# Patient Record
Sex: MALE | Race: WHITE | Employment: OTHER | URBAN - METROPOLITAN AREA
[De-identification: names, ages, dates, MRNs, and addresses within clinical notes are randomized per-mention and may not be internally consistent; named-entity substitution may affect disease eponyms.]

---

## 2017-01-01 ENCOUNTER — HOSPITAL ENCOUNTER (OUTPATIENT)
Dept: CARDIOLOGY | Age: 82
Discharge: HOME | End: 2017-12-19
Payer: MEDICARE

## 2017-01-01 ENCOUNTER — HOSPITAL ENCOUNTER (EMERGENCY)
Age: 82
Discharge: HOME | End: 2017-12-06
Payer: MEDICARE

## 2017-01-01 ENCOUNTER — APPOINTMENT (OUTPATIENT)
Dept: CT IMAGING | Age: 82
End: 2017-01-01
Payer: MEDICARE

## 2017-01-01 VITALS
TEMPERATURE: 97.8 F | SYSTOLIC BLOOD PRESSURE: 103 MMHG | DIASTOLIC BLOOD PRESSURE: 89 MMHG | HEART RATE: 89 BPM | WEIGHT: 230 LBS | BODY MASS INDEX: 31.15 KG/M2 | HEIGHT: 72 IN | RESPIRATION RATE: 18 BRPM | OXYGEN SATURATION: 99 %

## 2017-01-01 DIAGNOSIS — J01.91 ACUTE RECURRENT SINUSITIS, UNSPECIFIED LOCATION: Primary | ICD-10-CM

## 2017-01-01 PROCEDURE — 99283 EMERGENCY DEPT VISIT LOW MDM: CPT

## 2017-01-01 PROCEDURE — 93290 INTERROG DEV EVAL ICPMS IP: CPT

## 2017-01-01 PROCEDURE — 6370000000 HC RX 637 (ALT 250 FOR IP): Performed by: NURSE PRACTITIONER

## 2017-01-01 PROCEDURE — 70486 CT MAXILLOFACIAL W/O DYE: CPT

## 2017-01-01 PROCEDURE — 93283 PRGRMG EVAL IMPLANTABLE DFB: CPT

## 2017-01-01 RX ORDER — AMOXICILLIN AND CLAVULANATE POTASSIUM 875; 125 MG/1; MG/1
1 TABLET, FILM COATED ORAL 2 TIMES DAILY
Qty: 20 TABLET | Refills: 0 | Status: SHIPPED | OUTPATIENT
Start: 2017-01-01 | End: 2017-01-01

## 2017-01-01 RX ORDER — LORATADINE 10 MG/1
10 TABLET ORAL DAILY
Qty: 10 TABLET | Refills: 0 | Status: ON HOLD | OUTPATIENT
Start: 2017-01-01 | End: 2018-01-01 | Stop reason: ALTCHOICE

## 2017-01-01 RX ORDER — AMOXICILLIN AND CLAVULANATE POTASSIUM 875; 125 MG/1; MG/1
1 TABLET, FILM COATED ORAL ONCE
Status: COMPLETED | OUTPATIENT
Start: 2017-01-01 | End: 2017-01-01

## 2017-01-01 RX ORDER — BENZONATATE 100 MG/1
100 CAPSULE ORAL 3 TIMES DAILY PRN
Qty: 20 CAPSULE | Refills: 0 | Status: SHIPPED | OUTPATIENT
Start: 2017-01-01 | End: 2017-01-01

## 2017-01-01 RX ADMIN — AMOXICILLIN AND CLAVULANATE POTASSIUM 1 TABLET: 875; 125 TABLET, FILM COATED ORAL at 09:00

## 2017-01-01 ASSESSMENT — ENCOUNTER SYMPTOMS
ABDOMINAL PAIN: 0
VOMITING: 0
COUGH: 0
DIARRHEA: 0
VOICE CHANGE: 0
NAUSEA: 0
RHINORRHEA: 1
SHORTNESS OF BREATH: 0
SINUS PRESSURE: 1
TROUBLE SWALLOWING: 0

## 2017-01-16 ENCOUNTER — HOSPITAL ENCOUNTER (OUTPATIENT)
Dept: GENERAL RADIOLOGY | Age: 82
Discharge: HOME | End: 2017-01-16
Payer: MEDICARE

## 2017-01-16 DIAGNOSIS — R04.2 BLOOD IN SPUTUM: ICD-10-CM

## 2017-01-16 PROCEDURE — 71020 XR CHEST STANDARD TWO VW: CPT

## 2017-01-21 ENCOUNTER — HOSPITAL ENCOUNTER (EMERGENCY)
Age: 82
Discharge: HOME | End: 2017-01-21
Attending: STUDENT IN AN ORGANIZED HEALTH CARE EDUCATION/TRAINING PROGRAM
Payer: MEDICARE

## 2017-01-21 ENCOUNTER — APPOINTMENT (OUTPATIENT)
Dept: CT IMAGING | Age: 82
End: 2017-01-21
Payer: MEDICARE

## 2017-01-21 ENCOUNTER — APPOINTMENT (OUTPATIENT)
Dept: GENERAL RADIOLOGY | Age: 82
End: 2017-01-21
Payer: MEDICARE

## 2017-01-21 VITALS
HEART RATE: 79 BPM | HEIGHT: 73 IN | RESPIRATION RATE: 18 BRPM | OXYGEN SATURATION: 100 % | TEMPERATURE: 97.5 F | BODY MASS INDEX: 31.14 KG/M2 | DIASTOLIC BLOOD PRESSURE: 83 MMHG | WEIGHT: 235 LBS | SYSTOLIC BLOOD PRESSURE: 101 MMHG

## 2017-01-21 DIAGNOSIS — R79.1 SUPRATHERAPEUTIC INR: ICD-10-CM

## 2017-01-21 DIAGNOSIS — S16.1XXA STRAIN OF NECK, INITIAL ENCOUNTER: Primary | ICD-10-CM

## 2017-01-21 LAB
APTT: 41.6 SEC (ref 21.6–35.4)
BASOPHILS ABSOLUTE: 0 K/UL (ref 0–0.2)
BASOPHILS RELATIVE PERCENT: 0.7 %
CK MB: 3.5 NG/ML (ref 0–6.7)
CREATINE KINASE-MB INDEX: 3.1 % (ref 0–3.5)
EOSINOPHILS ABSOLUTE: 0 K/UL (ref 0–0.7)
EOSINOPHILS RELATIVE PERCENT: 0.9 %
GFR AFRICAN AMERICAN: >60
GFR NON-AFRICAN AMERICAN: 53
HCT VFR BLD CALC: 43 % (ref 42–52)
HEMOGLOBIN: 14 G/DL (ref 14–18)
INR BLD: 4.5
LYMPHOCYTES ABSOLUTE: 0.9 K/UL (ref 1–4.8)
LYMPHOCYTES RELATIVE PERCENT: 16.8 %
MCH RBC QN AUTO: 30.6 PG (ref 27–31.3)
MCHC RBC AUTO-ENTMCNC: 32.7 % (ref 33–37)
MCV RBC AUTO: 93.5 FL (ref 80–100)
MONOCYTES ABSOLUTE: 0.4 K/UL (ref 0.2–0.8)
MONOCYTES RELATIVE PERCENT: 8.4 %
NEUTROPHILS ABSOLUTE: 3.8 K/UL (ref 1.4–6.5)
NEUTROPHILS RELATIVE PERCENT: 73.2 %
PDW BLD-RTO: 14.3 % (ref 11.5–14.5)
PERFORMED ON: ABNORMAL
PLATELET # BLD: 113 K/UL (ref 130–400)
POC CREATININE WHOLE BLOOD: 1.3
POC CREATININE: 1.3 MG/DL (ref 0.8–1.3)
POC SAMPLE TYPE: ABNORMAL
PROTHROMBIN TIME: 50.6 SEC (ref 8.1–13.7)
RBC # BLD: 4.6 M/UL (ref 4.7–6.1)
TOTAL CK: 112 U/L (ref 0–190)
TROPONIN: <0.01 NG/ML (ref 0–0.01)
WBC # BLD: 5.2 K/UL (ref 4.8–10.8)

## 2017-01-21 PROCEDURE — 99284 EMERGENCY DEPT VISIT MOD MDM: CPT

## 2017-01-21 PROCEDURE — 36415 COLL VENOUS BLD VENIPUNCTURE: CPT

## 2017-01-21 PROCEDURE — 6360000004 HC RX CONTRAST MEDICATION: Performed by: RADIOLOGY

## 2017-01-21 PROCEDURE — 85730 THROMBOPLASTIN TIME PARTIAL: CPT

## 2017-01-21 PROCEDURE — 85025 COMPLETE CBC W/AUTO DIFF WBC: CPT

## 2017-01-21 PROCEDURE — 93005 ELECTROCARDIOGRAM TRACING: CPT

## 2017-01-21 PROCEDURE — 82550 ASSAY OF CK (CPK): CPT

## 2017-01-21 PROCEDURE — 71260 CT THORAX DX C+: CPT

## 2017-01-21 PROCEDURE — 82553 CREATINE MB FRACTION: CPT

## 2017-01-21 PROCEDURE — 85610 PROTHROMBIN TIME: CPT

## 2017-01-21 PROCEDURE — 84484 ASSAY OF TROPONIN QUANT: CPT

## 2017-01-21 PROCEDURE — 71020 XR CHEST STANDARD TWO VW: CPT

## 2017-01-21 PROCEDURE — 70491 CT SOFT TISSUE NECK W/DYE: CPT

## 2017-01-21 RX ORDER — HYDROCODONE BITARTRATE AND ACETAMINOPHEN 5; 325 MG/1; MG/1
1 TABLET ORAL EVERY 6 HOURS PRN
Qty: 16 TABLET | Refills: 0 | Status: SHIPPED | OUTPATIENT
Start: 2017-01-21 | End: 2017-11-06 | Stop reason: SDUPTHER

## 2017-01-21 RX ADMIN — IOPAMIDOL 100 ML: 612 INJECTION, SOLUTION INTRAVENOUS at 10:28

## 2017-01-21 ASSESSMENT — ENCOUNTER SYMPTOMS
DIARRHEA: 0
SHORTNESS OF BREATH: 0
VOMITING: 0
SINUS PRESSURE: 0
CHEST TIGHTNESS: 0
TROUBLE SWALLOWING: 0
ABDOMINAL PAIN: 0
BACK PAIN: 0
COUGH: 0

## 2017-01-21 ASSESSMENT — PAIN SCALES - GENERAL
PAINLEVEL_OUTOF10: 9
PAINLEVEL_OUTOF10: 9
PAINLEVEL_OUTOF10: 0

## 2017-01-21 ASSESSMENT — PAIN DESCRIPTION - LOCATION
LOCATION: NECK
LOCATION: NECK

## 2017-01-21 ASSESSMENT — PAIN DESCRIPTION - PAIN TYPE: TYPE: ACUTE PAIN

## 2017-01-21 ASSESSMENT — PAIN DESCRIPTION - ORIENTATION: ORIENTATION: LEFT

## 2017-01-31 ENCOUNTER — HOSPITAL ENCOUNTER (EMERGENCY)
Age: 82
Discharge: HOME | End: 2017-01-31
Attending: EMERGENCY MEDICINE
Payer: MEDICARE

## 2017-01-31 ENCOUNTER — APPOINTMENT (OUTPATIENT)
Dept: GENERAL RADIOLOGY | Age: 82
End: 2017-01-31
Payer: MEDICARE

## 2017-01-31 VITALS
HEART RATE: 75 BPM | RESPIRATION RATE: 16 BRPM | DIASTOLIC BLOOD PRESSURE: 68 MMHG | OXYGEN SATURATION: 97 % | TEMPERATURE: 98 F | WEIGHT: 235 LBS | BODY MASS INDEX: 31 KG/M2 | SYSTOLIC BLOOD PRESSURE: 101 MMHG

## 2017-01-31 DIAGNOSIS — R06.02 SHORTNESS OF BREATH: Primary | ICD-10-CM

## 2017-01-31 LAB
ALBUMIN SERPL-MCNC: 4.3 G/DL (ref 3.9–4.9)
ALP BLD-CCNC: 63 U/L (ref 35–104)
ALT SERPL-CCNC: <5 U/L (ref 0–41)
ANION GAP SERPL CALCULATED.3IONS-SCNC: 11 MEQ/L (ref 7–13)
AST SERPL-CCNC: 22 U/L (ref 0–40)
BASOPHILS ABSOLUTE: 0 K/UL (ref 0–0.2)
BASOPHILS RELATIVE PERCENT: 0.6 %
BILIRUB SERPL-MCNC: 1.2 MG/DL (ref 0–1.2)
BUN BLDV-MCNC: 24 MG/DL (ref 8–23)
CALCIUM SERPL-MCNC: 8.8 MG/DL (ref 8.6–10.2)
CHLORIDE BLD-SCNC: 99 MEQ/L (ref 98–107)
CO2: 29 MEQ/L (ref 22–29)
CREAT SERPL-MCNC: 1.13 MG/DL (ref 0.7–1.2)
EKG ATRIAL RATE: 73 BPM
EKG ATRIAL RATE: 75 BPM
EKG Q-T INTERVAL: 484 MS
EKG Q-T INTERVAL: 494 MS
EKG QRS DURATION: 188 MS
EKG QRS DURATION: 192 MS
EKG QTC CALCULATION (BAZETT): 547 MS
EKG QTC CALCULATION (BAZETT): 566 MS
EKG R AXIS: -84 DEGREES
EKG R AXIS: -88 DEGREES
EKG T AXIS: 90 DEGREES
EKG T AXIS: 92 DEGREES
EKG VENTRICULAR RATE: 77 BPM
EKG VENTRICULAR RATE: 79 BPM
EOSINOPHILS ABSOLUTE: 0 K/UL (ref 0–0.7)
EOSINOPHILS RELATIVE PERCENT: 0.9 %
GFR AFRICAN AMERICAN: >60
GFR NON-AFRICAN AMERICAN: >60
GLOBULIN: 2.3 G/DL (ref 2.3–3.5)
GLUCOSE BLD-MCNC: 108 MG/DL (ref 74–109)
HCT VFR BLD CALC: 44.1 % (ref 42–52)
HEMOGLOBIN: 14.1 G/DL (ref 14–18)
LYMPHOCYTES ABSOLUTE: 1 K/UL (ref 1–4.8)
LYMPHOCYTES RELATIVE PERCENT: 19.2 %
MCH RBC QN AUTO: 29.9 PG (ref 27–31.3)
MCHC RBC AUTO-ENTMCNC: 32 % (ref 33–37)
MCV RBC AUTO: 93.6 FL (ref 80–100)
MONOCYTES ABSOLUTE: 0.5 K/UL (ref 0.2–0.8)
MONOCYTES RELATIVE PERCENT: 10.1 %
NEUTROPHILS ABSOLUTE: 3.6 K/UL (ref 1.4–6.5)
NEUTROPHILS RELATIVE PERCENT: 69.2 %
PDW BLD-RTO: 14.1 % (ref 11.5–14.5)
PLATELET # BLD: 111 K/UL (ref 130–400)
POTASSIUM SERPL-SCNC: 4.2 MEQ/L (ref 3.5–5.1)
RBC # BLD: 4.72 M/UL (ref 4.7–6.1)
SODIUM BLD-SCNC: 139 MEQ/L (ref 132–144)
TOTAL PROTEIN: 6.6 G/DL (ref 6.4–8.1)
TROPONIN: <0.01 NG/ML (ref 0–0.01)
WBC # BLD: 5.2 K/UL (ref 4.8–10.8)

## 2017-01-31 PROCEDURE — 36415 COLL VENOUS BLD VENIPUNCTURE: CPT

## 2017-01-31 PROCEDURE — 99285 EMERGENCY DEPT VISIT HI MDM: CPT

## 2017-01-31 PROCEDURE — 71010 XR CHEST PORTABLE: CPT

## 2017-01-31 PROCEDURE — 84484 ASSAY OF TROPONIN QUANT: CPT

## 2017-01-31 PROCEDURE — 80053 COMPREHEN METABOLIC PANEL: CPT

## 2017-01-31 PROCEDURE — 96374 THER/PROPH/DIAG INJ IV PUSH: CPT

## 2017-01-31 PROCEDURE — 6360000002 HC RX W HCPCS: Performed by: EMERGENCY MEDICINE

## 2017-01-31 PROCEDURE — 93005 ELECTROCARDIOGRAM TRACING: CPT

## 2017-01-31 PROCEDURE — 85025 COMPLETE CBC W/AUTO DIFF WBC: CPT

## 2017-01-31 RX ORDER — LORAZEPAM 2 MG/ML
1 INJECTION INTRAMUSCULAR ONCE
Status: COMPLETED | OUTPATIENT
Start: 2017-01-31 | End: 2017-01-31

## 2017-01-31 RX ADMIN — LORAZEPAM 1 MG: 2 INJECTION INTRAMUSCULAR; INTRAVENOUS at 05:52

## 2017-01-31 ASSESSMENT — ENCOUNTER SYMPTOMS
CHEST TIGHTNESS: 0
SORE THROAT: 0
ABDOMINAL PAIN: 0
PHOTOPHOBIA: 0
SHORTNESS OF BREATH: 0
ABDOMINAL DISTENTION: 0
VOMITING: 0
EYE DISCHARGE: 0
WHEEZING: 0
COUGH: 0

## 2017-02-18 ENCOUNTER — HOSPITAL ENCOUNTER (EMERGENCY)
Age: 82
Discharge: HOME | End: 2017-02-18
Attending: FAMILY MEDICINE
Payer: MEDICARE

## 2017-02-18 VITALS
HEART RATE: 80 BPM | TEMPERATURE: 97.8 F | WEIGHT: 235 LBS | BODY MASS INDEX: 31.14 KG/M2 | DIASTOLIC BLOOD PRESSURE: 81 MMHG | HEIGHT: 73 IN | OXYGEN SATURATION: 95 % | SYSTOLIC BLOOD PRESSURE: 119 MMHG | RESPIRATION RATE: 24 BRPM

## 2017-02-18 DIAGNOSIS — J40 BRONCHITIS: Primary | ICD-10-CM

## 2017-02-18 DIAGNOSIS — F41.1 ANXIETY STATE: ICD-10-CM

## 2017-02-18 PROCEDURE — 99283 EMERGENCY DEPT VISIT LOW MDM: CPT

## 2017-02-18 ASSESSMENT — PAIN SCALES - GENERAL
PAINLEVEL_OUTOF10: 0
PAINLEVEL_OUTOF10: 8

## 2017-02-18 ASSESSMENT — PAIN DESCRIPTION - LOCATION: LOCATION: GENERALIZED

## 2017-03-03 ENCOUNTER — HOSPITAL ENCOUNTER (OUTPATIENT)
Dept: PULMONOLOGY | Age: 82
Discharge: HOME | End: 2017-03-03
Payer: MEDICARE

## 2017-03-03 PROCEDURE — 94726 PLETHYSMOGRAPHY LUNG VOLUMES: CPT

## 2017-03-03 PROCEDURE — 94010 BREATHING CAPACITY TEST: CPT

## 2017-03-03 PROCEDURE — 94729 DIFFUSING CAPACITY: CPT

## 2017-03-21 ENCOUNTER — HOSPITAL ENCOUNTER (OUTPATIENT)
Dept: CARDIOLOGY | Age: 82
Discharge: HOME | End: 2017-03-21
Payer: MEDICARE

## 2017-03-21 PROCEDURE — 93290 INTERROG DEV EVAL ICPMS IP: CPT

## 2017-03-21 PROCEDURE — 93283 PRGRMG EVAL IMPLANTABLE DFB: CPT

## 2017-06-20 ENCOUNTER — HOSPITAL ENCOUNTER (OUTPATIENT)
Dept: CARDIOLOGY | Age: 82
Discharge: HOME | End: 2017-06-20
Payer: MEDICARE

## 2017-06-20 PROCEDURE — 93290 INTERROG DEV EVAL ICPMS IP: CPT

## 2017-06-20 PROCEDURE — 93283 PRGRMG EVAL IMPLANTABLE DFB: CPT

## 2017-08-10 ENCOUNTER — APPOINTMENT (OUTPATIENT)
Dept: CT IMAGING | Age: 82
DRG: 813 | End: 2017-08-10
Payer: MEDICARE

## 2017-08-10 ENCOUNTER — HOSPITAL ENCOUNTER (INPATIENT)
Age: 82
LOS: 6 days | Discharge: HOME | DRG: 813 | End: 2017-08-16
Attending: INTERNAL MEDICINE | Admitting: INTERNAL MEDICINE
Payer: MEDICARE

## 2017-08-10 DIAGNOSIS — R04.2 HEMOPTYSIS: ICD-10-CM

## 2017-08-10 DIAGNOSIS — J18.9 PNEUMONIA OF RIGHT LOWER LOBE DUE TO INFECTIOUS ORGANISM: Primary | ICD-10-CM

## 2017-08-10 LAB
ALBUMIN SERPL-MCNC: 4.2 G/DL (ref 3.9–4.9)
ALP BLD-CCNC: 87 U/L (ref 35–104)
ALT SERPL-CCNC: <5 U/L (ref 0–41)
ANION GAP SERPL CALCULATED.3IONS-SCNC: 12 MEQ/L (ref 7–13)
APTT: 32 SEC (ref 21.6–35.4)
AST SERPL-CCNC: 25 U/L (ref 0–40)
BASOPHILS ABSOLUTE: 0 K/UL (ref 0–0.2)
BASOPHILS RELATIVE PERCENT: 0.6 %
BILIRUB SERPL-MCNC: 0.8 MG/DL (ref 0–1.2)
BILIRUBIN DIRECT: 0.2 MG/DL (ref 0–0.3)
BILIRUBIN, INDIRECT: 0.6 MG/DL (ref 0–0.6)
BUN BLDV-MCNC: 35 MG/DL (ref 8–23)
CALCIUM SERPL-MCNC: 8.8 MG/DL (ref 8.6–10.2)
CHLORIDE BLD-SCNC: 99 MEQ/L (ref 98–107)
CO2: 29 MEQ/L (ref 22–29)
CREAT SERPL-MCNC: 1.11 MG/DL (ref 0.7–1.2)
EOSINOPHILS ABSOLUTE: 0 K/UL (ref 0–0.7)
EOSINOPHILS RELATIVE PERCENT: 0.7 %
GFR AFRICAN AMERICAN: >60
GFR NON-AFRICAN AMERICAN: >60
GLOBULIN: 2.9 G/DL (ref 2.3–3.5)
GLUCOSE BLD-MCNC: 111 MG/DL (ref 74–109)
HCT VFR BLD CALC: 44.9 % (ref 42–52)
HEMOGLOBIN: 14.8 G/DL (ref 14–18)
INR BLD: 2.7
LACTIC ACID: 1.5 MMOL/L (ref 0.5–2.2)
LIPASE: 40 U/L (ref 13–60)
LYMPHOCYTES ABSOLUTE: 0.7 K/UL (ref 1–4.8)
LYMPHOCYTES RELATIVE PERCENT: 10.3 %
MCH RBC QN AUTO: 30.1 PG (ref 27–31.3)
MCHC RBC AUTO-ENTMCNC: 32.9 % (ref 33–37)
MCV RBC AUTO: 91.5 FL (ref 80–100)
MONOCYTES ABSOLUTE: 0.4 K/UL (ref 0.2–0.8)
MONOCYTES RELATIVE PERCENT: 5.5 %
NEUTROPHILS ABSOLUTE: 5.7 K/UL (ref 1.4–6.5)
NEUTROPHILS RELATIVE PERCENT: 82.9 %
PDW BLD-RTO: 14.6 % (ref 11.5–14.5)
PLATELET # BLD: 135 K/UL (ref 130–400)
POTASSIUM SERPL-SCNC: 4.3 MEQ/L (ref 3.5–5.1)
PROTHROMBIN TIME: 28.8 SEC (ref 8.1–13.7)
RBC # BLD: 4.9 M/UL (ref 4.7–6.1)
SODIUM BLD-SCNC: 140 MEQ/L (ref 132–144)
TOTAL PROTEIN: 7.1 G/DL (ref 6.4–8.1)
WBC # BLD: 6.9 K/UL (ref 4.8–10.8)

## 2017-08-10 PROCEDURE — 83605 ASSAY OF LACTIC ACID: CPT

## 2017-08-10 PROCEDURE — 85730 THROMBOPLASTIN TIME PARTIAL: CPT

## 2017-08-10 PROCEDURE — 6360000002 HC RX W HCPCS: Performed by: NURSE PRACTITIONER

## 2017-08-10 PROCEDURE — 83690 ASSAY OF LIPASE: CPT

## 2017-08-10 PROCEDURE — 85610 PROTHROMBIN TIME: CPT

## 2017-08-10 PROCEDURE — 85025 COMPLETE CBC W/AUTO DIFF WBC: CPT

## 2017-08-10 PROCEDURE — 71275 CT ANGIOGRAPHY CHEST: CPT

## 2017-08-10 PROCEDURE — 70450 CT HEAD/BRAIN W/O DYE: CPT

## 2017-08-10 PROCEDURE — 36415 COLL VENOUS BLD VENIPUNCTURE: CPT

## 2017-08-10 PROCEDURE — 96365 THER/PROPH/DIAG IV INF INIT: CPT

## 2017-08-10 PROCEDURE — 1210000000 HC MED SURG R&B

## 2017-08-10 PROCEDURE — 2580000003 HC RX 258: Performed by: NURSE PRACTITIONER

## 2017-08-10 PROCEDURE — 82248 BILIRUBIN DIRECT: CPT

## 2017-08-10 PROCEDURE — 6360000004 HC RX CONTRAST MEDICATION: Performed by: RADIOLOGY

## 2017-08-10 PROCEDURE — 80053 COMPREHEN METABOLIC PANEL: CPT

## 2017-08-10 PROCEDURE — 87040 BLOOD CULTURE FOR BACTERIA: CPT

## 2017-08-10 PROCEDURE — 6360000002 HC RX W HCPCS: Performed by: PHYSICIAN ASSISTANT

## 2017-08-10 PROCEDURE — 99284 EMERGENCY DEPT VISIT MOD MDM: CPT

## 2017-08-10 PROCEDURE — 96375 TX/PRO/DX INJ NEW DRUG ADDON: CPT

## 2017-08-10 RX ORDER — LEVOFLOXACIN 5 MG/ML
750 INJECTION, SOLUTION INTRAVENOUS ONCE
Status: COMPLETED | OUTPATIENT
Start: 2017-08-10 | End: 2017-08-11

## 2017-08-10 RX ORDER — SODIUM CHLORIDE 0.9 % (FLUSH) 0.9 %
3 SYRINGE (ML) INJECTION EVERY 8 HOURS
Status: DISCONTINUED | OUTPATIENT
Start: 2017-08-10 | End: 2017-08-11

## 2017-08-10 RX ORDER — LORAZEPAM 2 MG/ML
1 INJECTION INTRAMUSCULAR ONCE
Status: COMPLETED | OUTPATIENT
Start: 2017-08-10 | End: 2017-08-10

## 2017-08-10 RX ADMIN — IOPAMIDOL 100 ML: 755 INJECTION, SOLUTION INTRAVENOUS at 21:33

## 2017-08-10 RX ADMIN — LEVOFLOXACIN 750 MG: 5 INJECTION, SOLUTION INTRAVENOUS at 23:04

## 2017-08-10 RX ADMIN — SODIUM CHLORIDE, PRESERVATIVE FREE 3 ML: 5 INJECTION INTRAVENOUS at 21:05

## 2017-08-10 RX ADMIN — LORAZEPAM 1 MG: 2 INJECTION INTRAMUSCULAR; INTRAVENOUS at 21:05

## 2017-08-10 ASSESSMENT — PAIN DESCRIPTION - LOCATION: LOCATION: ABDOMEN

## 2017-08-10 ASSESSMENT — ENCOUNTER SYMPTOMS
SHORTNESS OF BREATH: 0
RHINORRHEA: 0
COUGH: 1
WHEEZING: 0
SORE THROAT: 0
EYE DISCHARGE: 0

## 2017-08-10 ASSESSMENT — PAIN SCALES - GENERAL: PAINLEVEL_OUTOF10: 6

## 2017-08-10 ASSESSMENT — PAIN DESCRIPTION - PAIN TYPE: TYPE: ACUTE PAIN

## 2017-08-11 PROBLEM — D69.6 THROMBOCYTOPENIA (HCC): Status: ACTIVE | Noted: 2017-08-11

## 2017-08-11 LAB
ALBUMIN SERPL-MCNC: 3.3 G/DL (ref 3.9–4.9)
ALP BLD-CCNC: 53 U/L (ref 35–104)
ALT SERPL-CCNC: 10 U/L (ref 0–41)
ANION GAP SERPL CALCULATED.3IONS-SCNC: 11 MEQ/L (ref 7–13)
AST SERPL-CCNC: 19 U/L (ref 0–40)
BILIRUB SERPL-MCNC: 1.3 MG/DL (ref 0–1.2)
BUN BLDV-MCNC: 31 MG/DL (ref 8–23)
CALCIUM SERPL-MCNC: 8.3 MG/DL (ref 8.6–10.2)
CHLORIDE BLD-SCNC: 98 MEQ/L (ref 98–107)
CO2: 27 MEQ/L (ref 22–29)
CREAT SERPL-MCNC: 1.02 MG/DL (ref 0.7–1.2)
GFR AFRICAN AMERICAN: >60
GFR AFRICAN AMERICAN: >60
GFR NON-AFRICAN AMERICAN: 52
GFR NON-AFRICAN AMERICAN: >60
GLOBULIN: 2.4 G/DL (ref 2.3–3.5)
GLUCOSE BLD-MCNC: 110 MG/DL (ref 74–109)
HCT VFR BLD CALC: 38.2 % (ref 42–52)
HEMOGLOBIN: 12.6 G/DL (ref 14–18)
INR BLD: 3
MCH RBC QN AUTO: 30.1 PG (ref 27–31.3)
MCHC RBC AUTO-ENTMCNC: 32.9 % (ref 33–37)
MCV RBC AUTO: 91.5 FL (ref 80–100)
PDW BLD-RTO: 14.2 % (ref 11.5–14.5)
PERFORMED ON: ABNORMAL
PLATELET # BLD: 94 K/UL (ref 130–400)
PLATELET SLIDE REVIEW: ABNORMAL
POC CREATININE: 1.3 MG/DL (ref 0.8–1.3)
POC SAMPLE TYPE: ABNORMAL
POTASSIUM SERPL-SCNC: 4.6 MEQ/L (ref 3.5–5.1)
PRO-BNP: 3822 PG/ML
PROTHROMBIN TIME: 32.1 SEC (ref 8.1–13.7)
RBC # BLD: 4.17 M/UL (ref 4.7–6.1)
SODIUM BLD-SCNC: 136 MEQ/L (ref 132–144)
TOTAL PROTEIN: 5.7 G/DL (ref 6.4–8.1)
WBC # BLD: 10.6 K/UL (ref 4.8–10.8)

## 2017-08-11 PROCEDURE — 6370000000 HC RX 637 (ALT 250 FOR IP): Performed by: INTERNAL MEDICINE

## 2017-08-11 PROCEDURE — 97165 OT EVAL LOW COMPLEX 30 MIN: CPT

## 2017-08-11 PROCEDURE — G8987 SELF CARE CURRENT STATUS: HCPCS

## 2017-08-11 PROCEDURE — 6360000002 HC RX W HCPCS: Performed by: INTERNAL MEDICINE

## 2017-08-11 PROCEDURE — 36415 COLL VENOUS BLD VENIPUNCTURE: CPT

## 2017-08-11 PROCEDURE — G8988 SELF CARE GOAL STATUS: HCPCS

## 2017-08-11 PROCEDURE — 2580000003 HC RX 258: Performed by: INTERNAL MEDICINE

## 2017-08-11 PROCEDURE — 80053 COMPREHEN METABOLIC PANEL: CPT

## 2017-08-11 PROCEDURE — 1210000000 HC MED SURG R&B

## 2017-08-11 PROCEDURE — 99222 1ST HOSP IP/OBS MODERATE 55: CPT | Performed by: INTERNAL MEDICINE

## 2017-08-11 PROCEDURE — 94664 DEMO&/EVAL PT USE INHALER: CPT

## 2017-08-11 PROCEDURE — 85610 PROTHROMBIN TIME: CPT

## 2017-08-11 PROCEDURE — 85027 COMPLETE CBC AUTOMATED: CPT

## 2017-08-11 PROCEDURE — 83880 ASSAY OF NATRIURETIC PEPTIDE: CPT

## 2017-08-11 RX ORDER — HYDROCODONE BITARTRATE AND ACETAMINOPHEN 5; 325 MG/1; MG/1
1 TABLET ORAL EVERY 6 HOURS PRN
Status: DISCONTINUED | OUTPATIENT
Start: 2017-08-11 | End: 2017-08-16 | Stop reason: HOSPADM

## 2017-08-11 RX ORDER — SODIUM CHLORIDE 0.9 % (FLUSH) 0.9 %
10 SYRINGE (ML) INJECTION EVERY 12 HOURS SCHEDULED
Status: DISCONTINUED | OUTPATIENT
Start: 2017-08-11 | End: 2017-08-16 | Stop reason: HOSPADM

## 2017-08-11 RX ORDER — ALBUTEROL SULFATE 2.5 MG/3ML
2.5 SOLUTION RESPIRATORY (INHALATION)
Status: DISCONTINUED | OUTPATIENT
Start: 2017-08-11 | End: 2017-08-11

## 2017-08-11 RX ORDER — SODIUM CHLORIDE 0.9 % (FLUSH) 0.9 %
10 SYRINGE (ML) INJECTION PRN
Status: DISCONTINUED | OUTPATIENT
Start: 2017-08-11 | End: 2017-08-16 | Stop reason: HOSPADM

## 2017-08-11 RX ORDER — ALPRAZOLAM 0.25 MG/1
0.25 TABLET ORAL 3 TIMES DAILY PRN
Status: DISCONTINUED | OUTPATIENT
Start: 2017-08-11 | End: 2017-08-16 | Stop reason: HOSPADM

## 2017-08-11 RX ORDER — ACETAMINOPHEN 325 MG/1
650 TABLET ORAL EVERY 4 HOURS PRN
Status: DISCONTINUED | OUTPATIENT
Start: 2017-08-11 | End: 2017-08-16 | Stop reason: HOSPADM

## 2017-08-11 RX ORDER — POTASSIUM CHLORIDE 750 MG/1
10 TABLET, FILM COATED, EXTENDED RELEASE ORAL DAILY
Status: DISCONTINUED | OUTPATIENT
Start: 2017-08-11 | End: 2017-08-16 | Stop reason: HOSPADM

## 2017-08-11 RX ORDER — AMIODARONE HYDROCHLORIDE 200 MG/1
100 TABLET ORAL DAILY
Status: DISCONTINUED | OUTPATIENT
Start: 2017-08-11 | End: 2017-08-12

## 2017-08-11 RX ORDER — FAMOTIDINE 20 MG/1
20 TABLET, FILM COATED ORAL 2 TIMES DAILY
Status: DISCONTINUED | OUTPATIENT
Start: 2017-08-11 | End: 2017-08-16 | Stop reason: HOSPADM

## 2017-08-11 RX ORDER — SPIRONOLACTONE 25 MG/1
12.5 TABLET ORAL DAILY
Status: DISCONTINUED | OUTPATIENT
Start: 2017-08-11 | End: 2017-08-16 | Stop reason: HOSPADM

## 2017-08-11 RX ORDER — GUAIFENESIN/DEXTROMETHORPHAN 100-10MG/5
5 SYRUP ORAL EVERY 4 HOURS PRN
Status: DISCONTINUED | OUTPATIENT
Start: 2017-08-11 | End: 2017-08-16 | Stop reason: HOSPADM

## 2017-08-11 RX ORDER — MIDODRINE HYDROCHLORIDE 2.5 MG/1
2.5 TABLET ORAL
Status: DISCONTINUED | OUTPATIENT
Start: 2017-08-11 | End: 2017-08-13

## 2017-08-11 RX ORDER — SIMVASTATIN 40 MG
40 TABLET ORAL NIGHTLY
Status: DISCONTINUED | OUTPATIENT
Start: 2017-08-11 | End: 2017-08-14

## 2017-08-11 RX ORDER — TRIFLUOPERAZINE HYDROCHLORIDE 2 MG/1
2 TABLET, FILM COATED ORAL DAILY
Status: DISCONTINUED | OUTPATIENT
Start: 2017-08-11 | End: 2017-08-16 | Stop reason: HOSPADM

## 2017-08-11 RX ORDER — LISINOPRIL 2.5 MG/1
2.5 TABLET ORAL DAILY
Status: DISCONTINUED | OUTPATIENT
Start: 2017-08-11 | End: 2017-08-13

## 2017-08-11 RX ORDER — DOCUSATE SODIUM 100 MG/1
100 CAPSULE, LIQUID FILLED ORAL 2 TIMES DAILY
Status: DISCONTINUED | OUTPATIENT
Start: 2017-08-11 | End: 2017-08-16 | Stop reason: HOSPADM

## 2017-08-11 RX ORDER — CARVEDILOL 12.5 MG/1
12.5 TABLET ORAL DAILY
Status: DISCONTINUED | OUTPATIENT
Start: 2017-08-11 | End: 2017-08-13

## 2017-08-11 RX ORDER — ONDANSETRON 2 MG/ML
4 INJECTION INTRAMUSCULAR; INTRAVENOUS EVERY 6 HOURS PRN
Status: DISCONTINUED | OUTPATIENT
Start: 2017-08-11 | End: 2017-08-16 | Stop reason: HOSPADM

## 2017-08-11 RX ORDER — FUROSEMIDE 40 MG/1
40 TABLET ORAL DAILY
Status: DISCONTINUED | OUTPATIENT
Start: 2017-08-11 | End: 2017-08-13

## 2017-08-11 RX ORDER — ACETAMINOPHEN 80 MG
TABLET,CHEWABLE ORAL ONCE
Status: DISCONTINUED | OUTPATIENT
Start: 2017-08-11 | End: 2017-08-16 | Stop reason: HOSPADM

## 2017-08-11 RX ORDER — ALBUTEROL SULFATE 2.5 MG/3ML
2.5 SOLUTION RESPIRATORY (INHALATION) EVERY 4 HOURS PRN
Status: DISCONTINUED | OUTPATIENT
Start: 2017-08-11 | End: 2017-08-16 | Stop reason: HOSPADM

## 2017-08-11 RX ORDER — DIGOXIN 125 MCG
125 TABLET ORAL EVERY OTHER DAY
Status: DISCONTINUED | OUTPATIENT
Start: 2017-08-11 | End: 2017-08-16 | Stop reason: HOSPADM

## 2017-08-11 RX ADMIN — SODIUM CHLORIDE, PRESERVATIVE FREE 10 ML: 5 INJECTION INTRAVENOUS at 09:33

## 2017-08-11 RX ADMIN — CARBIDOPA AND LEVODOPA 1 TABLET: 25; 100 TABLET ORAL at 16:20

## 2017-08-11 RX ADMIN — FAMOTIDINE 20 MG: 20 TABLET ORAL at 20:25

## 2017-08-11 RX ADMIN — Medication 400 MG: at 09:23

## 2017-08-11 RX ADMIN — CARVEDILOL 12.5 MG: 12.5 TABLET, FILM COATED ORAL at 09:24

## 2017-08-11 RX ADMIN — CARBIDOPA AND LEVODOPA 1 TABLET: 25; 100 TABLET ORAL at 20:25

## 2017-08-11 RX ADMIN — FAMOTIDINE 20 MG: 20 TABLET ORAL at 09:23

## 2017-08-11 RX ADMIN — AMIODARONE HYDROCHLORIDE 100 MG: 200 TABLET ORAL at 09:23

## 2017-08-11 RX ADMIN — SODIUM CHLORIDE, PRESERVATIVE FREE 10 ML: 5 INJECTION INTRAVENOUS at 20:26

## 2017-08-11 RX ADMIN — LISINOPRIL 2.5 MG: 2.5 TABLET ORAL at 09:23

## 2017-08-11 RX ADMIN — MIDODRINE HYDROCHLORIDE 2.5 MG: 2.5 TABLET ORAL at 09:24

## 2017-08-11 RX ADMIN — PIPERACILLIN SODIUM,TAZOBACTAM SODIUM 3.38 G: 3; .375 INJECTION, POWDER, FOR SOLUTION INTRAVENOUS at 09:23

## 2017-08-11 RX ADMIN — SPIRONOLACTONE 12.5 MG: 25 TABLET, FILM COATED ORAL at 09:24

## 2017-08-11 RX ADMIN — FUROSEMIDE 40 MG: 40 TABLET ORAL at 09:24

## 2017-08-11 RX ADMIN — PIPERACILLIN SODIUM,TAZOBACTAM SODIUM 3.38 G: 3; .375 INJECTION, POWDER, FOR SOLUTION INTRAVENOUS at 01:28

## 2017-08-11 RX ADMIN — DIGOXIN 125 MCG: 0.12 TABLET ORAL at 09:23

## 2017-08-11 RX ADMIN — DOCUSATE SODIUM 100 MG: 100 CAPSULE, LIQUID FILLED ORAL at 09:23

## 2017-08-11 RX ADMIN — DOCUSATE SODIUM 100 MG: 100 CAPSULE, LIQUID FILLED ORAL at 20:25

## 2017-08-11 RX ADMIN — FAMOTIDINE 20 MG: 20 TABLET ORAL at 01:27

## 2017-08-11 RX ADMIN — CARBIDOPA AND LEVODOPA 1 TABLET: 25; 100 TABLET ORAL at 09:23

## 2017-08-11 RX ADMIN — MIDODRINE HYDROCHLORIDE 2.5 MG: 2.5 TABLET ORAL at 16:20

## 2017-08-11 RX ADMIN — DOCUSATE SODIUM 100 MG: 100 CAPSULE, LIQUID FILLED ORAL at 01:27

## 2017-08-11 RX ADMIN — SIMVASTATIN 40 MG: 40 TABLET, FILM COATED ORAL at 20:25

## 2017-08-11 RX ADMIN — POTASSIUM CHLORIDE 10 MEQ: 750 TABLET, FILM COATED, EXTENDED RELEASE ORAL at 09:24

## 2017-08-11 ASSESSMENT — ENCOUNTER SYMPTOMS
VOMITING: 0
DOUBLE VISION: 0
HEMOPTYSIS: 1
SHORTNESS OF BREATH: 1
NAUSEA: 0
ORTHOPNEA: 0
ABDOMINAL PAIN: 0
COUGH: 1
BLOOD IN STOOL: 0
HEARTBURN: 0
DIARRHEA: 0

## 2017-08-12 ENCOUNTER — APPOINTMENT (OUTPATIENT)
Dept: GENERAL RADIOLOGY | Age: 82
DRG: 813 | End: 2017-08-12
Payer: MEDICARE

## 2017-08-12 LAB
HCT VFR BLD CALC: 37 % (ref 42–52)
HEMOGLOBIN: 12.2 G/DL (ref 14–18)
INR BLD: 2.8
LV EF: 30 %
LVEF MODALITY: NORMAL
MCH RBC QN AUTO: 30 PG (ref 27–31.3)
MCHC RBC AUTO-ENTMCNC: 33 % (ref 33–37)
MCV RBC AUTO: 91 FL (ref 80–100)
PDW BLD-RTO: 14.4 % (ref 11.5–14.5)
PLATELET # BLD: 84 K/UL (ref 130–400)
PRO-BNP: 1978 PG/ML
PROTHROMBIN TIME: 29.8 SEC (ref 8.1–13.7)
RBC # BLD: 4.07 M/UL (ref 4.7–6.1)
WBC # BLD: 5.1 K/UL (ref 4.8–10.8)

## 2017-08-12 PROCEDURE — G8979 MOBILITY GOAL STATUS: HCPCS

## 2017-08-12 PROCEDURE — 93306 TTE W/DOPPLER COMPLETE: CPT

## 2017-08-12 PROCEDURE — 85610 PROTHROMBIN TIME: CPT

## 2017-08-12 PROCEDURE — 36415 COLL VENOUS BLD VENIPUNCTURE: CPT

## 2017-08-12 PROCEDURE — 6360000002 HC RX W HCPCS: Performed by: INTERNAL MEDICINE

## 2017-08-12 PROCEDURE — G0009 ADMIN PNEUMOCOCCAL VACCINE: HCPCS | Performed by: INTERNAL MEDICINE

## 2017-08-12 PROCEDURE — G8978 MOBILITY CURRENT STATUS: HCPCS

## 2017-08-12 PROCEDURE — 6370000000 HC RX 637 (ALT 250 FOR IP): Performed by: INTERNAL MEDICINE

## 2017-08-12 PROCEDURE — 71020 XR CHEST STANDARD TWO VW: CPT

## 2017-08-12 PROCEDURE — 83880 ASSAY OF NATRIURETIC PEPTIDE: CPT

## 2017-08-12 PROCEDURE — 90670 PCV13 VACCINE IM: CPT | Performed by: INTERNAL MEDICINE

## 2017-08-12 PROCEDURE — 97161 PT EVAL LOW COMPLEX 20 MIN: CPT

## 2017-08-12 PROCEDURE — G8980 MOBILITY D/C STATUS: HCPCS

## 2017-08-12 PROCEDURE — 85027 COMPLETE CBC AUTOMATED: CPT

## 2017-08-12 PROCEDURE — 1210000000 HC MED SURG R&B

## 2017-08-12 PROCEDURE — 2580000003 HC RX 258: Performed by: INTERNAL MEDICINE

## 2017-08-12 RX ADMIN — FUROSEMIDE 40 MG: 40 TABLET ORAL at 10:05

## 2017-08-12 RX ADMIN — ALPRAZOLAM 0.25 MG: 0.25 TABLET ORAL at 11:48

## 2017-08-12 RX ADMIN — AMIODARONE HYDROCHLORIDE 100 MG: 200 TABLET ORAL at 10:06

## 2017-08-12 RX ADMIN — DOCUSATE SODIUM 100 MG: 100 CAPSULE, LIQUID FILLED ORAL at 10:04

## 2017-08-12 RX ADMIN — SIMVASTATIN 40 MG: 40 TABLET, FILM COATED ORAL at 23:40

## 2017-08-12 RX ADMIN — FAMOTIDINE 20 MG: 20 TABLET ORAL at 10:06

## 2017-08-12 RX ADMIN — SODIUM CHLORIDE, PRESERVATIVE FREE 10 ML: 5 INJECTION INTRAVENOUS at 10:08

## 2017-08-12 RX ADMIN — CARBIDOPA AND LEVODOPA 1 TABLET: 25; 100 TABLET ORAL at 10:04

## 2017-08-12 RX ADMIN — FAMOTIDINE 20 MG: 20 TABLET ORAL at 23:40

## 2017-08-12 RX ADMIN — MIDODRINE HYDROCHLORIDE 2.5 MG: 2.5 TABLET ORAL at 17:56

## 2017-08-12 RX ADMIN — CARVEDILOL 12.5 MG: 12.5 TABLET, FILM COATED ORAL at 10:06

## 2017-08-12 RX ADMIN — SODIUM CHLORIDE, PRESERVATIVE FREE 10 ML: 5 INJECTION INTRAVENOUS at 23:41

## 2017-08-12 RX ADMIN — CARBIDOPA AND LEVODOPA 1 TABLET: 25; 100 TABLET ORAL at 23:41

## 2017-08-12 RX ADMIN — DOCUSATE SODIUM 100 MG: 100 CAPSULE, LIQUID FILLED ORAL at 23:40

## 2017-08-12 RX ADMIN — SPIRONOLACTONE 12.5 MG: 25 TABLET, FILM COATED ORAL at 10:05

## 2017-08-12 RX ADMIN — POTASSIUM CHLORIDE 10 MEQ: 750 TABLET, FILM COATED, EXTENDED RELEASE ORAL at 10:05

## 2017-08-12 RX ADMIN — Medication 400 MG: at 10:05

## 2017-08-12 RX ADMIN — TRIFLUOPERAZINE HYDROCHLORIDE 2 MG: 2 TABLET, FILM COATED ORAL at 10:04

## 2017-08-12 RX ADMIN — ALPRAZOLAM 0.25 MG: 0.25 TABLET ORAL at 23:40

## 2017-08-12 RX ADMIN — PNEUMOCOCCAL 13-VALENT CONJUGATE VACCINE 0.5 ML: 2.2; 2.2; 2.2; 2.2; 2.2; 4.4; 2.2; 2.2; 2.2; 2.2; 2.2; 2.2; 2.2 INJECTION, SUSPENSION INTRAMUSCULAR at 23:41

## 2017-08-12 RX ADMIN — CARBIDOPA AND LEVODOPA 1 TABLET: 25; 100 TABLET ORAL at 15:25

## 2017-08-12 RX ADMIN — MIDODRINE HYDROCHLORIDE 2.5 MG: 2.5 TABLET ORAL at 10:04

## 2017-08-12 RX ADMIN — LISINOPRIL 2.5 MG: 2.5 TABLET ORAL at 10:06

## 2017-08-13 LAB
INR BLD: 1.8
PROTHROMBIN TIME: 19.4 SEC (ref 8.1–13.7)

## 2017-08-13 PROCEDURE — 36415 COLL VENOUS BLD VENIPUNCTURE: CPT

## 2017-08-13 PROCEDURE — 2580000003 HC RX 258: Performed by: INTERNAL MEDICINE

## 2017-08-13 PROCEDURE — 85610 PROTHROMBIN TIME: CPT

## 2017-08-13 PROCEDURE — 1210000000 HC MED SURG R&B

## 2017-08-13 PROCEDURE — 6370000000 HC RX 637 (ALT 250 FOR IP): Performed by: INTERNAL MEDICINE

## 2017-08-13 RX ORDER — MIDODRINE HYDROCHLORIDE 5 MG/1
5 TABLET ORAL
Status: DISCONTINUED | OUTPATIENT
Start: 2017-08-13 | End: 2017-08-15

## 2017-08-13 RX ORDER — FUROSEMIDE 20 MG/1
20 TABLET ORAL DAILY
Status: DISCONTINUED | OUTPATIENT
Start: 2017-08-14 | End: 2017-08-16 | Stop reason: HOSPADM

## 2017-08-13 RX ORDER — CARVEDILOL 6.25 MG/1
6.25 TABLET ORAL DAILY
Status: DISCONTINUED | OUTPATIENT
Start: 2017-08-14 | End: 2017-08-16 | Stop reason: HOSPADM

## 2017-08-13 RX ORDER — LISINOPRIL 2.5 MG/1
2.5 TABLET ORAL DAILY
Status: DISCONTINUED | OUTPATIENT
Start: 2017-08-14 | End: 2017-08-16 | Stop reason: HOSPADM

## 2017-08-13 RX ADMIN — CARBIDOPA AND LEVODOPA 1 TABLET: 25; 100 TABLET ORAL at 14:50

## 2017-08-13 RX ADMIN — DIGOXIN 125 MCG: 0.12 TABLET ORAL at 10:50

## 2017-08-13 RX ADMIN — MIDODRINE HYDROCHLORIDE 5 MG: 5 TABLET ORAL at 14:50

## 2017-08-13 RX ADMIN — DOCUSATE SODIUM 100 MG: 100 CAPSULE, LIQUID FILLED ORAL at 20:17

## 2017-08-13 RX ADMIN — POTASSIUM CHLORIDE 10 MEQ: 750 TABLET, FILM COATED, EXTENDED RELEASE ORAL at 10:49

## 2017-08-13 RX ADMIN — FAMOTIDINE 20 MG: 20 TABLET ORAL at 20:17

## 2017-08-13 RX ADMIN — SODIUM CHLORIDE, PRESERVATIVE FREE 10 ML: 5 INJECTION INTRAVENOUS at 20:17

## 2017-08-13 RX ADMIN — CARBIDOPA AND LEVODOPA 1 TABLET: 25; 100 TABLET ORAL at 10:49

## 2017-08-13 RX ADMIN — DOCUSATE SODIUM 100 MG: 100 CAPSULE, LIQUID FILLED ORAL at 10:50

## 2017-08-13 RX ADMIN — FAMOTIDINE 20 MG: 20 TABLET ORAL at 10:49

## 2017-08-13 RX ADMIN — SIMVASTATIN 40 MG: 40 TABLET, FILM COATED ORAL at 20:17

## 2017-08-13 RX ADMIN — CARBIDOPA AND LEVODOPA 1 TABLET: 25; 100 TABLET ORAL at 20:17

## 2017-08-13 RX ADMIN — TRIFLUOPERAZINE HYDROCHLORIDE 2 MG: 2 TABLET, FILM COATED ORAL at 10:50

## 2017-08-13 RX ADMIN — SODIUM CHLORIDE, PRESERVATIVE FREE 10 ML: 5 INJECTION INTRAVENOUS at 10:51

## 2017-08-13 RX ADMIN — MIDODRINE HYDROCHLORIDE 2.5 MG: 2.5 TABLET ORAL at 10:49

## 2017-08-13 RX ADMIN — Medication 400 MG: at 10:50

## 2017-08-13 ASSESSMENT — PAIN SCALES - GENERAL: PAINLEVEL_OUTOF10: 0

## 2017-08-14 LAB
INR BLD: 1.4
PROTHROMBIN TIME: 15.2 SEC (ref 8.1–13.7)

## 2017-08-14 PROCEDURE — 1210000000 HC MED SURG R&B

## 2017-08-14 PROCEDURE — 6370000000 HC RX 637 (ALT 250 FOR IP): Performed by: INTERNAL MEDICINE

## 2017-08-14 PROCEDURE — 36415 COLL VENOUS BLD VENIPUNCTURE: CPT

## 2017-08-14 PROCEDURE — 85610 PROTHROMBIN TIME: CPT

## 2017-08-14 PROCEDURE — 99232 SBSQ HOSP IP/OBS MODERATE 35: CPT | Performed by: INTERNAL MEDICINE

## 2017-08-14 PROCEDURE — 2580000003 HC RX 258: Performed by: INTERNAL MEDICINE

## 2017-08-14 RX ORDER — AMIODARONE HYDROCHLORIDE 200 MG/1
100 TABLET ORAL DAILY
Status: DISCONTINUED | OUTPATIENT
Start: 2017-08-14 | End: 2017-08-16 | Stop reason: HOSPADM

## 2017-08-14 RX ORDER — SIMVASTATIN 20 MG
20 TABLET ORAL NIGHTLY
Status: DISCONTINUED | OUTPATIENT
Start: 2017-08-14 | End: 2017-08-16 | Stop reason: HOSPADM

## 2017-08-14 RX ADMIN — MIDODRINE HYDROCHLORIDE 5 MG: 5 TABLET ORAL at 06:32

## 2017-08-14 RX ADMIN — SIMVASTATIN 20 MG: 20 TABLET, FILM COATED ORAL at 20:37

## 2017-08-14 RX ADMIN — MIDODRINE HYDROCHLORIDE 5 MG: 5 TABLET ORAL at 11:29

## 2017-08-14 RX ADMIN — SPIRONOLACTONE 12.5 MG: 25 TABLET, FILM COATED ORAL at 11:29

## 2017-08-14 RX ADMIN — TRIFLUOPERAZINE HYDROCHLORIDE 2 MG: 2 TABLET, FILM COATED ORAL at 08:43

## 2017-08-14 RX ADMIN — DOCUSATE SODIUM 100 MG: 100 CAPSULE, LIQUID FILLED ORAL at 08:42

## 2017-08-14 RX ADMIN — FUROSEMIDE 20 MG: 20 TABLET ORAL at 08:43

## 2017-08-14 RX ADMIN — SODIUM CHLORIDE, PRESERVATIVE FREE 10 ML: 5 INJECTION INTRAVENOUS at 08:46

## 2017-08-14 RX ADMIN — DOCUSATE SODIUM 100 MG: 100 CAPSULE, LIQUID FILLED ORAL at 20:37

## 2017-08-14 RX ADMIN — CARBIDOPA AND LEVODOPA 1 TABLET: 25; 100 TABLET ORAL at 20:37

## 2017-08-14 RX ADMIN — POTASSIUM CHLORIDE 10 MEQ: 750 TABLET, FILM COATED, EXTENDED RELEASE ORAL at 08:42

## 2017-08-14 RX ADMIN — LISINOPRIL 2.5 MG: 2.5 TABLET ORAL at 08:42

## 2017-08-14 RX ADMIN — FAMOTIDINE 20 MG: 20 TABLET ORAL at 20:37

## 2017-08-14 RX ADMIN — MIDODRINE HYDROCHLORIDE 5 MG: 5 TABLET ORAL at 16:01

## 2017-08-14 RX ADMIN — FAMOTIDINE 20 MG: 20 TABLET ORAL at 08:43

## 2017-08-14 RX ADMIN — CARBIDOPA AND LEVODOPA 1 TABLET: 25; 100 TABLET ORAL at 16:01

## 2017-08-14 RX ADMIN — CARBIDOPA AND LEVODOPA 1 TABLET: 25; 100 TABLET ORAL at 08:43

## 2017-08-14 RX ADMIN — Medication 400 MG: at 08:43

## 2017-08-14 ASSESSMENT — ENCOUNTER SYMPTOMS
COUGH: 1
NAUSEA: 0
SHORTNESS OF BREATH: 0

## 2017-08-15 ENCOUNTER — APPOINTMENT (OUTPATIENT)
Dept: GENERAL RADIOLOGY | Age: 82
DRG: 813 | End: 2017-08-15
Payer: MEDICARE

## 2017-08-15 LAB
HCT VFR BLD CALC: 41.7 % (ref 42–52)
HEMOGLOBIN: 13.7 G/DL (ref 14–18)
MCH RBC QN AUTO: 30 PG (ref 27–31.3)
MCHC RBC AUTO-ENTMCNC: 32.8 % (ref 33–37)
MCV RBC AUTO: 91.3 FL (ref 80–100)
PDW BLD-RTO: 14.2 % (ref 11.5–14.5)
PLATELET # BLD: 106 K/UL (ref 130–400)
RBC # BLD: 4.57 M/UL (ref 4.7–6.1)
WBC # BLD: 5.8 K/UL (ref 4.8–10.8)

## 2017-08-15 PROCEDURE — 6370000000 HC RX 637 (ALT 250 FOR IP): Performed by: INTERNAL MEDICINE

## 2017-08-15 PROCEDURE — 2580000003 HC RX 258: Performed by: INTERNAL MEDICINE

## 2017-08-15 PROCEDURE — 1210000000 HC MED SURG R&B

## 2017-08-15 PROCEDURE — 99232 SBSQ HOSP IP/OBS MODERATE 35: CPT | Performed by: INTERNAL MEDICINE

## 2017-08-15 PROCEDURE — 71020 XR CHEST STANDARD TWO VW: CPT

## 2017-08-15 PROCEDURE — 99223 1ST HOSP IP/OBS HIGH 75: CPT | Performed by: INTERNAL MEDICINE

## 2017-08-15 PROCEDURE — 85027 COMPLETE CBC AUTOMATED: CPT

## 2017-08-15 PROCEDURE — 36415 COLL VENOUS BLD VENIPUNCTURE: CPT

## 2017-08-15 RX ORDER — SIMVASTATIN 40 MG
20 TABLET ORAL NIGHTLY
Qty: 30 TABLET | Refills: 3 | Status: ON HOLD | OUTPATIENT
Start: 2017-08-15 | End: 2018-01-01 | Stop reason: HOSPADM

## 2017-08-15 RX ADMIN — AMIODARONE HYDROCHLORIDE 100 MG: 200 TABLET ORAL at 10:30

## 2017-08-15 RX ADMIN — DIGOXIN 125 MCG: 0.12 TABLET ORAL at 10:30

## 2017-08-15 RX ADMIN — SODIUM CHLORIDE, PRESERVATIVE FREE 10 ML: 5 INJECTION INTRAVENOUS at 22:05

## 2017-08-15 RX ADMIN — SIMVASTATIN 20 MG: 20 TABLET, FILM COATED ORAL at 22:05

## 2017-08-15 RX ADMIN — DOCUSATE SODIUM 100 MG: 100 CAPSULE, LIQUID FILLED ORAL at 22:04

## 2017-08-15 RX ADMIN — MIDODRINE HYDROCHLORIDE 5 MG: 5 TABLET ORAL at 15:59

## 2017-08-15 RX ADMIN — DOCUSATE SODIUM 100 MG: 100 CAPSULE, LIQUID FILLED ORAL at 10:29

## 2017-08-15 RX ADMIN — FAMOTIDINE 20 MG: 20 TABLET ORAL at 10:30

## 2017-08-15 RX ADMIN — CARBIDOPA AND LEVODOPA 1 TABLET: 25; 100 TABLET ORAL at 22:05

## 2017-08-15 RX ADMIN — TRIFLUOPERAZINE HYDROCHLORIDE 2 MG: 2 TABLET, FILM COATED ORAL at 10:31

## 2017-08-15 RX ADMIN — MIDODRINE HYDROCHLORIDE 5 MG: 5 TABLET ORAL at 06:22

## 2017-08-15 RX ADMIN — FUROSEMIDE 20 MG: 20 TABLET ORAL at 10:30

## 2017-08-15 RX ADMIN — POTASSIUM CHLORIDE 10 MEQ: 750 TABLET, FILM COATED, EXTENDED RELEASE ORAL at 10:30

## 2017-08-15 RX ADMIN — CARVEDILOL 6.25 MG: 6.25 TABLET, FILM COATED ORAL at 10:31

## 2017-08-15 RX ADMIN — CARBIDOPA AND LEVODOPA 1 TABLET: 25; 100 TABLET ORAL at 15:59

## 2017-08-15 RX ADMIN — Medication 400 MG: at 10:31

## 2017-08-15 RX ADMIN — SPIRONOLACTONE 12.5 MG: 25 TABLET, FILM COATED ORAL at 10:30

## 2017-08-15 RX ADMIN — FAMOTIDINE 20 MG: 20 TABLET ORAL at 22:04

## 2017-08-15 RX ADMIN — CARBIDOPA AND LEVODOPA 1 TABLET: 25; 100 TABLET ORAL at 10:30

## 2017-08-15 RX ADMIN — LISINOPRIL 2.5 MG: 2.5 TABLET ORAL at 10:31

## 2017-08-15 RX ADMIN — MIDODRINE HYDROCHLORIDE 5 MG: 5 TABLET ORAL at 10:31

## 2017-08-15 ASSESSMENT — ENCOUNTER SYMPTOMS
NAUSEA: 0
SHORTNESS OF BREATH: 0
COUGH: 1

## 2017-08-16 VITALS
HEART RATE: 79 BPM | WEIGHT: 283.73 LBS | BODY MASS INDEX: 38.43 KG/M2 | HEIGHT: 72 IN | SYSTOLIC BLOOD PRESSURE: 97 MMHG | DIASTOLIC BLOOD PRESSURE: 66 MMHG | TEMPERATURE: 98.2 F | OXYGEN SATURATION: 96 % | RESPIRATION RATE: 18 BRPM

## 2017-08-16 LAB
BLOOD CULTURE, ROUTINE: NORMAL
CULTURE, BLOOD 2: NORMAL

## 2017-08-16 PROCEDURE — 97530 THERAPEUTIC ACTIVITIES: CPT

## 2017-08-16 PROCEDURE — 6370000000 HC RX 637 (ALT 250 FOR IP): Performed by: INTERNAL MEDICINE

## 2017-08-16 PROCEDURE — 2580000003 HC RX 258: Performed by: INTERNAL MEDICINE

## 2017-08-16 RX ADMIN — POTASSIUM CHLORIDE 10 MEQ: 750 TABLET, FILM COATED, EXTENDED RELEASE ORAL at 09:15

## 2017-08-16 RX ADMIN — FAMOTIDINE 20 MG: 20 TABLET ORAL at 09:11

## 2017-08-16 RX ADMIN — SODIUM CHLORIDE, PRESERVATIVE FREE 10 ML: 5 INJECTION INTRAVENOUS at 09:17

## 2017-08-16 RX ADMIN — CARBIDOPA AND LEVODOPA 1 TABLET: 25; 100 TABLET ORAL at 14:00

## 2017-08-16 RX ADMIN — Medication 400 MG: at 09:11

## 2017-08-16 RX ADMIN — CARBIDOPA AND LEVODOPA 1 TABLET: 25; 100 TABLET ORAL at 09:10

## 2017-08-16 RX ADMIN — FUROSEMIDE 20 MG: 20 TABLET ORAL at 09:13

## 2017-08-16 RX ADMIN — SPIRONOLACTONE 12.5 MG: 25 TABLET, FILM COATED ORAL at 09:14

## 2017-08-16 RX ADMIN — DOCUSATE SODIUM 100 MG: 100 CAPSULE, LIQUID FILLED ORAL at 09:15

## 2017-08-16 RX ADMIN — AMIODARONE HYDROCHLORIDE 100 MG: 200 TABLET ORAL at 09:14

## 2017-08-16 RX ADMIN — TRIFLUOPERAZINE HYDROCHLORIDE 2 MG: 2 TABLET, FILM COATED ORAL at 09:10

## 2017-08-16 ASSESSMENT — ENCOUNTER SYMPTOMS
NAUSEA: 0
COUGH: 0
SHORTNESS OF BREATH: 0

## 2017-08-16 ASSESSMENT — PAIN SCALES - GENERAL: PAINLEVEL_OUTOF10: 0

## 2017-08-31 ENCOUNTER — OFFICE VISIT (OUTPATIENT)
Dept: PULMONOLOGY | Age: 82
End: 2017-08-31

## 2017-08-31 VITALS
HEART RATE: 87 BPM | SYSTOLIC BLOOD PRESSURE: 100 MMHG | WEIGHT: 281 LBS | TEMPERATURE: 96.6 F | HEIGHT: 72 IN | BODY MASS INDEX: 38.06 KG/M2 | DIASTOLIC BLOOD PRESSURE: 64 MMHG | OXYGEN SATURATION: 96 % | RESPIRATION RATE: 12 BRPM

## 2017-08-31 DIAGNOSIS — J44.9 COPD, MILD (HCC): ICD-10-CM

## 2017-08-31 DIAGNOSIS — R04.2 HEMOPTYSIS: Primary | ICD-10-CM

## 2017-08-31 DIAGNOSIS — J18.9 PNEUMONIA OF RIGHT LOWER LOBE DUE TO INFECTIOUS ORGANISM: ICD-10-CM

## 2017-08-31 PROCEDURE — 4040F PNEUMOC VAC/ADMIN/RCVD: CPT | Performed by: INTERNAL MEDICINE

## 2017-08-31 PROCEDURE — 3023F SPIROM DOC REV: CPT | Performed by: INTERNAL MEDICINE

## 2017-08-31 PROCEDURE — 99214 OFFICE O/P EST MOD 30 MIN: CPT | Performed by: INTERNAL MEDICINE

## 2017-08-31 PROCEDURE — 1123F ACP DISCUSS/DSCN MKR DOCD: CPT | Performed by: INTERNAL MEDICINE

## 2017-08-31 PROCEDURE — G8926 SPIRO NO PERF OR DOC: HCPCS | Performed by: INTERNAL MEDICINE

## 2017-08-31 PROCEDURE — G8419 CALC BMI OUT NRM PARAM NOF/U: HCPCS | Performed by: INTERNAL MEDICINE

## 2017-08-31 PROCEDURE — G8427 DOCREV CUR MEDS BY ELIG CLIN: HCPCS | Performed by: INTERNAL MEDICINE

## 2017-08-31 PROCEDURE — 1111F DSCHRG MED/CURRENT MED MERGE: CPT | Performed by: INTERNAL MEDICINE

## 2017-08-31 PROCEDURE — G8598 ASA/ANTIPLAT THER USED: HCPCS | Performed by: INTERNAL MEDICINE

## 2017-08-31 PROCEDURE — 1036F TOBACCO NON-USER: CPT | Performed by: INTERNAL MEDICINE

## 2017-08-31 RX ORDER — ALBUTEROL SULFATE 90 UG/1
2 AEROSOL, METERED RESPIRATORY (INHALATION) EVERY 6 HOURS PRN
Qty: 1 INHALER | Refills: 5 | Status: SHIPPED | OUTPATIENT
Start: 2017-08-31 | End: 2018-01-01 | Stop reason: SDUPTHER

## 2017-08-31 ASSESSMENT — ENCOUNTER SYMPTOMS
WHEEZING: 0
VOMITING: 0
NAUSEA: 0
SHORTNESS OF BREATH: 1
VOICE CHANGE: 0
SORE THROAT: 0
EYE ITCHING: 0
DIARRHEA: 0
RHINORRHEA: 0
COUGH: 0
ABDOMINAL PAIN: 0
CHEST TIGHTNESS: 0

## 2017-09-19 ENCOUNTER — HOSPITAL ENCOUNTER (OUTPATIENT)
Dept: CARDIOLOGY | Age: 82
Discharge: HOME | End: 2017-09-19
Payer: MEDICARE

## 2017-09-19 PROCEDURE — 93290 INTERROG DEV EVAL ICPMS IP: CPT

## 2017-09-19 PROCEDURE — 93283 PRGRMG EVAL IMPLANTABLE DFB: CPT

## 2017-10-26 ENCOUNTER — OFFICE VISIT (OUTPATIENT)
Dept: PULMONOLOGY | Age: 82
End: 2017-10-26

## 2017-10-26 VITALS
HEIGHT: 73 IN | TEMPERATURE: 97.5 F | HEART RATE: 81 BPM | BODY MASS INDEX: 30.88 KG/M2 | SYSTOLIC BLOOD PRESSURE: 102 MMHG | WEIGHT: 233 LBS | DIASTOLIC BLOOD PRESSURE: 56 MMHG | OXYGEN SATURATION: 93 %

## 2017-10-26 DIAGNOSIS — J18.9 PNEUMONIA OF RIGHT LOWER LOBE DUE TO INFECTIOUS ORGANISM: ICD-10-CM

## 2017-10-26 DIAGNOSIS — R04.2 HEMOPTYSIS: Primary | ICD-10-CM

## 2017-10-26 DIAGNOSIS — J44.9 COPD, MILD (HCC): ICD-10-CM

## 2017-10-26 PROCEDURE — G8417 CALC BMI ABV UP PARAM F/U: HCPCS | Performed by: INTERNAL MEDICINE

## 2017-10-26 PROCEDURE — G8598 ASA/ANTIPLAT THER USED: HCPCS | Performed by: INTERNAL MEDICINE

## 2017-10-26 PROCEDURE — G8427 DOCREV CUR MEDS BY ELIG CLIN: HCPCS | Performed by: INTERNAL MEDICINE

## 2017-10-26 PROCEDURE — 99213 OFFICE O/P EST LOW 20 MIN: CPT | Performed by: INTERNAL MEDICINE

## 2017-10-26 PROCEDURE — 1036F TOBACCO NON-USER: CPT | Performed by: INTERNAL MEDICINE

## 2017-10-26 PROCEDURE — 1123F ACP DISCUSS/DSCN MKR DOCD: CPT | Performed by: INTERNAL MEDICINE

## 2017-10-26 PROCEDURE — 3023F SPIROM DOC REV: CPT | Performed by: INTERNAL MEDICINE

## 2017-10-26 PROCEDURE — G8484 FLU IMMUNIZE NO ADMIN: HCPCS | Performed by: INTERNAL MEDICINE

## 2017-10-26 PROCEDURE — 4040F PNEUMOC VAC/ADMIN/RCVD: CPT | Performed by: INTERNAL MEDICINE

## 2017-10-26 PROCEDURE — G8926 SPIRO NO PERF OR DOC: HCPCS | Performed by: INTERNAL MEDICINE

## 2017-10-26 RX ORDER — DIPHENHYDRAMINE HCL 12.5MG/5ML
LIQUID (ML) ORAL 4 TIMES DAILY PRN
Status: ON HOLD | COMMUNITY
End: 2018-01-01 | Stop reason: ALTCHOICE

## 2017-10-26 RX ORDER — LANOLIN ALCOHOL/MO/W.PET/CERES
3 CREAM (GRAM) TOPICAL DAILY
Status: ON HOLD | COMMUNITY
End: 2018-01-01 | Stop reason: ALTCHOICE

## 2017-10-26 ASSESSMENT — ENCOUNTER SYMPTOMS
ABDOMINAL PAIN: 0
WHEEZING: 0
DIARRHEA: 0
RHINORRHEA: 0
VOICE CHANGE: 0
VOMITING: 0
EYE ITCHING: 0
SORE THROAT: 0
COUGH: 0
NAUSEA: 0
SHORTNESS OF BREATH: 1
CHEST TIGHTNESS: 0

## 2017-10-26 NOTE — PROGRESS NOTES
Subjective:     Hipolito Rosario is a 80 y.o. male who complains today of:     Chief Complaint   Patient presents with    COPD     follow up        HPI  Patient said blood thinner was stopped in hospital and no more hemoptysis. C/o shortness of breath , worse with exertion. He is on proair HFA  No Wheezing   No Cough with  Sputum  No Hemoptysis  No Chest pain or pleuritic pain  No Fever or chills. C/o  Rhinorrhea and  postnasal drip. Allergies:  Review of patient's allergies indicates no known allergies. Past Medical History:   Diagnosis Date    CAD (coronary artery disease)     Hyperlipidemia      Past Surgical History:   Procedure Laterality Date    AORTIC VALVE REPLACEMENT      [facility]ORT[facility]    [facility]    COLONOSCOPY  5/1/15    w/polypectomy     PACEMAKER INSERTION      TONSILLECTOMY      UPPER GASTROINTESTINAL ENDOSCOPY  3/2/15    w/bx,dilation     UPPER GASTROINTESTINAL ENDOSCOPY  3/27/15    w/dilation      Family History   Problem Relation Age of Onset    Heart Disease Mother     COPD Father     Cancer Brother      Unknown type     Social History     Social History    Marital status:      Spouse name: N/A    Number of children: N/A    Years of education: N/A     Occupational History    Not on file. Social History Main Topics    Smoking status: Former Smoker     Packs/day: 2.00     Years: 22.00     Quit date: 1983    Smokeless tobacco: Never Used    Alcohol use No    Drug use: No    Sexual activity: Not on file     Other Topics Concern    Not on file     Social History Narrative    No narrative on file         Review of Systems   Constitutional: Negative for chills, diaphoresis, fatigue and fever. HENT: Negative for congestion, mouth sores, nosebleeds, postnasal drip, rhinorrhea, sneezing, sore throat and voice change. Eyes: Negative for itching and visual disturbance.    Respiratory: Positive for shortness Prescriptions   Medication Sig Dispense Refill    aspirin 81 MG tablet Take 81 mg by mouth daily      melatonin 3 MG TABS tablet Take 3 mg by mouth daily      diphenhydrAMINE (BENADRYL) 12.5 MG/5ML elixir Take by mouth 4 times daily as needed for Allergies      albuterol sulfate HFA (VENTOLIN HFA) 108 (90 Base) MCG/ACT inhaler Inhale 2 puffs into the lungs every 6 hours as needed for Wheezing 1 Inhaler 5    simvastatin (ZOCOR) 40 MG tablet Take 0.5 tablets by mouth nightly 30 tablet 3    magnesium oxide (MAG-OX) 400 MG tablet Take 400 mg by mouth daily      DIGOXIN PO Take 0.125 mcg by mouth every other day      amiodarone (CORDARONE) 200 MG tablet Take 100 mg by mouth daily       KLOR-CON M10 10 MEQ tablet       carvedilol (COREG) 25 MG tablet Take 12.5 mg by mouth daily       spironolactone (ALDACTONE) 25 MG tablet 12.5 mg daily       furosemide (LASIX) 40 MG tablet       lisinopril (ZESTRIL) 5 MG tablet Take 2.5 mg by mouth daily       trifluoperazine (STELAZINE) 2 MG tablet Take 2 mg by mouth.  HYDROcodone-acetaminophen (NORCO) 5-325 MG per tablet Take 1 tablet by mouth every 6 hours as needed for Pain . 16 tablet 0    ALPRAZolam (XANAX) 0.25 MG tablet       carbidopa-levodopa (SINEMET)  MG per tablet       gentamicin (GARAMYCIN) 0.3 % ophthalmic solution       clonazePAM (KLONOPIN) 0.5 MG tablet Take 0.5 mg by mouth.  Risedronate Sodium (ATELVIA) 35 MG TBEC Take 35 mg by mouth. No current facility-administered medications for this visit. Results for orders placed during the hospital encounter of 08/10/17   XR Chest Standard TWO VW    Narrative EXAMINATION: XR CHEST STANDARD TWO VW    CLINICAL HISTORY:  Follow-up pulmonary edema and alveolar hemorrhage     COMPARISONS: Two-view chest x-ray. August 10, 2017 CT thorax.     FINDINGS: Frontal and lateral views of the chest were obtained showing the patchy, groundglass infiltrates and/or edema visible on prior

## 2017-11-06 ENCOUNTER — HOSPITAL ENCOUNTER (EMERGENCY)
Age: 82
Discharge: HOME | End: 2017-11-06
Attending: EMERGENCY MEDICINE
Payer: MEDICARE

## 2017-11-06 VITALS
WEIGHT: 180 LBS | RESPIRATION RATE: 20 BRPM | OXYGEN SATURATION: 96 % | TEMPERATURE: 97.8 F | HEIGHT: 72 IN | DIASTOLIC BLOOD PRESSURE: 76 MMHG | HEART RATE: 76 BPM | SYSTOLIC BLOOD PRESSURE: 130 MMHG | BODY MASS INDEX: 24.38 KG/M2

## 2017-11-06 DIAGNOSIS — K08.89 PAIN, DENTAL: Primary | ICD-10-CM

## 2017-11-06 PROCEDURE — 6370000000 HC RX 637 (ALT 250 FOR IP): Performed by: EMERGENCY MEDICINE

## 2017-11-06 PROCEDURE — 99282 EMERGENCY DEPT VISIT SF MDM: CPT

## 2017-11-06 RX ORDER — HYDROCODONE BITARTRATE AND ACETAMINOPHEN 5; 325 MG/1; MG/1
1 TABLET ORAL ONCE
Status: COMPLETED | OUTPATIENT
Start: 2017-11-06 | End: 2017-11-06

## 2017-11-06 RX ORDER — HYDROCODONE BITARTRATE AND ACETAMINOPHEN 5; 325 MG/1; MG/1
1 TABLET ORAL EVERY 6 HOURS PRN
Qty: 20 TABLET | Refills: 0 | Status: SHIPPED | OUTPATIENT
Start: 2017-11-06 | End: 2017-11-13

## 2017-11-06 RX ORDER — PENICILLIN V POTASSIUM 500 MG/1
500 TABLET ORAL 4 TIMES DAILY
Qty: 28 TABLET | Refills: 0 | Status: SHIPPED | OUTPATIENT
Start: 2017-11-06 | End: 2017-11-13

## 2017-11-06 RX ORDER — PENICILLIN V POTASSIUM 250 MG/1
500 TABLET ORAL ONCE
Status: COMPLETED | OUTPATIENT
Start: 2017-11-06 | End: 2017-11-06

## 2017-11-06 RX ADMIN — HYDROCODONE BITARTRATE AND ACETAMINOPHEN 1 TABLET: 5; 325 TABLET ORAL at 08:47

## 2017-11-06 RX ADMIN — PENICILLIN V POTASSIUM 500 MG: 250 TABLET, FILM COATED ORAL at 08:47

## 2017-11-06 ASSESSMENT — PAIN SCALES - GENERAL: PAINLEVEL_OUTOF10: 5

## 2017-11-13 ASSESSMENT — ENCOUNTER SYMPTOMS
EYE DISCHARGE: 0
COLOR CHANGE: 0
SHORTNESS OF BREATH: 0
RHINORRHEA: 0
ABDOMINAL PAIN: 0
VOMITING: 0
FACIAL SWELLING: 0

## 2017-11-14 NOTE — ED PROVIDER NOTES
[address] [facility] ED  eMERGENCY dEPARTMENT eNCOUnter      Pt Name: Arthur Cummings  MRN: [id number]  [place] 8/3/1932  Date of evaluation: 11/6/2017  Provider: Amarilis Rivas [address] [place] [address]       Chief Complaint   Patient presents with    Dental Pain     and  jaw swelling         HISTORY OF PRESENT ILLNESS   (Location/Symptom, Timing/Onset, Context/Setting, Quality, Duration, Modifying Factors, Severity)  Note limiting factors. Arthur Cummings is a 80 y.o. male who presents to the emergency department With a chief complaint of dental pain. He denies having seen a dentist and he denies any concern for abscess. HPI    Nursing Notes were reviewed. REVIEW OF SYSTEMS    (2-9 systems for level 4, 10 or more for level 5)     Review of Systems   Constitutional: Negative for activity change, appetite change and fatigue. HENT: Positive for dental problem. Negative for congestion, facial swelling and rhinorrhea. Eyes: Negative for discharge. Respiratory: Negative for shortness of breath. Cardiovascular: Negative for chest pain. Gastrointestinal: Negative for abdominal pain and vomiting. Endocrine: Negative for cold intolerance. Musculoskeletal: Negative for arthralgias and myalgias. Skin: Negative for color change. Allergic/Immunologic: Negative for environmental allergies. Neurological: Negative for dizziness and light-headedness. Hematological: Negative for adenopathy. Psychiatric/Behavioral: Negative for behavioral problems. Except as noted above the remainder of the review of systems was reviewed and negative.        PAST MEDICAL HISTORY     Past Medical History:   Diagnosis Date    CAD (coronary artery disease)     Hyperlipidemia          SURGICAL HISTORY       Past Surgical History:   Procedure Laterality Date    AORTIC VALVE REPLACEMENT      [facility]  1999    [facility]    COLONOSCOPY  5/1/15    w/polypectomy     PACEMAKER INSERTION  TONSILLECTOMY      UPPER GASTROINTESTINAL ENDOSCOPY  3/2/15    w/bx,dilation     UPPER GASTROINTESTINAL ENDOSCOPY  3/27/15    w/dilation          CURRENT MEDICATIONS       Discharge Medication List as of 11/6/2017  8:32 AM      CONTINUE these medications which have NOT CHANGED    Details   aspirin 81 MG tablet Take 81 mg by mouth dailyHistorical Med      melatonin 3 MG TABS tablet Take 3 mg by mouth dailyHistorical Med      diphenhydrAMINE (BENADRYL) 12.5 MG/5ML elixir Take by mouth 4 times daily as needed for AllergiesHistorical Med      albuterol sulfate HFA (VENTOLIN HFA) 108 (90 Base) MCG/ACT inhaler Inhale 2 puffs into the lungs every 6 hours as needed for Wheezing, Disp-1 Inhaler, R-5Normal      simvastatin (ZOCOR) 40 MG tablet Take 0.5 tablets by mouth nightly, Disp-30 tablet, R-3Normal      magnesium oxide (MAG-OX) 400 MG tablet Take 400 mg by mouth daily      DIGOXIN PO Take 0.125 mcg by mouth every other day      amiodarone (CORDARONE) 200 MG tablet Take 100 mg by mouth daily       ALPRAZolam (XANAX) 0.25 MG tablet Historical Med      carbidopa-levodopa (SINEMET)  MG per tablet Historical Med      KLOR-CON M10 10 MEQ tablet Historical Med      carvedilol (COREG) 25 MG tablet Take 12.5 mg by mouth daily       spironolactone (ALDACTONE) 25 MG tablet 12.5 mg daily       furosemide (LASIX) 40 MG tablet Historical Med      lisinopril (ZESTRIL) 5 MG tablet Take 2.5 mg by mouth daily       trifluoperazine (STELAZINE) 2 MG tablet Take 2 mg by mouth.      gentamicin (GARAMYCIN) 0.3 % ophthalmic solution Historical Med      clonazePAM (KLONOPIN) 0.5 MG tablet Take 0.5 mg by mouth. Risedronate Sodium (ATELVIA) 35 MG TBEC Take 35 mg by mouth. ALLERGIES     Review of patient's allergies indicates no known allergies.     FAMILY HISTORY       Family History   Problem Relation Age of Onset    Heart Disease Mother     COPD Father     Cancer Brother      Unknown type SOCIAL HISTORY       Social History     Social History    Marital status:      Spouse name: N/A    Number of children: N/A    Years of education: N/A     Social History Main Topics    Smoking status: Former Smoker     Packs/day: 2.00     Years: 22.00     Quit date: 1983    Smokeless tobacco: Never Used    Alcohol use No    Drug use: No    Sexual activity: Not Asked     Other Topics Concern    None     Social History Narrative    None       SCREENINGS    Tamms Coma Scale  Eye Opening: Spontaneous  Best Verbal Response: Oriented  Best Motor Response: Obeys commands  Marilyn Coma Scale Score: 15        PHYSICAL EXAM    (up to 7 for level 4, 8 or more for level 5)     ED Triage Vitals [11/06/17 0812]   BP Temp Temp Source Pulse Resp SpO2 Height Weight   110/72 97.8 °F (36.6 °C) Oral 85 18 98 % 6' (1.829 m) 230 lb (104.3 kg)       Physical Exam   Constitutional: He is oriented to person, place, and time. He appears well-developed and well-nourished. HENT:   Head: Normocephalic and atraumatic. Poor dentition globally with gingivitis. No signs of abscess or significant facial cellulitis   Eyes: Conjunctivae and EOM are normal. Pupils are equal, round, and reactive to light. Neck: Normal range of motion. Neck supple. Cardiovascular: Normal rate. Pulmonary/Chest: Effort normal.   Abdominal: Soft. Bowel sounds are normal.   Musculoskeletal: Normal range of motion. Neurological: He is alert and oriented to person, place, and time. He has normal reflexes. Skin: Skin is warm and dry. Psychiatric: He has a normal mood and affect.        DIAGNOSTIC RESULTS     EKG: All EKG's are interpreted by the Emergency Department Physician who either signs or Co-signs this chart in the absence of a cardiologist.        RADIOLOGY:   Non-plain film images such as CT, Ultrasound and MRI are read by the radiologist. Plain radiographic images are visualized and preliminarily interpreted by the emergency

## 2017-12-06 NOTE — ED TRIAGE NOTES
Pt states he has sinus congestion and drainage x1 week. Pt states he went to his ENT and they advised as long as he doesn't have any bleeding not to worry about it. Pt states drainage is running down his throat making him nauseated. Pt denies any v/d, fever, chills, or cough at this time. Pt is a+o x4 lungs clear bilat. Skin warm pink and dry.  Pt states he just wants something to stop the drainage and something to make him not as nauseated at this time

## 2017-12-06 NOTE — ED PROVIDER NOTES
[address] [facility] ED  eMERGENCY dEPARTMENT eNCOUnter      Pt Name: Elma Brito  MRN: [id number]  [place] 8/3/1932  Date of evaluation: 12/6/2017  Provider: Denise Carbajal [id number]       Chief Complaint   Patient presents with    Sinusitis     pt states he has sinus drainage x1 week with yellow mucus         HISTORY OF PRESENT ILLNESS  (Location/Symptom, Timing/Onset, Context/Setting, Quality, Duration, Modifying Factors, Severity.)   Elma Brito is a 80 y.o. male who presents to the emergency department For evaluation of sinus drainage for the last month. He admits to a long-standing history of sinus problems and follow-up with ENT. He continues to follow with ENT but was told at a recent appointment to continue to follow up but no surgery will be performed unless he starts bleeding from his nose. He admits the sinus drainage has been bothering him more than in the past and he feels he needs an antibiotic. He denies any associated headache dizziness lightheadedness fever sweats or chills chest pain shortness of breath nausea vomiting diarrhea constipation or abdominal pain. HPI    Nursing Notes were reviewed and I agree. REVIEW OF SYSTEMS    (2-9 systems for level 4, 10 or more for level 5)     Review of Systems   Constitutional: Negative for chills, diaphoresis, fatigue and fever. HENT: Positive for postnasal drip, rhinorrhea and sinus pressure. Negative for congestion, drooling, trouble swallowing and voice change. Respiratory: Negative for cough and shortness of breath. Cardiovascular: Negative for chest pain and palpitations. Gastrointestinal: Negative for abdominal pain, diarrhea, nausea and vomiting. Genitourinary: Negative for dysuria and flank pain. Skin: Negative for rash. Neurological: Negative for dizziness, light-headedness and headaches. Except as noted above the remainder of the review of systems was reviewed and negative.        PAST MEDICAL HISTORY     Past Medical History:   Diagnosis Date    CAD (coronary artery disease)     Hyperlipidemia     Parkinson's disease ([facility] [address]          SURGICAL HISTORY       Past Surgical History:   Procedure Laterality Date    AORTIC VALVE REPLACEMENT      [facility]    [facility]    COLONOSCOPY  5/1/15    w/polypectomy     PACEMAKER INSERTION      TONSILLECTOMY      UPPER GASTROINTESTINAL ENDOSCOPY  3/2/15    w/bx,dilation     UPPER GASTROINTESTINAL ENDOSCOPY  3/27/15    w/dilation          CURRENT MEDICATIONS       Previous Medications    ALBUTEROL SULFATE HFA (VENTOLIN HFA) 108 (90 BASE) MCG/ACT INHALER    Inhale 2 puffs into the lungs every 6 hours as needed for Wheezing    ALPRAZOLAM (XANAX) 0.25 MG TABLET        AMIODARONE (CORDARONE) 200 MG TABLET    Take 100 mg by mouth daily     ASPIRIN 81 MG TABLET    Take 81 mg by mouth daily    CARBIDOPA-LEVODOPA (SINEMET)  MG PER TABLET        CARVEDILOL (COREG) 25 MG TABLET    Take 12.5 mg by mouth daily     CLONAZEPAM (KLONOPIN) 0.5 MG TABLET    Take 0.5 mg by mouth. DIGOXIN PO    Take 0.125 mcg by mouth every other day    DIPHENHYDRAMINE (BENADRYL) 12.5 MG/5ML ELIXIR    Take by mouth 4 times daily as needed for Allergies    FUROSEMIDE (LASIX) 40 MG TABLET        GENTAMICIN (GARAMYCIN) 0.3 % OPHTHALMIC SOLUTION        KLOR-CON M10 10 MEQ TABLET        LISINOPRIL (ZESTRIL) 5 MG TABLET    Take 2.5 mg by mouth daily     MAGNESIUM OXIDE (MAG-OX) 400 MG TABLET    Take 400 mg by mouth daily    MELATONIN 3 MG TABS TABLET    Take 3 mg by mouth daily    RISEDRONATE SODIUM (ATELVIA) 35 MG TBEC    Take 35 mg by mouth. SIMVASTATIN (ZOCOR) 40 MG TABLET    Take 0.5 tablets by mouth nightly    SPIRONOLACTONE (ALDACTONE) 25 MG TABLET    12.5 mg daily     TRIFLUOPERAZINE (STELAZINE) 2 MG TABLET    Take 2 mg by mouth. ALLERGIES     Review of patient's allergies indicates no known allergies.     FAMILY HISTORY       Family History   Problem Relation Age of Onset    Heart Disease Mother     COPD Father     Cancer Brother      Unknown type          SOCIAL HISTORY       Social History     Social History    Marital status:      Spouse name: N/A    Number of children: N/A    Years of education: N/A     Social History Main Topics    Smoking status: Former Smoker     Packs/day: 2.00     Years: 22.00     Quit date: 1983    Smokeless tobacco: Never Used    Alcohol use No    Drug use: No    Sexual activity: Not Asked     Other Topics Concern    None     Social History Narrative    None       SCREENINGS           PHYSICAL EXAM    (up to 7 for level 4, 8 or more for level 5)     ED Triage Vitals [12/06/17 0744]   BP Temp Temp Source Pulse Resp SpO2 Height Weight   -- 97.8 °F (36.6 °C) Oral 92 18 98 % 6' (1.829 m) 230 lb (104.3 kg)       Physical Exam   Constitutional: He is oriented to person, place, and time. He appears well-developed and well-nourished. He is active. No distress. HENT:   Head: Normocephalic and atraumatic. Right Ear: Tympanic membrane normal.   Left Ear: Tympanic membrane normal.   Nose: Mucosal edema and rhinorrhea present. Right sinus exhibits maxillary sinus tenderness and frontal sinus tenderness. Left sinus exhibits maxillary sinus tenderness and frontal sinus tenderness. Mouth/Throat: Mucous membranes are normal.   Neck: Normal range of motion. Neck supple. Cardiovascular: Normal rate, regular rhythm, normal heart sounds, intact distal pulses and normal pulses. Pulmonary/Chest: Effort normal and breath sounds normal.   Abdominal: Soft. Normal appearance and bowel sounds are normal. There is no tenderness. Neurological: He is alert and oriented to person, place, and time. He has normal strength. Skin: Skin is warm, dry and intact. No rash noted. He is not diaphoretic. Nursing note and vitals reviewed.         DIAGNOSTIC RESULTS     RADIOLOGY:   Non-plain film images such as CT, Ultrasound and MRI are read by the radiologist. Plain radiographic images are visualized and preliminarily interpreted by Valerie Casas CNP with the below findings:        Interpretation per the Radiologist below, if available at the time of this note:    CT SINUS WO CONTRAST   Final Result   NO SINUSITIS         All CT scans at this facility use dose modulation, iterative reconstruction, and/or weight based dosing when appropriate to reduce radiation dose to as low as reasonably achievable. LABS:  Labs Reviewed - No data to display    All other labs were within normal range or not returned as of this dictation. EMERGENCY DEPARTMENT COURSE and DIFFERENTIAL DIAGNOSIS/MDM:   Vitals:    Vitals:    12/06/17 0744 12/06/17 0905   BP:  103/89   Pulse: 92 89   Resp: 18 18   Temp: 97.8 °F (36.6 °C)    TempSrc: Oral    SpO2: 98% 99%   Weight: 230 lb (104.3 kg)    Height: 6' (1.829 m)        ED Course        MDM patient appears nontoxic and in no distress. He does request antibiotic for sinusitis type symptoms. He does have rhinorrhea and mucosal edema on exam which would be consistent with sinusitis. There is no purulence. He does also state an occasional cough. He has not coughed for the last 2 weeks. CT of the facial bones and sinuses was obtained and did not demonstrate acute sinusitis. As the patient wishes and will prescribe him antibiotic as well as supportive medications. He was reevaluated and continues to appear well and without distress. Will be discharged home in stable condition. Standard anticipatory guidance given to patient upon discharge. Have given them a specific time frame in which to follow-up and who to follow-up with. I have also advised them that they should return to the emergency department if they get worse, or not getting better or develop any new or concerning symptoms. Patient demonstrates understanding.     PROCEDURES:    Procedures      FINAL IMPRESSION      1.

## 2018-01-01 ENCOUNTER — ANESTHESIA (OUTPATIENT)
Dept: OPERATING ROOM | Age: 83
DRG: 064 | End: 2018-01-01
Payer: MEDICARE

## 2018-01-01 ENCOUNTER — HOSPITAL ENCOUNTER (EMERGENCY)
Age: 83
Discharge: HOME | End: 2018-04-19
Attending: EMERGENCY MEDICINE
Payer: MEDICARE

## 2018-01-01 ENCOUNTER — APPOINTMENT (OUTPATIENT)
Dept: CT IMAGING | Age: 83
DRG: 064 | End: 2018-01-01
Payer: MEDICARE

## 2018-01-01 ENCOUNTER — APPOINTMENT (OUTPATIENT)
Dept: CT IMAGING | Age: 83
DRG: 871 | End: 2018-01-01
Payer: MEDICARE

## 2018-01-01 ENCOUNTER — APPOINTMENT (OUTPATIENT)
Dept: GENERAL RADIOLOGY | Age: 83
DRG: 871 | End: 2018-01-01
Payer: MEDICARE

## 2018-01-01 ENCOUNTER — APPOINTMENT (OUTPATIENT)
Dept: CT IMAGING | Age: 83
End: 2018-01-01
Payer: MEDICARE

## 2018-01-01 ENCOUNTER — APPOINTMENT (OUTPATIENT)
Dept: GENERAL RADIOLOGY | Age: 83
DRG: 064 | End: 2018-01-01
Payer: MEDICARE

## 2018-01-01 ENCOUNTER — HOSPITAL ENCOUNTER (EMERGENCY)
Age: 83
Discharge: HOME | End: 2018-07-27
Attending: EMERGENCY MEDICINE
Payer: MEDICARE

## 2018-01-01 ENCOUNTER — OFFICE VISIT (OUTPATIENT)
Dept: PULMONOLOGY | Age: 83
End: 2018-01-01
Payer: MEDICARE

## 2018-01-01 ENCOUNTER — APPOINTMENT (OUTPATIENT)
Dept: INTERVENTIONAL RADIOLOGY/VASCULAR | Age: 83
DRG: 871 | End: 2018-01-01
Payer: MEDICARE

## 2018-01-01 ENCOUNTER — ANESTHESIA EVENT (OUTPATIENT)
Dept: OPERATING ROOM | Age: 83
DRG: 064 | End: 2018-01-01
Payer: MEDICARE

## 2018-01-01 ENCOUNTER — HOSPITAL ENCOUNTER (OUTPATIENT)
Dept: CARDIOLOGY | Age: 83
Discharge: HOME | End: 2018-09-18
Payer: MEDICARE

## 2018-01-01 ENCOUNTER — HOSPITAL ENCOUNTER (EMERGENCY)
Age: 83
Discharge: HOME | DRG: 064 | End: 2018-10-21
Payer: MEDICARE

## 2018-01-01 ENCOUNTER — HOSPITAL ENCOUNTER (EMERGENCY)
Age: 83
Discharge: HOME | End: 2018-05-15
Payer: MEDICARE

## 2018-01-01 ENCOUNTER — HOSPITAL ENCOUNTER (INPATIENT)
Age: 83
LOS: 2 days | DRG: 871 | End: 2018-11-23
Attending: EMERGENCY MEDICINE | Admitting: INTERNAL MEDICINE
Payer: MEDICARE

## 2018-01-01 ENCOUNTER — HOSPITAL ENCOUNTER (OUTPATIENT)
Dept: CARDIOLOGY | Age: 83
Discharge: HOME | End: 2018-11-19
Payer: MEDICARE

## 2018-01-01 ENCOUNTER — ANESTHESIA (OUTPATIENT)
Dept: OPERATING ROOM | Age: 83
End: 2018-01-01
Payer: MEDICARE

## 2018-01-01 ENCOUNTER — APPOINTMENT (OUTPATIENT)
Dept: GENERAL RADIOLOGY | Age: 83
End: 2018-01-01
Payer: MEDICARE

## 2018-01-01 ENCOUNTER — HOSPITAL ENCOUNTER (INPATIENT)
Age: 83
LOS: 11 days | Discharge: NURSING, REHAB OR LONG-TERM CARE | DRG: 064 | End: 2018-11-02
Attending: INTERNAL MEDICINE | Admitting: INTERNAL MEDICINE
Payer: MEDICARE

## 2018-01-01 ENCOUNTER — HOSPITAL ENCOUNTER (OUTPATIENT)
Dept: CARDIOLOGY | Age: 83
Discharge: HOME | End: 2018-10-17
Payer: MEDICARE

## 2018-01-01 ENCOUNTER — HOSPITAL ENCOUNTER (OUTPATIENT)
Dept: PULMONOLOGY | Age: 83
Discharge: HOME | End: 2018-03-12
Payer: MEDICARE

## 2018-01-01 ENCOUNTER — HOSPITAL ENCOUNTER (EMERGENCY)
Age: 83
Discharge: HOME | End: 2018-05-02
Payer: MEDICARE

## 2018-01-01 ENCOUNTER — HOSPITAL ENCOUNTER (OUTPATIENT)
Dept: CARDIOLOGY | Age: 83
Discharge: HOME | End: 2018-07-17
Payer: MEDICARE

## 2018-01-01 ENCOUNTER — APPOINTMENT (OUTPATIENT)
Dept: ULTRASOUND IMAGING | Age: 83
DRG: 064 | End: 2018-01-01
Payer: MEDICARE

## 2018-01-01 ENCOUNTER — HOSPITAL ENCOUNTER (OUTPATIENT)
Dept: CARDIOLOGY | Age: 83
Discharge: HOME | End: 2018-03-20
Payer: MEDICARE

## 2018-01-01 ENCOUNTER — HOSPITAL ENCOUNTER (OUTPATIENT)
Dept: CARDIOLOGY | Age: 83
Discharge: HOME | End: 2018-06-19
Payer: MEDICARE

## 2018-01-01 ENCOUNTER — HOSPITAL ENCOUNTER (EMERGENCY)
Age: 83
Discharge: HOME | End: 2018-11-21
Payer: MEDICARE

## 2018-01-01 ENCOUNTER — HOSPITAL ENCOUNTER (OUTPATIENT)
Age: 83
Setting detail: OBSERVATION
Discharge: HOME | End: 2018-07-16
Attending: INTERNAL MEDICINE | Admitting: INTERNAL MEDICINE
Payer: MEDICARE

## 2018-01-01 ENCOUNTER — ANESTHESIA EVENT (OUTPATIENT)
Dept: OPERATING ROOM | Age: 83
End: 2018-01-01
Payer: MEDICARE

## 2018-01-01 VITALS
HEART RATE: 62 BPM | HEIGHT: 71 IN | BODY MASS INDEX: 32.73 KG/M2 | SYSTOLIC BLOOD PRESSURE: 100 MMHG | DIASTOLIC BLOOD PRESSURE: 60 MMHG | OXYGEN SATURATION: 95 % | WEIGHT: 233.8 LBS | TEMPERATURE: 97.7 F

## 2018-01-01 VITALS
HEART RATE: 75 BPM | BODY MASS INDEX: 28.49 KG/M2 | RESPIRATION RATE: 18 BRPM | OXYGEN SATURATION: 95 % | DIASTOLIC BLOOD PRESSURE: 62 MMHG | WEIGHT: 215 LBS | HEIGHT: 73 IN | TEMPERATURE: 98.3 F | SYSTOLIC BLOOD PRESSURE: 121 MMHG

## 2018-01-01 VITALS
TEMPERATURE: 97.9 F | WEIGHT: 230 LBS | DIASTOLIC BLOOD PRESSURE: 59 MMHG | RESPIRATION RATE: 16 BRPM | BODY MASS INDEX: 30.48 KG/M2 | HEIGHT: 73 IN | HEART RATE: 71 BPM | SYSTOLIC BLOOD PRESSURE: 103 MMHG | OXYGEN SATURATION: 98 %

## 2018-01-01 VITALS
SYSTOLIC BLOOD PRESSURE: 110 MMHG | WEIGHT: 224 LBS | DIASTOLIC BLOOD PRESSURE: 71 MMHG | RESPIRATION RATE: 16 BRPM | HEART RATE: 80 BPM | OXYGEN SATURATION: 94 % | HEIGHT: 73 IN | BODY MASS INDEX: 29.69 KG/M2 | TEMPERATURE: 97.7 F

## 2018-01-01 VITALS
TEMPERATURE: 98.1 F | RESPIRATION RATE: 18 BRPM | SYSTOLIC BLOOD PRESSURE: 95 MMHG | WEIGHT: 220.68 LBS | HEIGHT: 73 IN | OXYGEN SATURATION: 92 % | HEART RATE: 78 BPM | DIASTOLIC BLOOD PRESSURE: 61 MMHG | BODY MASS INDEX: 29.25 KG/M2

## 2018-01-01 VITALS
RESPIRATION RATE: 18 BRPM | WEIGHT: 230 LBS | SYSTOLIC BLOOD PRESSURE: 110 MMHG | OXYGEN SATURATION: 95 % | HEART RATE: 82 BPM | HEIGHT: 72 IN | BODY MASS INDEX: 31.15 KG/M2 | DIASTOLIC BLOOD PRESSURE: 76 MMHG | TEMPERATURE: 97.9 F

## 2018-01-01 VITALS
HEART RATE: 75 BPM | DIASTOLIC BLOOD PRESSURE: 32 MMHG | TEMPERATURE: 98.6 F | OXYGEN SATURATION: 89 % | RESPIRATION RATE: 26 BRPM | HEIGHT: 73 IN | BODY MASS INDEX: 26.65 KG/M2 | SYSTOLIC BLOOD PRESSURE: 121 MMHG | WEIGHT: 201.06 LBS

## 2018-01-01 VITALS
HEART RATE: 75 BPM | HEIGHT: 73 IN | SYSTOLIC BLOOD PRESSURE: 110 MMHG | BODY MASS INDEX: 28.89 KG/M2 | RESPIRATION RATE: 18 BRPM | DIASTOLIC BLOOD PRESSURE: 58 MMHG | TEMPERATURE: 98.2 F | OXYGEN SATURATION: 97 % | WEIGHT: 218 LBS

## 2018-01-01 VITALS
RESPIRATION RATE: 12 BRPM | OXYGEN SATURATION: 98 % | DIASTOLIC BLOOD PRESSURE: 57 MMHG | SYSTOLIC BLOOD PRESSURE: 111 MMHG

## 2018-01-01 VITALS
WEIGHT: 220 LBS | BODY MASS INDEX: 29.84 KG/M2 | DIASTOLIC BLOOD PRESSURE: 79 MMHG | SYSTOLIC BLOOD PRESSURE: 107 MMHG | HEART RATE: 75 BPM | TEMPERATURE: 97.4 F | RESPIRATION RATE: 18 BRPM | OXYGEN SATURATION: 93 %

## 2018-01-01 VITALS
SYSTOLIC BLOOD PRESSURE: 93 MMHG | TEMPERATURE: 98.6 F | WEIGHT: 225 LBS | HEIGHT: 72 IN | OXYGEN SATURATION: 99 % | HEART RATE: 76 BPM | DIASTOLIC BLOOD PRESSURE: 75 MMHG | BODY MASS INDEX: 30.48 KG/M2 | RESPIRATION RATE: 18 BRPM

## 2018-01-01 VITALS — OXYGEN SATURATION: 97 % | SYSTOLIC BLOOD PRESSURE: 90 MMHG | DIASTOLIC BLOOD PRESSURE: 51 MMHG

## 2018-01-01 DIAGNOSIS — M25.551 RIGHT HIP PAIN: ICD-10-CM

## 2018-01-01 DIAGNOSIS — Q44.1 GALLBLADDER ANOMALY: ICD-10-CM

## 2018-01-01 DIAGNOSIS — K22.2 ESOPHAGEAL OBSTRUCTION DUE TO FOOD IMPACTION: Primary | ICD-10-CM
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
## 2018-01-01 DIAGNOSIS — R06.02 SOB (SHORTNESS OF BREATH): ICD-10-CM

## 2018-01-01 DIAGNOSIS — T18.128A ESOPHAGEAL OBSTRUCTION DUE TO FOOD IMPACTION: Primary | ICD-10-CM

## 2018-01-01 DIAGNOSIS — S01.01XA LACERATION OF SCALP, INITIAL ENCOUNTER: ICD-10-CM

## 2018-01-01 DIAGNOSIS — J44.1 COPD WITH ACUTE EXACERBATION (HCC): Primary | ICD-10-CM

## 2018-01-01 DIAGNOSIS — M46.20 VERTEBRAL OSTEOMYELITIS (HCC): ICD-10-CM

## 2018-01-01 DIAGNOSIS — S09.90XA INJURY OF HEAD, INITIAL ENCOUNTER: ICD-10-CM

## 2018-01-01 DIAGNOSIS — M62.838 NECK MUSCLE SPASM: Primary | ICD-10-CM

## 2018-01-01 DIAGNOSIS — J44.9 COPD, MILD (HCC): Primary | ICD-10-CM

## 2018-01-01 DIAGNOSIS — G89.29 CHRONIC NECK PAIN: ICD-10-CM

## 2018-01-01 DIAGNOSIS — R77.8 ELEVATED TROPONIN: ICD-10-CM

## 2018-01-01 DIAGNOSIS — J18.9 PNEUMONIA DUE TO ORGANISM: ICD-10-CM

## 2018-01-01 DIAGNOSIS — F41.1 ANXIETY STATE: Primary | ICD-10-CM

## 2018-01-01 DIAGNOSIS — J18.9 PNEUMONIA OF RIGHT LOWER LOBE DUE TO INFECTIOUS ORGANISM: ICD-10-CM

## 2018-01-01 DIAGNOSIS — M54.2 CHRONIC NECK PAIN: ICD-10-CM

## 2018-01-01 DIAGNOSIS — S09.90XA CLOSED HEAD INJURY, INITIAL ENCOUNTER: ICD-10-CM

## 2018-01-01 DIAGNOSIS — K56.41 FECAL IMPACTION (HCC): ICD-10-CM

## 2018-01-01 DIAGNOSIS — F41.9 ANXIETY: ICD-10-CM

## 2018-01-01 DIAGNOSIS — F41.9 ANXIETY DISORDER, UNSPECIFIED TYPE: ICD-10-CM

## 2018-01-01 DIAGNOSIS — N17.9 ACUTE RENAL FAILURE, UNSPECIFIED ACUTE RENAL FAILURE TYPE (HCC): ICD-10-CM

## 2018-01-01 DIAGNOSIS — I61.4 CEREBELLAR HEMORRHAGE, ACUTE (HCC): ICD-10-CM

## 2018-01-01 DIAGNOSIS — I63.9 CEREBROVASCULAR ACCIDENT (CVA), UNSPECIFIED MECHANISM (HCC): Primary | ICD-10-CM

## 2018-01-01 DIAGNOSIS — J01.81 OTHER ACUTE RECURRENT SINUSITIS: Primary | ICD-10-CM

## 2018-01-01 DIAGNOSIS — R04.2 HEMOPTYSIS: ICD-10-CM

## 2018-01-01 DIAGNOSIS — M54.50 ACUTE RIGHT-SIDED LOW BACK PAIN WITHOUT SCIATICA: ICD-10-CM

## 2018-01-01 DIAGNOSIS — A41.9 SEPTICEMIA (HCC): Primary | ICD-10-CM

## 2018-01-01 DIAGNOSIS — K29.00 ACUTE GASTRITIS WITHOUT HEMORRHAGE, UNSPECIFIED GASTRITIS TYPE: Primary | ICD-10-CM

## 2018-01-01 LAB
ALBUMIN SERPL-MCNC: 2 G/DL (ref 3.9–4.9)
ALBUMIN SERPL-MCNC: 2.3 G/DL (ref 3.9–4.9)
ALBUMIN SERPL-MCNC: 2.5 G/DL (ref 3.9–4.9)
ALBUMIN SERPL-MCNC: 2.5 G/DL (ref 3.9–4.9)
ALBUMIN SERPL-MCNC: 2.6 G/DL (ref 3.9–4.9)
ALBUMIN SERPL-MCNC: 2.7 G/DL (ref 3.9–4.9)
ALBUMIN SERPL-MCNC: 3 G/DL (ref 3.9–4.9)
ALBUMIN SERPL-MCNC: 3.1 G/DL (ref 3.9–4.9)
ALBUMIN SERPL-MCNC: 3.2 G/DL (ref 3.9–4.9)
ALBUMIN SERPL-MCNC: 3.3 G/DL (ref 3.9–4.9)
ALBUMIN SERPL-MCNC: 3.4 G/DL (ref 3.9–4.9)
ALBUMIN SERPL-MCNC: 3.6 G/DL (ref 3.9–4.9)
ALBUMIN SERPL-MCNC: 3.7 G/DL (ref 3.9–4.9)
ALBUMIN SERPL-MCNC: 3.9 G/DL (ref 3.9–4.9)
ALBUMIN SERPL-MCNC: 3.9 G/DL (ref 3.9–4.9)
ALBUMIN SERPL-MCNC: 4.1 G/DL (ref 3.9–4.9)
ALBUMIN SERPL-MCNC: 4.4 G/DL (ref 3.9–4.9)
ALP BLD-CCNC: 198 U/L (ref 35–104)
ALP BLD-CCNC: 228 U/L (ref 35–104)
ALP BLD-CCNC: 244 U/L (ref 35–104)
ALP BLD-CCNC: 266 U/L (ref 35–104)
ALP BLD-CCNC: 44 U/L (ref 35–104)
ALP BLD-CCNC: 45 U/L (ref 35–104)
ALP BLD-CCNC: 47 U/L (ref 35–104)
ALP BLD-CCNC: 47 U/L (ref 35–104)
ALP BLD-CCNC: 56 U/L (ref 35–104)
ALP BLD-CCNC: 57 U/L (ref 35–104)
ALP BLD-CCNC: 63 U/L (ref 35–104)
ALP BLD-CCNC: 68 U/L (ref 35–104)
ALP BLD-CCNC: 70 U/L (ref 35–104)
ALP BLD-CCNC: 73 U/L (ref 35–104)
ALP BLD-CCNC: 74 U/L (ref 35–104)
ALP BLD-CCNC: 79 U/L (ref 35–104)
ALP BLD-CCNC: 85 U/L (ref 35–104)
ALT SERPL-CCNC: 10 U/L (ref 0–41)
ALT SERPL-CCNC: 11 U/L (ref 0–41)
ALT SERPL-CCNC: 11 U/L (ref 0–41)
ALT SERPL-CCNC: 13 U/L (ref 0–41)
ALT SERPL-CCNC: 13 U/L (ref 0–41)
ALT SERPL-CCNC: 14 U/L (ref 0–41)
ALT SERPL-CCNC: 18 U/L (ref 0–41)
ALT SERPL-CCNC: 20 U/L (ref 0–41)
ALT SERPL-CCNC: 24 U/L (ref 0–41)
ALT SERPL-CCNC: 7 U/L (ref 0–41)
ALT SERPL-CCNC: 8 U/L (ref 0–41)
ALT SERPL-CCNC: <5 U/L (ref 0–41)
AMPHETAMINE SCREEN, URINE: NORMAL
ANION GAP SERPL CALCULATED.3IONS-SCNC: 10 MEQ/L (ref 7–13)
ANION GAP SERPL CALCULATED.3IONS-SCNC: 10 MEQ/L (ref 7–13)
ANION GAP SERPL CALCULATED.3IONS-SCNC: 11 MEQ/L (ref 7–13)
ANION GAP SERPL CALCULATED.3IONS-SCNC: 12 MEQ/L (ref 7–13)
ANION GAP SERPL CALCULATED.3IONS-SCNC: 12 MEQ/L (ref 7–13)
ANION GAP SERPL CALCULATED.3IONS-SCNC: 13 MEQ/L (ref 7–13)
ANION GAP SERPL CALCULATED.3IONS-SCNC: 15 MEQ/L (ref 7–13)
ANION GAP SERPL CALCULATED.3IONS-SCNC: 18 MEQ/L (ref 7–13)
ANION GAP SERPL CALCULATED.3IONS-SCNC: 18 MEQ/L (ref 7–13)
ANION GAP SERPL CALCULATED.3IONS-SCNC: 19 MEQ/L (ref 7–13)
ANION GAP SERPL CALCULATED.3IONS-SCNC: 28 MEQ/L (ref 7–13)
ANION GAP SERPL CALCULATED.3IONS-SCNC: 7 MEQ/L (ref 7–13)
ANION GAP SERPL CALCULATED.3IONS-SCNC: 9 MEQ/L (ref 7–13)
ANISOCYTOSIS: ABNORMAL
APTT: 20.8 SEC (ref 21.6–35.4)
APTT: 26.4 SEC (ref 21.6–35.4)
AST SERPL-CCNC: 110 U/L (ref 0–40)
AST SERPL-CCNC: 17 U/L (ref 0–40)
AST SERPL-CCNC: 17 U/L (ref 0–40)
AST SERPL-CCNC: 18 U/L (ref 0–40)
AST SERPL-CCNC: 21 U/L (ref 0–40)
AST SERPL-CCNC: 22 U/L (ref 0–40)
AST SERPL-CCNC: 25 U/L (ref 0–40)
AST SERPL-CCNC: 25 U/L (ref 0–40)
AST SERPL-CCNC: 26 U/L (ref 0–40)
AST SERPL-CCNC: 27 U/L (ref 0–40)
AST SERPL-CCNC: 29 U/L (ref 0–40)
AST SERPL-CCNC: 31 U/L (ref 0–40)
AST SERPL-CCNC: 34 U/L (ref 0–40)
AST SERPL-CCNC: 34 U/L (ref 0–40)
AST SERPL-CCNC: 35 U/L (ref 0–40)
AST SERPL-CCNC: 46 U/L (ref 0–40)
AST SERPL-CCNC: 73 U/L (ref 0–40)
ATYPICAL LYMPHOCYTE RELATIVE PERCENT: 1 %
BANDED NEUTROPHILS RELATIVE PERCENT: 1 %
BARBITURATE SCREEN URINE: NORMAL
BASE EXCESS ARTERIAL: -2 (ref -3–3)
BASE EXCESS ARTERIAL: -6 (ref -3–3)
BASE EXCESS ARTERIAL: 13 (ref -3–3)
BASE EXCESS ARTERIAL: 14 (ref -3–3)
BASE EXCESS ARTERIAL: 6 (ref -3–3)
BASOPHILS ABSOLUTE: 0 K/UL (ref 0–0.2)
BASOPHILS RELATIVE PERCENT: 0 %
BASOPHILS RELATIVE PERCENT: 0 %
BASOPHILS RELATIVE PERCENT: 0.1 %
BASOPHILS RELATIVE PERCENT: 0.2 %
BASOPHILS RELATIVE PERCENT: 0.3 %
BASOPHILS RELATIVE PERCENT: 0.4 %
BASOPHILS RELATIVE PERCENT: 0.4 %
BASOPHILS RELATIVE PERCENT: 0.5 %
BASOPHILS RELATIVE PERCENT: 1 %
BASOPHILS RELATIVE PERCENT: 1.1 %
BENZODIAZEPINE SCREEN, URINE: NORMAL
BILIRUB SERPL-MCNC: 0.9 MG/DL (ref 0–1.2)
BILIRUB SERPL-MCNC: 1 MG/DL (ref 0–1.2)
BILIRUB SERPL-MCNC: 1 MG/DL (ref 0–1.2)
BILIRUB SERPL-MCNC: 1.3 MG/DL (ref 0–1.2)
BILIRUB SERPL-MCNC: 1.7 MG/DL (ref 0–1.2)
BILIRUB SERPL-MCNC: 1.7 MG/DL (ref 0–1.2)
BILIRUB SERPL-MCNC: 2.4 MG/DL (ref 0–1.2)
BILIRUB SERPL-MCNC: 2.5 MG/DL (ref 0–1.2)
BILIRUB SERPL-MCNC: 2.5 MG/DL (ref 0–1.2)
BILIRUB SERPL-MCNC: 2.7 MG/DL (ref 0–1.2)
BILIRUB SERPL-MCNC: 2.7 MG/DL (ref 0–1.2)
BILIRUB SERPL-MCNC: 2.9 MG/DL (ref 0–1.2)
BILIRUB SERPL-MCNC: 3.8 MG/DL (ref 0–1.2)
BILIRUB SERPL-MCNC: 3.9 MG/DL (ref 0–1.2)
BILIRUB SERPL-MCNC: 4 MG/DL (ref 0–1.2)
BILIRUB SERPL-MCNC: 4.1 MG/DL (ref 0–1.2)
BILIRUB SERPL-MCNC: 4.2 MG/DL (ref 0–1.2)
BILIRUBIN DIRECT: 0.4 MG/DL (ref 0–0.3)
BILIRUBIN DIRECT: 0.8 MG/DL (ref 0–0.3)
BILIRUBIN DIRECT: 0.9 MG/DL (ref 0–0.3)
BILIRUBIN DIRECT: 0.9 MG/DL (ref 0–0.3)
BILIRUBIN DIRECT: 1 MG/DL (ref 0–0.3)
BILIRUBIN DIRECT: 1.1 MG/DL (ref 0–0.3)
BILIRUBIN DIRECT: 1.2 MG/DL (ref 0–0.3)
BILIRUBIN, INDIRECT: 0.8 MG/DL (ref 0–0.6)
BILIRUBIN, INDIRECT: 1.6 MG/DL (ref 0–0.6)
BILIRUBIN, INDIRECT: 2 MG/DL (ref 0–0.6)
BILIRUBIN, INDIRECT: 2.3 MG/DL (ref 0–0.6)
BILIRUBIN, INDIRECT: 2.3 MG/DL (ref 0–0.6)
BILIRUBIN, INDIRECT: 2.8 MG/DL (ref 0–0.6)
BILIRUBIN, INDIRECT: 3 MG/DL (ref 0–0.6)
BILIRUBIN, INDIRECT: 3.1 MG/DL (ref 0–0.6)
BILIRUBIN, INDIRECT: 3.2 MG/DL (ref 0–0.6)
BLOOD CULTURE, ROUTINE: NORMAL
BUN BLDV-MCNC: 103 MG/DL (ref 8–23)
BUN BLDV-MCNC: 106 MG/DL (ref 8–23)
BUN BLDV-MCNC: 111 MG/DL (ref 8–23)
BUN BLDV-MCNC: 19 MG/DL (ref 8–23)
BUN BLDV-MCNC: 19 MG/DL (ref 8–23)
BUN BLDV-MCNC: 23 MG/DL (ref 8–23)
BUN BLDV-MCNC: 26 MG/DL (ref 8–23)
BUN BLDV-MCNC: 30 MG/DL (ref 8–23)
BUN BLDV-MCNC: 31 MG/DL (ref 8–23)
BUN BLDV-MCNC: 32 MG/DL (ref 8–23)
BUN BLDV-MCNC: 34 MG/DL (ref 8–23)
BUN BLDV-MCNC: 34 MG/DL (ref 8–23)
BUN BLDV-MCNC: 35 MG/DL (ref 8–23)
BUN BLDV-MCNC: 37 MG/DL (ref 8–23)
BUN BLDV-MCNC: 41 MG/DL (ref 8–23)
BUN BLDV-MCNC: 76 MG/DL (ref 8–23)
BUN BLDV-MCNC: 94 MG/DL (ref 8–23)
BURR CELLS: ABNORMAL
BURR CELLS: ABNORMAL
C-REACTIVE PROTEIN, HIGH SENSITIVITY: 65 MG/L (ref 0–5)
CALCIUM IONIZED: 1.03 MMOL/L (ref 1.12–1.32)
CALCIUM IONIZED: 1.06 MMOL/L (ref 1.12–1.32)
CALCIUM IONIZED: 1.13 MMOL/L (ref 1.12–1.32)
CALCIUM IONIZED: 1.21 MMOL/L (ref 1.12–1.32)
CALCIUM SERPL-MCNC: 7.5 MG/DL (ref 8.6–10.2)
CALCIUM SERPL-MCNC: 7.7 MG/DL (ref 8.6–10.2)
CALCIUM SERPL-MCNC: 7.7 MG/DL (ref 8.6–10.2)
CALCIUM SERPL-MCNC: 8 MG/DL (ref 8.6–10.2)
CALCIUM SERPL-MCNC: 8 MG/DL (ref 8.6–10.2)
CALCIUM SERPL-MCNC: 8.1 MG/DL (ref 8.6–10.2)
CALCIUM SERPL-MCNC: 8.2 MG/DL (ref 8.6–10.2)
CALCIUM SERPL-MCNC: 8.3 MG/DL (ref 8.6–10.2)
CALCIUM SERPL-MCNC: 8.3 MG/DL (ref 8.6–10.2)
CALCIUM SERPL-MCNC: 8.6 MG/DL (ref 8.6–10.2)
CALCIUM SERPL-MCNC: 8.7 MG/DL (ref 8.6–10.2)
CALCIUM SERPL-MCNC: 9 MG/DL (ref 8.6–10.2)
CALCIUM SERPL-MCNC: 9.2 MG/DL (ref 8.6–10.2)
CALCIUM SERPL-MCNC: 9.7 MG/DL (ref 8.6–10.2)
CANNABINOID SCREEN URINE: NORMAL
CHLORIDE BLD-SCNC: 100 MEQ/L (ref 98–107)
CHLORIDE BLD-SCNC: 101 MEQ/L (ref 98–107)
CHLORIDE BLD-SCNC: 102 MEQ/L (ref 98–107)
CHLORIDE BLD-SCNC: 103 MEQ/L (ref 98–107)
CHLORIDE BLD-SCNC: 103 MEQ/L (ref 98–107)
CHLORIDE BLD-SCNC: 104 MEQ/L (ref 98–107)
CHLORIDE BLD-SCNC: 105 MEQ/L (ref 98–107)
CHLORIDE BLD-SCNC: 94 MEQ/L (ref 98–107)
CHLORIDE BLD-SCNC: 97 MEQ/L (ref 98–107)
CHLORIDE BLD-SCNC: 98 MEQ/L (ref 98–107)
CHLORIDE BLD-SCNC: 99 MEQ/L (ref 98–107)
CK MB: 3.9 NG/ML (ref 0–6.7)
CO2: 17 MEQ/L (ref 22–29)
CO2: 18 MEQ/L (ref 22–29)
CO2: 19 MEQ/L (ref 22–29)
CO2: 22 MEQ/L (ref 22–29)
CO2: 24 MEQ/L (ref 22–29)
CO2: 25 MEQ/L (ref 22–29)
CO2: 27 MEQ/L (ref 22–29)
CO2: 29 MEQ/L (ref 22–29)
CO2: 29 MEQ/L (ref 22–29)
CO2: 30 MEQ/L (ref 22–29)
CO2: 31 MEQ/L (ref 22–29)
CO2: 32 MEQ/L (ref 22–29)
CO2: 32 MEQ/L (ref 22–29)
CO2: 33 MEQ/L (ref 22–29)
CO2: 35 MEQ/L (ref 22–29)
COCAINE METABOLITE SCREEN URINE: NORMAL
CREAT SERPL-MCNC: 0.65 MG/DL (ref 0.7–1.2)
CREAT SERPL-MCNC: 0.7 MG/DL (ref 0.7–1.2)
CREAT SERPL-MCNC: 0.73 MG/DL (ref 0.7–1.2)
CREAT SERPL-MCNC: 0.77 MG/DL (ref 0.7–1.2)
CREAT SERPL-MCNC: 0.82 MG/DL (ref 0.7–1.2)
CREAT SERPL-MCNC: 0.84 MG/DL (ref 0.7–1.2)
CREAT SERPL-MCNC: 0.87 MG/DL (ref 0.7–1.2)
CREAT SERPL-MCNC: 0.88 MG/DL (ref 0.7–1.2)
CREAT SERPL-MCNC: 0.98 MG/DL (ref 0.7–1.2)
CREAT SERPL-MCNC: 1.02 MG/DL (ref 0.7–1.2)
CREAT SERPL-MCNC: 1.09 MG/DL (ref 0.7–1.2)
CREAT SERPL-MCNC: 1.1 MG/DL (ref 0.7–1.2)
CREAT SERPL-MCNC: 1.23 MG/DL (ref 0.7–1.2)
CREAT SERPL-MCNC: 1.3 MG/DL (ref 0.7–1.2)
CREAT SERPL-MCNC: 2.39 MG/DL (ref 0.7–1.2)
CREAT SERPL-MCNC: 3.26 MG/DL (ref 0.7–1.2)
CREAT SERPL-MCNC: 4.07 MG/DL (ref 0.7–1.2)
CREAT SERPL-MCNC: 4.54 MG/DL (ref 0.7–1.2)
CREAT SERPL-MCNC: 4.75 MG/DL (ref 0.7–1.2)
CREATINE KINASE-MB INDEX: 1.6 % (ref 0–3.5)
CULTURE, BLOOD 2: NORMAL
CULTURE, RESPIRATORY: ABNORMAL
CULTURE, RESPIRATORY: ABNORMAL
DIGOXIN LEVEL: 0.7 NG/ML (ref 0.8–2)
EKG ATRIAL RATE: 101 BPM
EKG ATRIAL RATE: 125 BPM
EKG ATRIAL RATE: 47 BPM
EKG ATRIAL RATE: 55 BPM
EKG ATRIAL RATE: 74 BPM
EKG Q-T INTERVAL: 472 MS
EKG Q-T INTERVAL: 472 MS
EKG Q-T INTERVAL: 510 MS
EKG Q-T INTERVAL: 522 MS
EKG Q-T INTERVAL: 528 MS
EKG QRS DURATION: 140 MS
EKG QRS DURATION: 166 MS
EKG QRS DURATION: 180 MS
EKG QRS DURATION: 184 MS
EKG QRS DURATION: 186 MS
EKG QTC CALCULATION (BAZETT): 516 MS
EKG QTC CALCULATION (BAZETT): 554 MS
EKG QTC CALCULATION (BAZETT): 587 MS
EKG QTC CALCULATION (BAZETT): 602 MS
EKG QTC CALCULATION (BAZETT): 674 MS
EKG R AXIS: -65 DEGREES
EKG R AXIS: -86 DEGREES
EKG R AXIS: -87 DEGREES
EKG R AXIS: -88 DEGREES
EKG R AXIS: 269 DEGREES
EKG T AXIS: 109 DEGREES
EKG T AXIS: 67 DEGREES
EKG T AXIS: 81 DEGREES
EKG T AXIS: 84 DEGREES
EKG T AXIS: 91 DEGREES
EKG VENTRICULAR RATE: 72 BPM
EKG VENTRICULAR RATE: 76 BPM
EKG VENTRICULAR RATE: 83 BPM
EKG VENTRICULAR RATE: 84 BPM
EKG VENTRICULAR RATE: 98 BPM
EOSINOPHILS ABSOLUTE: 0 K/UL (ref 0–0.7)
EOSINOPHILS ABSOLUTE: 0.1 K/UL (ref 0–0.7)
EOSINOPHILS RELATIVE PERCENT: 0 %
EOSINOPHILS RELATIVE PERCENT: 0.5 %
EOSINOPHILS RELATIVE PERCENT: 0.7 %
EOSINOPHILS RELATIVE PERCENT: 1.8 %
EOSINOPHILS RELATIVE PERCENT: 1.8 %
EOSINOPHILS RELATIVE PERCENT: 1.9 %
ETHANOL PERCENT: NORMAL G/DL
ETHANOL: <10 MG/DL (ref 0–0.08)
GFR AFRICAN AMERICAN: 14.2
GFR AFRICAN AMERICAN: 14.9
GFR AFRICAN AMERICAN: 15
GFR AFRICAN AMERICAN: 17
GFR AFRICAN AMERICAN: 21.9
GFR AFRICAN AMERICAN: 31
GFR AFRICAN AMERICAN: 31.3
GFR AFRICAN AMERICAN: >60
GFR NON-AFRICAN AMERICAN: 11.7
GFR NON-AFRICAN AMERICAN: 12
GFR NON-AFRICAN AMERICAN: 12.4
GFR NON-AFRICAN AMERICAN: 14
GFR NON-AFRICAN AMERICAN: 18.1
GFR NON-AFRICAN AMERICAN: 25.9
GFR NON-AFRICAN AMERICAN: 26
GFR NON-AFRICAN AMERICAN: 52.3
GFR NON-AFRICAN AMERICAN: 55.8
GFR NON-AFRICAN AMERICAN: >60
GLOBULIN: 2.1 G/DL (ref 2.3–3.5)
GLOBULIN: 2.3 G/DL (ref 2.3–3.5)
GLOBULIN: 2.3 G/DL (ref 2.3–3.5)
GLOBULIN: 2.4 G/DL (ref 2.3–3.5)
GLOBULIN: 2.5 G/DL (ref 2.3–3.5)
GLOBULIN: 2.7 G/DL (ref 2.3–3.5)
GLOBULIN: 2.9 G/DL (ref 2.3–3.5)
GLOBULIN: 3.3 G/DL (ref 2.3–3.5)
GLUCOSE BLD-MCNC: 103 MG/DL (ref 60–115)
GLUCOSE BLD-MCNC: 103 MG/DL (ref 60–115)
GLUCOSE BLD-MCNC: 104 MG/DL (ref 74–109)
GLUCOSE BLD-MCNC: 105 MG/DL (ref 74–109)
GLUCOSE BLD-MCNC: 105 MG/DL (ref 74–109)
GLUCOSE BLD-MCNC: 106 MG/DL (ref 74–109)
GLUCOSE BLD-MCNC: 111 MG/DL (ref 60–115)
GLUCOSE BLD-MCNC: 111 MG/DL (ref 74–109)
GLUCOSE BLD-MCNC: 113 MG/DL (ref 74–109)
GLUCOSE BLD-MCNC: 113 MG/DL (ref 74–109)
GLUCOSE BLD-MCNC: 115 MG/DL (ref 74–109)
GLUCOSE BLD-MCNC: 115 MG/DL (ref 74–109)
GLUCOSE BLD-MCNC: 120 MG/DL (ref 74–109)
GLUCOSE BLD-MCNC: 120 MG/DL (ref 74–109)
GLUCOSE BLD-MCNC: 124 MG/DL (ref 60–115)
GLUCOSE BLD-MCNC: 124 MG/DL (ref 74–109)
GLUCOSE BLD-MCNC: 131 MG/DL (ref 74–109)
GLUCOSE BLD-MCNC: 132 MG/DL (ref 60–115)
GLUCOSE BLD-MCNC: 139 MG/DL (ref 60–115)
GLUCOSE BLD-MCNC: 154 MG/DL (ref 60–115)
GLUCOSE BLD-MCNC: 166 MG/DL (ref 60–115)
GLUCOSE BLD-MCNC: 166 MG/DL (ref 60–115)
GLUCOSE BLD-MCNC: 177 MG/DL (ref 74–109)
GLUCOSE BLD-MCNC: 86 MG/DL (ref 74–109)
GLUCOSE BLD-MCNC: 88 MG/DL (ref 60–115)
GLUCOSE BLD-MCNC: 92 MG/DL (ref 74–109)
GLUCOSE BLD-MCNC: 94 MG/DL (ref 74–109)
GLUCOSE BLD-MCNC: 95 MG/DL (ref 74–109)
GLUCOSE BLD-MCNC: 96 MG/DL (ref 74–109)
GLUCOSE BLD-MCNC: 99 MG/DL (ref 60–115)
GRAM STAIN RESULT: ABNORMAL
HCO3 ARTERIAL: 18.3 MMOL/L (ref 21–29)
HCO3 ARTERIAL: 22.3 MMOL/L (ref 21–29)
HCO3 ARTERIAL: 31.6 MMOL/L (ref 21–29)
HCO3 ARTERIAL: 36.1 MMOL/L (ref 21–29)
HCO3 ARTERIAL: 37.8 MMOL/L (ref 21–29)
HCT VFR BLD CALC: 29.4 % (ref 42–52)
HCT VFR BLD CALC: 32.3 % (ref 42–52)
HCT VFR BLD CALC: 33.8 % (ref 42–52)
HCT VFR BLD CALC: 33.9 % (ref 42–52)
HCT VFR BLD CALC: 34 % (ref 42–52)
HCT VFR BLD CALC: 34.1 % (ref 42–52)
HCT VFR BLD CALC: 34.3 % (ref 42–52)
HCT VFR BLD CALC: 34.5 % (ref 42–52)
HCT VFR BLD CALC: 36.5 % (ref 42–52)
HCT VFR BLD CALC: 38.9 % (ref 42–52)
HCT VFR BLD CALC: 38.9 % (ref 42–52)
HCT VFR BLD CALC: 39 % (ref 42–52)
HCT VFR BLD CALC: 40.6 % (ref 42–52)
HCT VFR BLD CALC: 40.7 % (ref 42–52)
HCT VFR BLD CALC: 41.1 % (ref 42–52)
HCT VFR BLD CALC: 41.2 % (ref 42–52)
HCT VFR BLD CALC: ABNORMAL % (ref 42–52)
HEMOGLOBIN: 10.1 G/DL (ref 14–18)
HEMOGLOBIN: 10.7 GM/DL (ref 13.5–17.5)
HEMOGLOBIN: 10.9 G/DL (ref 14–18)
HEMOGLOBIN: 11.4 G/DL (ref 14–18)
HEMOGLOBIN: 11.5 G/DL (ref 14–18)
HEMOGLOBIN: 11.5 G/DL (ref 14–18)
HEMOGLOBIN: 11.6 G/DL (ref 14–18)
HEMOGLOBIN: 11.6 G/DL (ref 14–18)
HEMOGLOBIN: 11.7 G/DL (ref 14–18)
HEMOGLOBIN: 12.1 G/DL (ref 14–18)
HEMOGLOBIN: 12.3 GM/DL (ref 13.5–17.5)
HEMOGLOBIN: 12.9 G/DL (ref 14–18)
HEMOGLOBIN: 13.1 G/DL (ref 14–18)
HEMOGLOBIN: 13.1 G/DL (ref 14–18)
HEMOGLOBIN: 13.4 G/DL (ref 14–18)
HEMOGLOBIN: 13.6 G/DL (ref 14–18)
HEMOGLOBIN: 13.6 G/DL (ref 14–18)
HEMOGLOBIN: 13.9 G/DL (ref 14–18)
HEMOGLOBIN: 9.9 GM/DL (ref 13.5–17.5)
HEMOGLOBIN: ABNORMAL G/DL (ref 14–18)
HYPOCHROMIA: 0
INR BLD: 1.2
INR BLD: 1.2
LACTATE: 0.59 MMOL/L (ref 0.4–2)
LACTATE: 0.62 MMOL/L (ref 0.4–2)
LACTATE: 1.16 MMOL/L (ref 0.4–2)
LACTATE: 1.95 MMOL/L (ref 0.4–2)
LACTATE: 3.46 MMOL/L (ref 0.4–2)
LACTIC ACID, SEPSIS: 2.2 MMOL/L (ref 0.5–1.9)
LACTIC ACID, SEPSIS: 4.3 MMOL/L (ref 0.5–1.9)
LACTIC ACID: 0.9 MMOL/L (ref 0.5–2.2)
LACTIC ACID: 1 MMOL/L (ref 0.5–2.2)
LACTIC ACID: 1.1 MMOL/L (ref 0.5–2.2)
LACTIC ACID: 1.4 MMOL/L (ref 0.5–2.2)
LACTIC ACID: 1.8 MMOL/L (ref 0.5–2.2)
LACTIC ACID: 5.3 MMOL/L (ref 0.5–2.2)
LIPASE: 21 U/L (ref 13–60)
LV EF: 20 %
LVEF MODALITY: NORMAL
LYMPHOCYTES ABSOLUTE: 0.2 K/UL (ref 1–4.8)
LYMPHOCYTES ABSOLUTE: 0.2 K/UL (ref 1–4.8)
LYMPHOCYTES ABSOLUTE: 0.3 K/UL (ref 1–4.8)
LYMPHOCYTES ABSOLUTE: 0.4 K/UL (ref 1–4.8)
LYMPHOCYTES ABSOLUTE: 0.6 K/UL (ref 1–4.8)
LYMPHOCYTES ABSOLUTE: 0.7 K/UL (ref 1–4.8)
LYMPHOCYTES ABSOLUTE: 0.9 K/UL (ref 1–4.8)
LYMPHOCYTES RELATIVE PERCENT: 0.9 %
LYMPHOCYTES RELATIVE PERCENT: 11.7 %
LYMPHOCYTES RELATIVE PERCENT: 15.5 %
LYMPHOCYTES RELATIVE PERCENT: 19 %
LYMPHOCYTES RELATIVE PERCENT: 3 %
LYMPHOCYTES RELATIVE PERCENT: 3.1 %
LYMPHOCYTES RELATIVE PERCENT: 3.3 %
LYMPHOCYTES RELATIVE PERCENT: 3.8 %
LYMPHOCYTES RELATIVE PERCENT: 4.4 %
LYMPHOCYTES RELATIVE PERCENT: 4.6 %
LYMPHOCYTES RELATIVE PERCENT: 4.8 %
LYMPHOCYTES RELATIVE PERCENT: 4.9 %
LYMPHOCYTES RELATIVE PERCENT: 5 %
LYMPHOCYTES RELATIVE PERCENT: 6.9 %
LYMPHOCYTES RELATIVE PERCENT: 8.7 %
Lab: NORMAL
MACROCYTES: 0
MAGNESIUM: 2 MG/DL (ref 1.7–2.3)
MAGNESIUM: 2.2 MG/DL (ref 1.7–2.3)
MAGNESIUM: 2.3 MG/DL (ref 1.7–2.3)
MAGNESIUM: 2.4 MG/DL (ref 1.7–2.3)
MCH RBC QN AUTO: 30 PG (ref 27–31.3)
MCH RBC QN AUTO: 30.2 PG (ref 27–31.3)
MCH RBC QN AUTO: 30.4 PG (ref 27–31.3)
MCH RBC QN AUTO: 30.6 PG (ref 27–31.3)
MCH RBC QN AUTO: 30.7 PG (ref 27–31.3)
MCH RBC QN AUTO: 30.8 PG (ref 27–31.3)
MCH RBC QN AUTO: 31 PG (ref 27–31.3)
MCH RBC QN AUTO: 31.1 PG (ref 27–31.3)
MCH RBC QN AUTO: 31.1 PG (ref 27–31.3)
MCH RBC QN AUTO: 31.4 PG (ref 27–31.3)
MCH RBC QN AUTO: 31.4 PG (ref 27–31.3)
MCH RBC QN AUTO: ABNORMAL PG (ref 27–31.3)
MCHC RBC AUTO-ENTMCNC: 32.9 % (ref 33–37)
MCHC RBC AUTO-ENTMCNC: 33.1 % (ref 33–37)
MCHC RBC AUTO-ENTMCNC: 33.2 % (ref 33–37)
MCHC RBC AUTO-ENTMCNC: 33.3 % (ref 33–37)
MCHC RBC AUTO-ENTMCNC: 33.5 % (ref 33–37)
MCHC RBC AUTO-ENTMCNC: 33.5 % (ref 33–37)
MCHC RBC AUTO-ENTMCNC: 33.7 % (ref 33–37)
MCHC RBC AUTO-ENTMCNC: 33.7 % (ref 33–37)
MCHC RBC AUTO-ENTMCNC: 33.8 % (ref 33–37)
MCHC RBC AUTO-ENTMCNC: 34 % (ref 33–37)
MCHC RBC AUTO-ENTMCNC: 34 % (ref 33–37)
MCHC RBC AUTO-ENTMCNC: 34.1 % (ref 33–37)
MCHC RBC AUTO-ENTMCNC: 34.1 % (ref 33–37)
MCHC RBC AUTO-ENTMCNC: 34.4 % (ref 33–37)
MCHC RBC AUTO-ENTMCNC: ABNORMAL % (ref 33–37)
MCV RBC AUTO: 89.8 FL (ref 80–100)
MCV RBC AUTO: 90.1 FL (ref 80–100)
MCV RBC AUTO: 90.2 FL (ref 80–100)
MCV RBC AUTO: 90.4 FL (ref 80–100)
MCV RBC AUTO: 90.6 FL (ref 80–100)
MCV RBC AUTO: 90.9 FL (ref 80–100)
MCV RBC AUTO: 90.9 FL (ref 80–100)
MCV RBC AUTO: 91.2 FL (ref 80–100)
MCV RBC AUTO: 91.2 FL (ref 80–100)
MCV RBC AUTO: 91.4 FL (ref 80–100)
MCV RBC AUTO: 91.5 FL (ref 80–100)
MCV RBC AUTO: 91.8 FL (ref 80–100)
MCV RBC AUTO: 92 FL (ref 80–100)
MCV RBC AUTO: 92.9 FL (ref 80–100)
MCV RBC AUTO: 93.2 FL (ref 80–100)
MCV RBC AUTO: 93.6 FL (ref 80–100)
MCV RBC AUTO: ABNORMAL FL (ref 80–100)
MICROCYTES: 0
MONOCYTES ABSOLUTE: 0.3 K/UL (ref 0.2–0.8)
MONOCYTES ABSOLUTE: 0.4 K/UL (ref 0.2–0.8)
MONOCYTES ABSOLUTE: 0.5 K/UL (ref 0.2–0.8)
MONOCYTES ABSOLUTE: 0.7 K/UL (ref 0.2–0.8)
MONOCYTES ABSOLUTE: 0.7 K/UL (ref 0.2–0.8)
MONOCYTES ABSOLUTE: 0.8 K/UL (ref 0.2–0.8)
MONOCYTES ABSOLUTE: 0.8 K/UL (ref 0.2–0.8)
MONOCYTES ABSOLUTE: 0.9 K/UL (ref 0.2–0.8)
MONOCYTES ABSOLUTE: 1 K/UL (ref 0.2–0.8)
MONOCYTES RELATIVE PERCENT: 10.7 %
MONOCYTES RELATIVE PERCENT: 3.2 %
MONOCYTES RELATIVE PERCENT: 3.8 %
MONOCYTES RELATIVE PERCENT: 4.4 %
MONOCYTES RELATIVE PERCENT: 4.8 %
MONOCYTES RELATIVE PERCENT: 5.7 %
MONOCYTES RELATIVE PERCENT: 6.3 %
MONOCYTES RELATIVE PERCENT: 6.6 %
MONOCYTES RELATIVE PERCENT: 7.4 %
MONOCYTES RELATIVE PERCENT: 8.6 %
MONOCYTES RELATIVE PERCENT: 8.7 %
MONOCYTES RELATIVE PERCENT: 8.9 %
MONOCYTES RELATIVE PERCENT: 8.9 %
MONOCYTES RELATIVE PERCENT: 9.4 %
MONOCYTES RELATIVE PERCENT: 9.5 %
NEUTROPHILS ABSOLUTE: 12.1 K/UL (ref 1.4–6.5)
NEUTROPHILS ABSOLUTE: 13 K/UL (ref 1.4–6.5)
NEUTROPHILS ABSOLUTE: 2.9 K/UL (ref 1.4–6.5)
NEUTROPHILS ABSOLUTE: 23.8 K/UL (ref 1.4–6.5)
NEUTROPHILS ABSOLUTE: 3.3 K/UL (ref 1.4–6.5)
NEUTROPHILS ABSOLUTE: 4.3 K/UL (ref 1.4–6.5)
NEUTROPHILS ABSOLUTE: 4.6 K/UL (ref 1.4–6.5)
NEUTROPHILS ABSOLUTE: 5.3 K/UL (ref 1.4–6.5)
NEUTROPHILS ABSOLUTE: 5.9 K/UL (ref 1.4–6.5)
NEUTROPHILS ABSOLUTE: 6.2 K/UL (ref 1.4–6.5)
NEUTROPHILS ABSOLUTE: 7 K/UL (ref 1.4–6.5)
NEUTROPHILS ABSOLUTE: 7.1 K/UL (ref 1.4–6.5)
NEUTROPHILS ABSOLUTE: 7.6 K/UL (ref 1.4–6.5)
NEUTROPHILS ABSOLUTE: 7.9 K/UL (ref 1.4–6.5)
NEUTROPHILS ABSOLUTE: 8.3 K/UL (ref 1.4–6.5)
NEUTROPHILS RELATIVE PERCENT: 67.4 %
NEUTROPHILS RELATIVE PERCENT: 74 %
NEUTROPHILS RELATIVE PERCENT: 78.5 %
NEUTROPHILS RELATIVE PERCENT: 81.7 %
NEUTROPHILS RELATIVE PERCENT: 83.3 %
NEUTROPHILS RELATIVE PERCENT: 85.8 %
NEUTROPHILS RELATIVE PERCENT: 86.6 %
NEUTROPHILS RELATIVE PERCENT: 87.4 %
NEUTROPHILS RELATIVE PERCENT: 87.5 %
NEUTROPHILS RELATIVE PERCENT: 88.1 %
NEUTROPHILS RELATIVE PERCENT: 90.3 %
NEUTROPHILS RELATIVE PERCENT: 91 %
NEUTROPHILS RELATIVE PERCENT: 91.8 %
NEUTROPHILS RELATIVE PERCENT: 93.4 %
NEUTROPHILS RELATIVE PERCENT: 94 %
O2 SAT, ARTERIAL: 100 % (ref 93–100)
O2 SAT, ARTERIAL: 80 % (ref 93–100)
O2 SAT, ARTERIAL: 92 % (ref 93–100)
O2 SAT, ARTERIAL: 93 % (ref 93–100)
O2 SAT, ARTERIAL: 98 % (ref 93–100)
OPIATE SCREEN URINE: NORMAL
ORGANISM: ABNORMAL
OVALOCYTES: ABNORMAL
OVALOCYTES: ABNORMAL
PCO2 ARTERIAL: 29 MM HG (ref 35–45)
PCO2 ARTERIAL: 32 MM HG (ref 35–45)
PCO2 ARTERIAL: 50 MM HG (ref 35–45)
PCO2 ARTERIAL: 55 MM HG (ref 35–45)
PCO2 ARTERIAL: 55 MM HG (ref 35–45)
PDW BLD-RTO: 13.7 % (ref 11.5–14.5)
PDW BLD-RTO: 13.8 % (ref 11.5–14.5)
PDW BLD-RTO: 13.9 % (ref 11.5–14.5)
PDW BLD-RTO: 14 % (ref 11.5–14.5)
PDW BLD-RTO: 14.1 % (ref 11.5–14.5)
PDW BLD-RTO: 14.2 % (ref 11.5–14.5)
PDW BLD-RTO: 14.3 % (ref 11.5–14.5)
PDW BLD-RTO: 14.4 % (ref 11.5–14.5)
PDW BLD-RTO: 14.6 % (ref 11.5–14.5)
PDW BLD-RTO: 16.4 % (ref 11.5–14.5)
PDW BLD-RTO: 16.7 % (ref 11.5–14.5)
PDW BLD-RTO: 16.9 % (ref 11.5–14.5)
PDW BLD-RTO: ABNORMAL % (ref 11.5–14.5)
PERFORMED ON: ABNORMAL
PERFORMED ON: NORMAL
PH ARTERIAL: 7.37 (ref 7.35–7.45)
PH ARTERIAL: 7.4 (ref 7.35–7.45)
PH ARTERIAL: 7.45 (ref 7.35–7.45)
PH ARTERIAL: 7.45 (ref 7.35–7.45)
PH ARTERIAL: 7.47 (ref 7.35–7.45)
PHENCYCLIDINE SCREEN URINE: NORMAL
PLATELET # BLD: 110 K/UL (ref 130–400)
PLATELET # BLD: 115 K/UL (ref 130–400)
PLATELET # BLD: 124 K/UL (ref 130–400)
PLATELET # BLD: 126 K/UL (ref 130–400)
PLATELET # BLD: 75 K/UL (ref 130–400)
PLATELET # BLD: 75 K/UL (ref 130–400)
PLATELET # BLD: 80 K/UL (ref 130–400)
PLATELET # BLD: 80 K/UL (ref 130–400)
PLATELET # BLD: 82 K/UL (ref 130–400)
PLATELET # BLD: 83 K/UL (ref 130–400)
PLATELET # BLD: 87 K/UL (ref 130–400)
PLATELET # BLD: 88 K/UL (ref 130–400)
PLATELET # BLD: 91 K/UL (ref 130–400)
PLATELET # BLD: 96 K/UL (ref 130–400)
PLATELET # BLD: 96 K/UL (ref 130–400)
PLATELET # BLD: ABNORMAL K/UL (ref 130–400)
PLATELET # BLD: ABNORMAL K/UL (ref 130–400)
PLATELET SLIDE REVIEW: ABNORMAL
PO2 ARTERIAL: 201 MM HG (ref 75–108)
PO2 ARTERIAL: 47 MM HG (ref 75–108)
PO2 ARTERIAL: 64 MM HG (ref 75–108)
PO2 ARTERIAL: 64 MM HG (ref 75–108)
PO2 ARTERIAL: 92 MM HG (ref 75–108)
POC CHLORIDE: 105 MEQ/L (ref 99–110)
POC CHLORIDE: 110 MEQ/L (ref 99–110)
POC CHLORIDE: 98 MEQ/L (ref 99–110)
POC CREATININE: 0.9 MG/DL (ref 0.8–1.3)
POC CREATININE: 1 MG/DL (ref 0.8–1.3)
POC CREATININE: 2.4 MG/DL (ref 0.8–1.3)
POC CREATININE: 4.5 MG/DL (ref 0.8–1.3)
POC FIO2: 100
POC FIO2: 50
POC FIO2: 50
POC FIO2: 59
POC FIO2: 60
POC HEMATOCRIT: 29 % (ref 41–53)
POC HEMATOCRIT: 31 % (ref 41–53)
POC HEMATOCRIT: 36 % (ref 41–53)
POC POTASSIUM: 3.2 MEQ/L (ref 3.5–5.1)
POC POTASSIUM: 3.8 MEQ/L (ref 3.5–5.1)
POC POTASSIUM: 5 MEQ/L (ref 3.5–5.1)
POC SAMPLE TYPE: ABNORMAL
POC SODIUM: 139 MEQ/L (ref 136–145)
POC SODIUM: 142 MEQ/L (ref 136–145)
POC SODIUM: 143 MEQ/L (ref 136–145)
POIKILOCYTES: ABNORMAL
POIKILOCYTES: ABNORMAL
POLYCHROMASIA: 0
POTASSIUM REFLEX MAGNESIUM: 2.9 MEQ/L (ref 3.5–5.1)
POTASSIUM REFLEX MAGNESIUM: 3.3 MEQ/L (ref 3.5–5.1)
POTASSIUM REFLEX MAGNESIUM: 3.5 MEQ/L (ref 3.5–5.1)
POTASSIUM REFLEX MAGNESIUM: 3.8 MEQ/L (ref 3.5–5.1)
POTASSIUM REFLEX MAGNESIUM: 3.9 MEQ/L (ref 3.5–5.1)
POTASSIUM REFLEX MAGNESIUM: 4.2 MEQ/L (ref 3.5–5.1)
POTASSIUM REFLEX MAGNESIUM: 4.3 MEQ/L (ref 3.5–5.1)
POTASSIUM REFLEX MAGNESIUM: 4.5 MEQ/L (ref 3.5–5.1)
POTASSIUM REFLEX MAGNESIUM: 5.7 MEQ/L (ref 3.5–5.1)
POTASSIUM REFLEX MAGNESIUM: 5.9 MEQ/L (ref 3.5–5.1)
POTASSIUM SERPL-SCNC: 3.4 MEQ/L (ref 3.5–5.1)
POTASSIUM SERPL-SCNC: 3.9 MEQ/L (ref 3.5–5.1)
POTASSIUM SERPL-SCNC: 4 MEQ/L (ref 3.5–5.1)
POTASSIUM SERPL-SCNC: 4.1 MEQ/L (ref 3.5–5.1)
POTASSIUM SERPL-SCNC: 4.4 MEQ/L (ref 3.5–5.1)
POTASSIUM SERPL-SCNC: 4.5 MEQ/L (ref 3.5–5.1)
POTASSIUM SERPL-SCNC: 4.7 MEQ/L (ref 3.5–5.1)
POTASSIUM SERPL-SCNC: 5.9 MEQ/L (ref 3.5–5.1)
POTASSIUM SERPL-SCNC: 6 MEQ/L (ref 3.5–5.1)
PROCALCITONIN: 10.52 NG/ML (ref 0–0.15)
PROTHROMBIN TIME: 12.5 SEC (ref 9.6–12.3)
PROTHROMBIN TIME: 13.1 SEC (ref 9.6–12.3)
RBC # BLD: 3.27 M/UL (ref 4.7–6.1)
RBC # BLD: 3.59 M/UL (ref 4.7–6.1)
RBC # BLD: 3.7 M/UL (ref 4.7–6.1)
RBC # BLD: 3.74 M/UL (ref 4.7–6.1)
RBC # BLD: 3.74 M/UL (ref 4.7–6.1)
RBC # BLD: 3.77 M/UL (ref 4.7–6.1)
RBC # BLD: 3.78 M/UL (ref 4.7–6.1)
RBC # BLD: 3.78 M/UL (ref 4.7–6.1)
RBC # BLD: 4.04 M/UL (ref 4.7–6.1)
RBC # BLD: 4.17 M/UL (ref 4.7–6.1)
RBC # BLD: 4.19 M/UL (ref 4.7–6.1)
RBC # BLD: 4.22 M/UL (ref 4.7–6.1)
RBC # BLD: 4.37 M/UL (ref 4.7–6.1)
RBC # BLD: 4.44 M/UL (ref 4.7–6.1)
RBC # BLD: 4.48 M/UL (ref 4.7–6.1)
RBC # BLD: 4.51 M/UL (ref 4.7–6.1)
RBC # BLD: ABNORMAL M/UL (ref 4.7–6.1)
REJECTED TEST: NORMAL
SEDIMENTATION RATE, ERYTHROCYTE: 20 MM (ref 0–20)
SLIDE REVIEW: ABNORMAL
SODIUM BLD-SCNC: 138 MEQ/L (ref 132–144)
SODIUM BLD-SCNC: 139 MEQ/L (ref 132–144)
SODIUM BLD-SCNC: 140 MEQ/L (ref 132–144)
SODIUM BLD-SCNC: 140 MEQ/L (ref 132–144)
SODIUM BLD-SCNC: 141 MEQ/L (ref 132–144)
SODIUM BLD-SCNC: 142 MEQ/L (ref 132–144)
SODIUM BLD-SCNC: 142 MEQ/L (ref 132–144)
SODIUM BLD-SCNC: 143 MEQ/L (ref 132–144)
SODIUM BLD-SCNC: 144 MEQ/L (ref 132–144)
TCO2 ARTERIAL: 19 (ref 22–29)
TCO2 ARTERIAL: 23 (ref 22–29)
TCO2 ARTERIAL: 33 (ref 22–29)
TCO2 ARTERIAL: 38 (ref 22–29)
TCO2 ARTERIAL: 40 (ref 22–29)
TOTAL CK: 238 U/L (ref 0–190)
TOTAL PROTEIN: 4.9 G/DL (ref 6.4–8.1)
TOTAL PROTEIN: 5 G/DL (ref 6.4–8.1)
TOTAL PROTEIN: 5.2 G/DL (ref 6.4–8.1)
TOTAL PROTEIN: 5.3 G/DL (ref 6.4–8.1)
TOTAL PROTEIN: 5.4 G/DL (ref 6.4–8.1)
TOTAL PROTEIN: 5.4 G/DL (ref 6.4–8.1)
TOTAL PROTEIN: 5.5 G/DL (ref 6.4–8.1)
TOTAL PROTEIN: 5.6 G/DL (ref 6.4–8.1)
TOTAL PROTEIN: 6 G/DL (ref 6.4–8.1)
TOTAL PROTEIN: 6.2 G/DL (ref 6.4–8.1)
TOTAL PROTEIN: 6.3 G/DL (ref 6.4–8.1)
TOTAL PROTEIN: 6.4 G/DL (ref 6.4–8.1)
TOTAL PROTEIN: 6.7 G/DL (ref 6.4–8.1)
TOTAL PROTEIN: 6.7 G/DL (ref 6.4–8.1)
TOTAL PROTEIN: 6.8 G/DL (ref 6.4–8.1)
TROPONIN: 0.01 NG/ML (ref 0–0.01)
TROPONIN: 0.13 NG/ML (ref 0–0.01)
TROPONIN: 0.13 NG/ML (ref 0–0.01)
TROPONIN: 0.14 NG/ML (ref 0–0.01)
TROPONIN: <0.01 NG/ML (ref 0–0.01)
VANCOMYCIN TROUGH: 17 UG/ML (ref 10–20)
VANCOMYCIN TROUGH: 18.7 UG/ML (ref 10–20)
VANCOMYCIN TROUGH: 34.1 UG/ML (ref 10–20)
WBC # BLD: 10 K/UL (ref 4.8–10.8)
WBC # BLD: 13.3 K/UL (ref 4.8–10.8)
WBC # BLD: 13.9 K/UL (ref 4.8–10.8)
WBC # BLD: 25.1 K/UL (ref 4.8–10.8)
WBC # BLD: 4 K/UL (ref 4.8–10.8)
WBC # BLD: 4.9 K/UL (ref 4.8–10.8)
WBC # BLD: 4.9 K/UL (ref 4.8–10.8)
WBC # BLD: 5.9 K/UL (ref 4.8–10.8)
WBC # BLD: 6 K/UL (ref 4.8–10.8)
WBC # BLD: 6.5 K/UL (ref 4.8–10.8)
WBC # BLD: 6.7 K/UL (ref 4.8–10.8)
WBC # BLD: 6.8 K/UL (ref 4.8–10.8)
WBC # BLD: 8.2 K/UL (ref 4.8–10.8)
WBC # BLD: 8.4 K/UL (ref 4.8–10.8)
WBC # BLD: 8.5 K/UL (ref 4.8–10.8)
WBC # BLD: 9.1 K/UL (ref 4.8–10.8)
WBC # BLD: ABNORMAL K/UL (ref 4.8–10.8)

## 2018-01-01 PROCEDURE — 6360000002 HC RX W HCPCS: Performed by: EMERGENCY MEDICINE

## 2018-01-01 PROCEDURE — 82565 ASSAY OF CREATININE: CPT

## 2018-01-01 PROCEDURE — 85025 COMPLETE CBC W/AUTO DIFF WBC: CPT

## 2018-01-01 PROCEDURE — 2500000003 HC RX 250 WO HCPCS: Performed by: PHYSICIAN ASSISTANT

## 2018-01-01 PROCEDURE — 6360000002 HC RX W HCPCS: Performed by: INTERNAL MEDICINE

## 2018-01-01 PROCEDURE — 93005 ELECTROCARDIOGRAM TRACING: CPT

## 2018-01-01 PROCEDURE — 6370000000 HC RX 637 (ALT 250 FOR IP): Performed by: INTERNAL MEDICINE

## 2018-01-01 PROCEDURE — 80202 ASSAY OF VANCOMYCIN: CPT

## 2018-01-01 PROCEDURE — 71045 X-RAY EXAM CHEST 1 VIEW: CPT

## 2018-01-01 PROCEDURE — 93283 PRGRMG EVAL IMPLANTABLE DFB: CPT

## 2018-01-01 PROCEDURE — G8988 SELF CARE GOAL STATUS: HCPCS

## 2018-01-01 PROCEDURE — 2580000003 HC RX 258: Performed by: ANESTHESIOLOGY

## 2018-01-01 PROCEDURE — 82803 BLOOD GASES ANY COMBINATION: CPT

## 2018-01-01 PROCEDURE — 99291 CRITICAL CARE FIRST HOUR: CPT | Performed by: INTERNAL MEDICINE

## 2018-01-01 PROCEDURE — 6370000000 HC RX 637 (ALT 250 FOR IP): Performed by: EMERGENCY MEDICINE

## 2018-01-01 PROCEDURE — G0480 DRUG TEST DEF 1-7 CLASSES: HCPCS

## 2018-01-01 PROCEDURE — 2700000000 HC OXYGEN THERAPY PER DAY

## 2018-01-01 PROCEDURE — 93306 TTE W/DOPPLER COMPLETE: CPT

## 2018-01-01 PROCEDURE — 6360000004 HC RX CONTRAST MEDICATION: Performed by: PHYSICIAN ASSISTANT

## 2018-01-01 PROCEDURE — 73502 X-RAY EXAM HIP UNI 2-3 VIEWS: CPT

## 2018-01-01 PROCEDURE — 97530 THERAPEUTIC ACTIVITIES: CPT

## 2018-01-01 PROCEDURE — 2500000003 HC RX 250 WO HCPCS: Performed by: INTERNAL MEDICINE

## 2018-01-01 PROCEDURE — 2580000003 HC RX 258: Performed by: PHYSICIAN ASSISTANT

## 2018-01-01 PROCEDURE — 99284 EMERGENCY DEPT VISIT MOD MDM: CPT

## 2018-01-01 PROCEDURE — 94726 PLETHYSMOGRAPHY LUNG VOLUMES: CPT | Performed by: INTERNAL MEDICINE

## 2018-01-01 PROCEDURE — 99214 OFFICE O/P EST MOD 30 MIN: CPT | Performed by: INTERNAL MEDICINE

## 2018-01-01 PROCEDURE — 94640 AIRWAY INHALATION TREATMENT: CPT

## 2018-01-01 PROCEDURE — 1210000000 HC MED SURG R&B

## 2018-01-01 PROCEDURE — 2060000000 HC ICU INTERMEDIATE R&B

## 2018-01-01 PROCEDURE — 80076 HEPATIC FUNCTION PANEL: CPT

## 2018-01-01 PROCEDURE — 97167 OT EVAL HIGH COMPLEX 60 MIN: CPT

## 2018-01-01 PROCEDURE — 3700000001 HC ADD 15 MINUTES (ANESTHESIA): Performed by: SPECIALIST

## 2018-01-01 PROCEDURE — 36600 WITHDRAWAL OF ARTERIAL BLOOD: CPT

## 2018-01-01 PROCEDURE — 2580000003 HC RX 258: Performed by: INTERNAL MEDICINE

## 2018-01-01 PROCEDURE — 5A1945Z RESPIRATORY VENTILATION, 24-96 CONSECUTIVE HOURS: ICD-10-PCS | Performed by: INTERNAL MEDICINE

## 2018-01-01 PROCEDURE — 2000000000 HC ICU R&B

## 2018-01-01 PROCEDURE — 6370000000 HC RX 637 (ALT 250 FOR IP): Performed by: STUDENT IN AN ORGANIZED HEALTH CARE EDUCATION/TRAINING PROGRAM

## 2018-01-01 PROCEDURE — 6360000002 HC RX W HCPCS: Performed by: STUDENT IN AN ORGANIZED HEALTH CARE EDUCATION/TRAINING PROGRAM

## 2018-01-01 PROCEDURE — 80053 COMPREHEN METABOLIC PANEL: CPT

## 2018-01-01 PROCEDURE — 99233 SBSQ HOSP IP/OBS HIGH 50: CPT | Performed by: INTERNAL MEDICINE

## 2018-01-01 PROCEDURE — 2580000003 HC RX 258: Performed by: NURSE ANESTHETIST, CERTIFIED REGISTERED

## 2018-01-01 PROCEDURE — 80048 BASIC METABOLIC PNL TOTAL CA: CPT

## 2018-01-01 PROCEDURE — 84132 ASSAY OF SERUM POTASSIUM: CPT

## 2018-01-01 PROCEDURE — 97116 GAIT TRAINING THERAPY: CPT

## 2018-01-01 PROCEDURE — 94660 CPAP INITIATION&MGMT: CPT

## 2018-01-01 PROCEDURE — G8987 SELF CARE CURRENT STATUS: HCPCS

## 2018-01-01 PROCEDURE — 76937 US GUIDE VASCULAR ACCESS: CPT | Performed by: RADIOLOGY

## 2018-01-01 PROCEDURE — 7100000001 HC PACU RECOVERY - ADDTL 15 MIN: Performed by: SPECIALIST

## 2018-01-01 PROCEDURE — 36415 COLL VENOUS BLD VENIPUNCTURE: CPT

## 2018-01-01 PROCEDURE — 99285 EMERGENCY DEPT VISIT HI MDM: CPT

## 2018-01-01 PROCEDURE — 71101 X-RAY EXAM UNILAT RIBS/CHEST: CPT

## 2018-01-01 PROCEDURE — 88342 IMHCHEM/IMCYTCHM 1ST ANTB: CPT

## 2018-01-01 PROCEDURE — 6370000000 HC RX 637 (ALT 250 FOR IP): Performed by: PSYCHIATRY & NEUROLOGY

## 2018-01-01 PROCEDURE — 2580000003 HC RX 258

## 2018-01-01 PROCEDURE — 85610 PROTHROMBIN TIME: CPT

## 2018-01-01 PROCEDURE — 94664 DEMO&/EVAL PT USE INHALER: CPT

## 2018-01-01 PROCEDURE — 97535 SELF CARE MNGMENT TRAINING: CPT

## 2018-01-01 PROCEDURE — 2709999900 HC NON-CHARGEABLE SUPPLY: Performed by: SPECIALIST

## 2018-01-01 PROCEDURE — 70450 CT HEAD/BRAIN W/O DYE: CPT

## 2018-01-01 PROCEDURE — 82435 ASSAY OF BLOOD CHLORIDE: CPT

## 2018-01-01 PROCEDURE — G8979 MOBILITY GOAL STATUS: HCPCS

## 2018-01-01 PROCEDURE — APPSS15 APP SPLIT SHARED TIME 0-15 MINUTES: Performed by: PHYSICIAN ASSISTANT

## 2018-01-01 PROCEDURE — G8997 SWALLOW GOAL STATUS: HCPCS

## 2018-01-01 PROCEDURE — G0378 HOSPITAL OBSERVATION PER HR: HCPCS

## 2018-01-01 PROCEDURE — 02HV33Z INSERTION OF INFUSION DEVICE INTO SUPERIOR VENA CAVA, PERCUTANEOUS APPROACH: ICD-10-PCS | Performed by: INTERNAL MEDICINE

## 2018-01-01 PROCEDURE — S0028 INJECTION, FAMOTIDINE, 20 MG: HCPCS | Performed by: INTERNAL MEDICINE

## 2018-01-01 PROCEDURE — 85652 RBC SED RATE AUTOMATED: CPT

## 2018-01-01 PROCEDURE — 87070 CULTURE OTHR SPECIMN AEROBIC: CPT

## 2018-01-01 PROCEDURE — 72125 CT NECK SPINE W/O DYE: CPT

## 2018-01-01 PROCEDURE — 83605 ASSAY OF LACTIC ACID: CPT

## 2018-01-01 PROCEDURE — 94760 N-INVAS EAR/PLS OXIMETRY 1: CPT

## 2018-01-01 PROCEDURE — 99222 1ST HOSP IP/OBS MODERATE 55: CPT | Performed by: INTERNAL MEDICINE

## 2018-01-01 PROCEDURE — 83690 ASSAY OF LIPASE: CPT

## 2018-01-01 PROCEDURE — 72131 CT LUMBAR SPINE W/O DYE: CPT

## 2018-01-01 PROCEDURE — 94002 VENT MGMT INPAT INIT DAY: CPT

## 2018-01-01 PROCEDURE — 93290 INTERROG DEV EVAL ICPMS IP: CPT

## 2018-01-01 PROCEDURE — 87205 SMEAR GRAM STAIN: CPT

## 2018-01-01 PROCEDURE — 92526 ORAL FUNCTION THERAPY: CPT

## 2018-01-01 PROCEDURE — 86141 C-REACTIVE PROTEIN HS: CPT

## 2018-01-01 PROCEDURE — 84295 ASSAY OF SERUM SODIUM: CPT

## 2018-01-01 PROCEDURE — 6370000000 HC RX 637 (ALT 250 FOR IP): Performed by: PHYSICIAN ASSISTANT

## 2018-01-01 PROCEDURE — 85014 HEMATOCRIT: CPT

## 2018-01-01 PROCEDURE — 94726 PLETHYSMOGRAPHY LUNG VOLUMES: CPT

## 2018-01-01 PROCEDURE — 94729 DIFFUSING CAPACITY: CPT | Performed by: INTERNAL MEDICINE

## 2018-01-01 PROCEDURE — 93284 PRGRMG EVAL IMPLANTABLE DFB: CPT

## 2018-01-01 PROCEDURE — G8978 MOBILITY CURRENT STATUS: HCPCS

## 2018-01-01 PROCEDURE — 85027 COMPLETE CBC AUTOMATED: CPT

## 2018-01-01 PROCEDURE — 80307 DRUG TEST PRSMV CHEM ANLYZR: CPT

## 2018-01-01 PROCEDURE — G8926 SPIRO NO PERF OR DOC: HCPCS | Performed by: INTERNAL MEDICINE

## 2018-01-01 PROCEDURE — 84484 ASSAY OF TROPONIN QUANT: CPT

## 2018-01-01 PROCEDURE — 3700000000 HC ANESTHESIA ATTENDED CARE: Performed by: SPECIALIST

## 2018-01-01 PROCEDURE — 72128 CT CHEST SPINE W/O DYE: CPT

## 2018-01-01 PROCEDURE — G8417 CALC BMI ABV UP PARAM F/U: HCPCS | Performed by: INTERNAL MEDICINE

## 2018-01-01 PROCEDURE — 2580000003 HC RX 258: Performed by: STUDENT IN AN ORGANIZED HEALTH CARE EDUCATION/TRAINING PROGRAM

## 2018-01-01 PROCEDURE — 82330 ASSAY OF CALCIUM: CPT

## 2018-01-01 PROCEDURE — 6360000002 HC RX W HCPCS: Performed by: PHYSICIAN ASSISTANT

## 2018-01-01 PROCEDURE — 87040 BLOOD CULTURE FOR BACTERIA: CPT

## 2018-01-01 PROCEDURE — 2580000003 HC RX 258: Performed by: EMERGENCY MEDICINE

## 2018-01-01 PROCEDURE — P9046 ALBUMIN (HUMAN), 25%, 20 ML: HCPCS | Performed by: INTERNAL MEDICINE

## 2018-01-01 PROCEDURE — 94729 DIFFUSING CAPACITY: CPT

## 2018-01-01 PROCEDURE — 96375 TX/PRO/DX INJ NEW DRUG ADDON: CPT

## 2018-01-01 PROCEDURE — 82553 CREATINE MB FRACTION: CPT

## 2018-01-01 PROCEDURE — 83735 ASSAY OF MAGNESIUM: CPT

## 2018-01-01 PROCEDURE — G8427 DOCREV CUR MEDS BY ELIG CLIN: HCPCS | Performed by: INTERNAL MEDICINE

## 2018-01-01 PROCEDURE — 94761 N-INVAS EAR/PLS OXIMETRY MLT: CPT

## 2018-01-01 PROCEDURE — 80162 ASSAY OF DIGOXIN TOTAL: CPT

## 2018-01-01 PROCEDURE — S0028 INJECTION, FAMOTIDINE, 20 MG: HCPCS | Performed by: PHYSICIAN ASSISTANT

## 2018-01-01 PROCEDURE — 93010 ELECTROCARDIOGRAM REPORT: CPT | Performed by: INTERNAL MEDICINE

## 2018-01-01 PROCEDURE — 97162 PT EVAL MOD COMPLEX 30 MIN: CPT

## 2018-01-01 PROCEDURE — 51702 INSERT TEMP BLADDER CATH: CPT

## 2018-01-01 PROCEDURE — 1036F TOBACCO NON-USER: CPT | Performed by: INTERNAL MEDICINE

## 2018-01-01 PROCEDURE — 3023F SPIROM DOC REV: CPT | Performed by: INTERNAL MEDICINE

## 2018-01-01 PROCEDURE — 92610 EVALUATE SWALLOWING FUNCTION: CPT

## 2018-01-01 PROCEDURE — 1123F ACP DISCUSS/DSCN MKR DOCD: CPT | Performed by: INTERNAL MEDICINE

## 2018-01-01 PROCEDURE — 6360000004 HC RX CONTRAST MEDICATION: Performed by: PSYCHIATRY & NEUROLOGY

## 2018-01-01 PROCEDURE — 36569 INSJ PICC 5 YR+ W/O IMAGING: CPT | Performed by: RADIOLOGY

## 2018-01-01 PROCEDURE — G8598 ASA/ANTIPLAT THER USED: HCPCS | Performed by: INTERNAL MEDICINE

## 2018-01-01 PROCEDURE — 94060 EVALUATION OF WHEEZING: CPT | Performed by: INTERNAL MEDICINE

## 2018-01-01 PROCEDURE — 99223 1ST HOSP IP/OBS HIGH 75: CPT | Performed by: INTERNAL MEDICINE

## 2018-01-01 PROCEDURE — 84145 PROCALCITONIN (PCT): CPT

## 2018-01-01 PROCEDURE — 6360000002 HC RX W HCPCS

## 2018-01-01 PROCEDURE — 71275 CT ANGIOGRAPHY CHEST: CPT

## 2018-01-01 PROCEDURE — 2709999900 IR PICC WO SQ PORT/PUMP > 5 YEARS

## 2018-01-01 PROCEDURE — 2580000003 HC RX 258: Performed by: SPECIALIST

## 2018-01-01 PROCEDURE — 82948 REAGENT STRIP/BLOOD GLUCOSE: CPT

## 2018-01-01 PROCEDURE — 93880 EXTRACRANIAL BILAT STUDY: CPT

## 2018-01-01 PROCEDURE — 99283 EMERGENCY DEPT VISIT LOW MDM: CPT

## 2018-01-01 PROCEDURE — 51798 US URINE CAPACITY MEASURE: CPT

## 2018-01-01 PROCEDURE — 72110 X-RAY EXAM L-2 SPINE 4/>VWS: CPT

## 2018-01-01 PROCEDURE — G8996 SWALLOW CURRENT STATUS: HCPCS

## 2018-01-01 PROCEDURE — 96374 THER/PROPH/DIAG INJ IV PUSH: CPT

## 2018-01-01 PROCEDURE — 7100000000 HC PACU RECOVERY - FIRST 15 MIN: Performed by: SPECIALIST

## 2018-01-01 PROCEDURE — 71046 X-RAY EXAM CHEST 2 VIEWS: CPT

## 2018-01-01 PROCEDURE — 74176 CT ABD & PELVIS W/O CONTRAST: CPT

## 2018-01-01 PROCEDURE — 3609017100 HC EGD: Performed by: SPECIALIST

## 2018-01-01 PROCEDURE — 4040F PNEUMOC VAC/ADMIN/RCVD: CPT | Performed by: INTERNAL MEDICINE

## 2018-01-01 PROCEDURE — 99232 SBSQ HOSP IP/OBS MODERATE 35: CPT | Performed by: INTERNAL MEDICINE

## 2018-01-01 PROCEDURE — 0BH18EZ INSERTION OF ENDOTRACHEAL AIRWAY INTO TRACHEA, VIA NATURAL OR ARTIFICIAL OPENING ENDOSCOPIC: ICD-10-PCS | Performed by: INTERNAL MEDICINE

## 2018-01-01 PROCEDURE — 96372 THER/PROPH/DIAG INJ SC/IM: CPT

## 2018-01-01 PROCEDURE — 6360000002 HC RX W HCPCS: Performed by: NURSE ANESTHETIST, CERTIFIED REGISTERED

## 2018-01-01 PROCEDURE — 85730 THROMBOPLASTIN TIME PARTIAL: CPT

## 2018-01-01 PROCEDURE — 71250 CT THORAX DX C-: CPT

## 2018-01-01 PROCEDURE — 92611 MOTION FLUOROSCOPY/SWALLOW: CPT

## 2018-01-01 PROCEDURE — 77001 FLUOROGUIDE FOR VEIN DEVICE: CPT | Performed by: RADIOLOGY

## 2018-01-01 PROCEDURE — 94010 BREATHING CAPACITY TEST: CPT

## 2018-01-01 PROCEDURE — 99282 EMERGENCY DEPT VISIT SF MDM: CPT

## 2018-01-01 PROCEDURE — 0DB68ZX EXCISION OF STOMACH, VIA NATURAL OR ARTIFICIAL OPENING ENDOSCOPIC, DIAGNOSTIC: ICD-10-PCS | Performed by: SPECIALIST

## 2018-01-01 PROCEDURE — 82550 ASSAY OF CK (CPK): CPT

## 2018-01-01 PROCEDURE — 74177 CT ABD & PELVIS W/CONTRAST: CPT

## 2018-01-01 PROCEDURE — 74230 X-RAY XM SWLNG FUNCJ C+: CPT

## 2018-01-01 PROCEDURE — 88305 TISSUE EXAM BY PATHOLOGIST: CPT

## 2018-01-01 PROCEDURE — 97163 PT EVAL HIGH COMPLEX 45 MIN: CPT

## 2018-01-01 RX ORDER — SODIUM CHLORIDE 9 MG/ML
250 INJECTION, SOLUTION INTRAVENOUS ONCE
Status: COMPLETED | OUTPATIENT
Start: 2018-01-01 | End: 2018-01-01

## 2018-01-01 RX ORDER — 0.9 % SODIUM CHLORIDE 0.9 %
30 INTRAVENOUS SOLUTION INTRAVENOUS ONCE
Status: COMPLETED | OUTPATIENT
Start: 2018-01-01 | End: 2018-01-01

## 2018-01-01 RX ORDER — FENTANYL CITRATE 50 UG/ML
50 INJECTION, SOLUTION INTRAMUSCULAR; INTRAVENOUS ONCE
Status: DISCONTINUED | OUTPATIENT
Start: 2018-01-01 | End: 2018-01-01

## 2018-01-01 RX ORDER — SODIUM CHLORIDE 9 MG/ML
INJECTION, SOLUTION INTRAVENOUS
Status: COMPLETED
Start: 2018-01-01 | End: 2018-01-01

## 2018-01-01 RX ORDER — SODIUM CHLORIDE 0.9 % (FLUSH) 0.9 %
10 SYRINGE (ML) INJECTION EVERY 12 HOURS SCHEDULED
Status: DISCONTINUED | OUTPATIENT
Start: 2018-01-01 | End: 2018-01-01 | Stop reason: HOSPADM

## 2018-01-01 RX ORDER — FENTANYL CITRATE 50 UG/ML
50 INJECTION, SOLUTION INTRAMUSCULAR; INTRAVENOUS EVERY 10 MIN PRN
Status: DISCONTINUED | OUTPATIENT
Start: 2018-01-01 | End: 2018-01-01 | Stop reason: HOSPADM

## 2018-01-01 RX ORDER — SODIUM CHLORIDE 9 MG/ML
INJECTION, SOLUTION INTRAVENOUS CONTINUOUS
Status: DISCONTINUED | OUTPATIENT
Start: 2018-01-01 | End: 2018-01-01 | Stop reason: HOSPADM

## 2018-01-01 RX ORDER — FUROSEMIDE 10 MG/ML
80 INJECTION INTRAMUSCULAR; INTRAVENOUS ONCE
Status: COMPLETED | OUTPATIENT
Start: 2018-01-01 | End: 2018-01-01

## 2018-01-01 RX ORDER — NOREPINEPHRINE BITARTRATE 1 MG/ML
INJECTION, SOLUTION INTRAVENOUS
Status: COMPLETED | OUTPATIENT
Start: 2018-01-01 | End: 2018-01-01

## 2018-01-01 RX ORDER — ONDANSETRON 2 MG/ML
4 INJECTION INTRAMUSCULAR; INTRAVENOUS
Status: DISCONTINUED | OUTPATIENT
Start: 2018-01-01 | End: 2018-01-01 | Stop reason: HOSPADM

## 2018-01-01 RX ORDER — 0.9 % SODIUM CHLORIDE 0.9 %
1000 INTRAVENOUS SOLUTION INTRAVENOUS ONCE
Status: COMPLETED | OUTPATIENT
Start: 2018-01-01 | End: 2018-01-01

## 2018-01-01 RX ORDER — SPIRONOLACTONE 25 MG/1
12.5 TABLET ORAL DAILY
COMMUNITY

## 2018-01-01 RX ORDER — LORAZEPAM 0.5 MG/1
0.5 TABLET ORAL EVERY 6 HOURS PRN
Status: DISCONTINUED | OUTPATIENT
Start: 2018-01-01 | End: 2018-01-01 | Stop reason: HOSPADM

## 2018-01-01 RX ORDER — FUROSEMIDE 10 MG/ML
40 INJECTION INTRAMUSCULAR; INTRAVENOUS 2 TIMES DAILY
Status: DISCONTINUED | OUTPATIENT
Start: 2018-01-01 | End: 2018-01-01

## 2018-01-01 RX ORDER — IPRATROPIUM BROMIDE 21 UG/1
2 SPRAY, METERED NASAL 2 TIMES DAILY
Status: DISCONTINUED | OUTPATIENT
Start: 2018-01-01 | End: 2018-01-01 | Stop reason: HOSPADM

## 2018-01-01 RX ORDER — FUROSEMIDE 10 MG/ML
40 INJECTION INTRAMUSCULAR; INTRAVENOUS ONCE
Status: COMPLETED | OUTPATIENT
Start: 2018-01-01 | End: 2018-01-01

## 2018-01-01 RX ORDER — SODIUM CHLORIDE 0.9 % (FLUSH) 0.9 %
10 SYRINGE (ML) INJECTION EVERY 12 HOURS SCHEDULED
Status: DISCONTINUED | OUTPATIENT
Start: 2018-01-01 | End: 2018-11-24 | Stop reason: HOSPADM

## 2018-01-01 RX ORDER — METHYLPREDNISOLONE SODIUM SUCCINATE 125 MG/2ML
125 INJECTION, POWDER, LYOPHILIZED, FOR SOLUTION INTRAMUSCULAR; INTRAVENOUS ONCE
Status: COMPLETED | OUTPATIENT
Start: 2018-01-01 | End: 2018-01-01

## 2018-01-01 RX ORDER — DIPHENHYDRAMINE HYDROCHLORIDE 50 MG/ML
12.5 INJECTION INTRAMUSCULAR; INTRAVENOUS
Status: DISCONTINUED | OUTPATIENT
Start: 2018-01-01 | End: 2018-01-01 | Stop reason: HOSPADM

## 2018-01-01 RX ORDER — FAMOTIDINE 20 MG/1
20 TABLET, FILM COATED ORAL 2 TIMES DAILY
Status: DISCONTINUED | OUTPATIENT
Start: 2018-01-01 | End: 2018-01-01 | Stop reason: HOSPADM

## 2018-01-01 RX ORDER — OXYCODONE HYDROCHLORIDE AND ACETAMINOPHEN 5; 325 MG/1; MG/1
1 TABLET ORAL EVERY 4 HOURS PRN
Status: DISCONTINUED | OUTPATIENT
Start: 2018-01-01 | End: 2018-01-01 | Stop reason: HOSPADM

## 2018-01-01 RX ORDER — FUROSEMIDE 40 MG/1
40 TABLET ORAL DAILY
Status: DISCONTINUED | OUTPATIENT
Start: 2018-01-01 | End: 2018-01-01

## 2018-01-01 RX ORDER — SODIUM CHLORIDE 9 MG/ML
INJECTION, SOLUTION INTRAVENOUS CONTINUOUS
Status: DISCONTINUED | OUTPATIENT
Start: 2018-01-01 | End: 2018-01-01

## 2018-01-01 RX ORDER — HYDROCODONE BITARTRATE AND ACETAMINOPHEN 5; 325 MG/1; MG/1
1 TABLET ORAL EVERY 6 HOURS PRN
Qty: 10 TABLET | Refills: 0 | Status: SHIPPED | OUTPATIENT
Start: 2018-01-01 | End: 2018-01-01

## 2018-01-01 RX ORDER — AMIODARONE HYDROCHLORIDE 200 MG/1
100 TABLET ORAL DAILY
Status: DISCONTINUED | OUTPATIENT
Start: 2018-01-01 | End: 2018-01-01

## 2018-01-01 RX ORDER — LIDOCAINE HYDROCHLORIDE 20 MG/ML
5 INJECTION, SOLUTION INFILTRATION; PERINEURAL ONCE
Status: COMPLETED | OUTPATIENT
Start: 2018-01-01 | End: 2018-01-01

## 2018-01-01 RX ORDER — SPIRONOLACTONE 25 MG/1
25 TABLET ORAL DAILY
Status: ON HOLD | COMMUNITY
Start: 2017-01-01 | End: 2018-01-01 | Stop reason: ALTCHOICE

## 2018-01-01 RX ORDER — ONDANSETRON 2 MG/ML
4 INJECTION INTRAMUSCULAR; INTRAVENOUS
Status: DISCONTINUED | OUTPATIENT
Start: 2018-01-01 | End: 2018-01-01

## 2018-01-01 RX ORDER — TORSEMIDE 20 MG/1
20 TABLET ORAL DAILY
Status: DISCONTINUED | OUTPATIENT
Start: 2018-01-01 | End: 2018-01-01

## 2018-01-01 RX ORDER — IPRATROPIUM BROMIDE AND ALBUTEROL SULFATE 2.5; .5 MG/3ML; MG/3ML
1 SOLUTION RESPIRATORY (INHALATION)
Status: DISCONTINUED | OUTPATIENT
Start: 2018-01-01 | End: 2018-01-01

## 2018-01-01 RX ORDER — VANCOMYCIN HYDROCHLORIDE 500 MG/100ML
500 INJECTION, SOLUTION INTRAVENOUS ONCE
Status: DISCONTINUED | OUTPATIENT
Start: 2018-01-01 | End: 2018-01-01

## 2018-01-01 RX ORDER — ATORVASTATIN CALCIUM 40 MG/1
40 TABLET, FILM COATED ORAL NIGHTLY
Qty: 30 TABLET | Refills: 3
Start: 2018-01-01

## 2018-01-01 RX ORDER — ALBUTEROL SULFATE 90 UG/1
2 AEROSOL, METERED RESPIRATORY (INHALATION)
Status: DISCONTINUED | OUTPATIENT
Start: 2018-01-01 | End: 2018-01-01 | Stop reason: HOSPADM

## 2018-01-01 RX ORDER — VANCOMYCIN HYDROCHLORIDE 500 MG/100ML
500 INJECTION, SOLUTION INTRAVENOUS ONCE
Status: COMPLETED | OUTPATIENT
Start: 2018-01-01 | End: 2018-01-01

## 2018-01-01 RX ORDER — SENNA AND DOCUSATE SODIUM 50; 8.6 MG/1; MG/1
1 TABLET, FILM COATED ORAL NIGHTLY PRN
Status: DISCONTINUED | OUTPATIENT
Start: 2018-01-01 | End: 2018-11-24 | Stop reason: HOSPADM

## 2018-01-01 RX ORDER — HYDRALAZINE HYDROCHLORIDE 20 MG/ML
10 INJECTION INTRAMUSCULAR; INTRAVENOUS EVERY 6 HOURS PRN
Status: DISCONTINUED | OUTPATIENT
Start: 2018-01-01 | End: 2018-01-01 | Stop reason: HOSPADM

## 2018-01-01 RX ORDER — AZITHROMYCIN 500 MG/1
500 TABLET, FILM COATED ORAL ONCE
Status: COMPLETED | OUTPATIENT
Start: 2018-01-01 | End: 2018-01-01

## 2018-01-01 RX ORDER — AMOXICILLIN AND CLAVULANATE POTASSIUM 875; 125 MG/1; MG/1
1 TABLET, FILM COATED ORAL EVERY 12 HOURS
Qty: 7 TABLET | Refills: 0
Start: 2018-01-01 | End: 2018-01-01

## 2018-01-01 RX ORDER — BUDESONIDE 0.5 MG/2ML
0.5 INHALANT ORAL 2 TIMES DAILY
Status: DISCONTINUED | OUTPATIENT
Start: 2018-01-01 | End: 2018-01-01 | Stop reason: HOSPADM

## 2018-01-01 RX ORDER — MIDAZOLAM HYDROCHLORIDE 1 MG/ML
4 INJECTION INTRAMUSCULAR; INTRAVENOUS ONCE
Status: COMPLETED | OUTPATIENT
Start: 2018-01-01 | End: 2018-01-01

## 2018-01-01 RX ORDER — AMOXICILLIN AND CLAVULANATE POTASSIUM 875; 125 MG/1; MG/1
1 TABLET, FILM COATED ORAL 2 TIMES DAILY
Qty: 20 TABLET | Refills: 0 | Status: SHIPPED | OUTPATIENT
Start: 2018-01-01 | End: 2018-01-01

## 2018-01-01 RX ORDER — ASPIRIN 81 MG/1
81 TABLET ORAL DAILY
COMMUNITY
Start: 2008-09-24

## 2018-01-01 RX ORDER — TRIFLUOPERAZINE HYDROCHLORIDE 2 MG/1
2 TABLET, FILM COATED ORAL NIGHTLY
COMMUNITY

## 2018-01-01 RX ORDER — LIDOCAINE HYDROCHLORIDE 10 MG/ML
1 INJECTION, SOLUTION EPIDURAL; INFILTRATION; INTRACAUDAL; PERINEURAL
Status: DISCONTINUED | OUTPATIENT
Start: 2018-01-01 | End: 2018-01-01 | Stop reason: HOSPADM

## 2018-01-01 RX ORDER — ATORVASTATIN CALCIUM 40 MG/1
40 TABLET, FILM COATED ORAL NIGHTLY
Status: DISCONTINUED | OUTPATIENT
Start: 2018-01-01 | End: 2018-01-01 | Stop reason: HOSPADM

## 2018-01-01 RX ORDER — LORAZEPAM 2 MG/ML
0.5 INJECTION INTRAMUSCULAR EVERY 8 HOURS PRN
Status: DISCONTINUED | OUTPATIENT
Start: 2018-01-01 | End: 2018-01-01 | Stop reason: HOSPADM

## 2018-01-01 RX ORDER — LORAZEPAM 0.5 MG/1
0.5 TABLET ORAL EVERY 6 HOURS PRN
Qty: 12 TABLET | Refills: 0 | Status: SHIPPED | OUTPATIENT
Start: 2018-01-01 | End: 2018-01-01

## 2018-01-01 RX ORDER — SODIUM POLYSTYRENE SULFONATE 15 G/60ML
30 SUSPENSION ORAL; RECTAL ONCE
Status: COMPLETED | OUTPATIENT
Start: 2018-01-01 | End: 2018-01-01

## 2018-01-01 RX ORDER — PREDNISONE 20 MG/1
40 TABLET ORAL DAILY
Qty: 20 TABLET | Refills: 0 | Status: SHIPPED | OUTPATIENT
Start: 2018-01-01 | End: 2018-01-01

## 2018-01-01 RX ORDER — ONDANSETRON 2 MG/ML
4 INJECTION INTRAMUSCULAR; INTRAVENOUS EVERY 6 HOURS PRN
Status: DISCONTINUED | OUTPATIENT
Start: 2018-01-01 | End: 2018-01-01 | Stop reason: HOSPADM

## 2018-01-01 RX ORDER — LORAZEPAM 1 MG/1
0.5 TABLET ORAL ONCE
Status: COMPLETED | OUTPATIENT
Start: 2018-01-01 | End: 2018-01-01

## 2018-01-01 RX ORDER — SODIUM CHLORIDE 0.9 % (FLUSH) 0.9 %
10 SYRINGE (ML) INJECTION PRN
Status: DISCONTINUED | OUTPATIENT
Start: 2018-01-01 | End: 2018-01-01 | Stop reason: HOSPADM

## 2018-01-01 RX ORDER — LORAZEPAM 2 MG/ML
1 INJECTION INTRAMUSCULAR ONCE
Status: COMPLETED | OUTPATIENT
Start: 2018-01-01 | End: 2018-01-01

## 2018-01-01 RX ORDER — ALBUTEROL SULFATE 90 UG/1
2 AEROSOL, METERED RESPIRATORY (INHALATION) EVERY 6 HOURS PRN
Qty: 1 INHALER | Refills: 3
Start: 2018-01-01

## 2018-01-01 RX ORDER — ONDANSETRON 2 MG/ML
4 INJECTION INTRAMUSCULAR; INTRAVENOUS ONCE
Status: COMPLETED | OUTPATIENT
Start: 2018-01-01 | End: 2018-01-01

## 2018-01-01 RX ORDER — TAMSULOSIN HYDROCHLORIDE 0.4 MG/1
0.4 CAPSULE ORAL DAILY
Status: ON HOLD | COMMUNITY
Start: 2017-10-18 | End: 2018-01-01 | Stop reason: ALTCHOICE

## 2018-01-01 RX ORDER — SODIUM CHLORIDE 0.9 % (FLUSH) 0.9 %
10 SYRINGE (ML) INJECTION PRN
Status: DISCONTINUED | OUTPATIENT
Start: 2018-01-01 | End: 2018-11-24 | Stop reason: HOSPADM

## 2018-01-01 RX ORDER — FUROSEMIDE 40 MG/1
40 TABLET ORAL DAILY
COMMUNITY

## 2018-01-01 RX ORDER — SODIUM CHLORIDE 9 MG/ML
INJECTION, SOLUTION INTRAVENOUS
Status: DISPENSED
Start: 2018-01-01 | End: 2018-01-01

## 2018-01-01 RX ORDER — LORAZEPAM 1 MG/1
1 TABLET ORAL ONCE
Status: COMPLETED | OUTPATIENT
Start: 2018-01-01 | End: 2018-01-01

## 2018-01-01 RX ORDER — IPRATROPIUM BROMIDE 21 UG/1
2 SPRAY, METERED NASAL 2 TIMES DAILY
COMMUNITY
Start: 2018-01-01

## 2018-01-01 RX ORDER — HYDROCODONE BITARTRATE AND ACETAMINOPHEN 5; 325 MG/1; MG/1
2 TABLET ORAL PRN
Status: DISCONTINUED | OUTPATIENT
Start: 2018-01-01 | End: 2018-01-01 | Stop reason: HOSPADM

## 2018-01-01 RX ORDER — FAMOTIDINE 20 MG/1
20 TABLET, FILM COATED ORAL 2 TIMES DAILY
Qty: 60 TABLET | Refills: 3 | Status: SHIPPED | OUTPATIENT
Start: 2018-01-01

## 2018-01-01 RX ORDER — DEXTROSE MONOHYDRATE 25 G/50ML
25 INJECTION, SOLUTION INTRAVENOUS ONCE
Status: COMPLETED | OUTPATIENT
Start: 2018-01-01 | End: 2018-01-01

## 2018-01-01 RX ORDER — FLUOXETINE HYDROCHLORIDE 20 MG/1
20 CAPSULE ORAL DAILY
Status: DISCONTINUED | OUTPATIENT
Start: 2018-01-01 | End: 2018-01-01 | Stop reason: HOSPADM

## 2018-01-01 RX ORDER — DIGOXIN 125 MCG
125 TABLET ORAL EVERY OTHER DAY
Status: DISCONTINUED | OUTPATIENT
Start: 2018-01-01 | End: 2018-01-01 | Stop reason: HOSPADM

## 2018-01-01 RX ORDER — SODIUM CHLORIDE, SODIUM LACTATE, POTASSIUM CHLORIDE, CALCIUM CHLORIDE 600; 310; 30; 20 MG/100ML; MG/100ML; MG/100ML; MG/100ML
INJECTION, SOLUTION INTRAVENOUS CONTINUOUS PRN
Status: DISCONTINUED | OUTPATIENT
Start: 2018-01-01 | End: 2018-01-01 | Stop reason: SDUPTHER

## 2018-01-01 RX ORDER — AMIODARONE HYDROCHLORIDE 200 MG/1
100 TABLET ORAL DAILY
Status: ON HOLD | COMMUNITY
Start: 2018-01-01 | End: 2018-01-01 | Stop reason: HOSPADM

## 2018-01-01 RX ORDER — POTASSIUM CHLORIDE 20MEQ/15ML
40 LIQUID (ML) ORAL DAILY
Status: DISCONTINUED | OUTPATIENT
Start: 2018-01-01 | End: 2018-01-01 | Stop reason: HOSPADM

## 2018-01-01 RX ORDER — CARVEDILOL 12.5 MG/1
3.12 TABLET ORAL 2 TIMES DAILY
Status: ON HOLD | COMMUNITY
Start: 2015-05-22 | End: 2018-01-01 | Stop reason: HOSPADM

## 2018-01-01 RX ORDER — SPIRONOLACTONE 25 MG/1
12.5 TABLET ORAL DAILY
Status: DISCONTINUED | OUTPATIENT
Start: 2018-01-01 | End: 2018-01-01

## 2018-01-01 RX ORDER — AMIODARONE HYDROCHLORIDE 200 MG/1
200 TABLET ORAL DAILY
Status: DISCONTINUED | OUTPATIENT
Start: 2018-01-01 | End: 2018-01-01

## 2018-01-01 RX ORDER — ALBUTEROL SULFATE 90 UG/1
2 AEROSOL, METERED RESPIRATORY (INHALATION) EVERY 6 HOURS PRN
Status: DISCONTINUED | OUTPATIENT
Start: 2018-01-01 | End: 2018-01-01

## 2018-01-01 RX ORDER — LORAZEPAM 2 MG/ML
1 INJECTION INTRAMUSCULAR ONCE
Status: DISCONTINUED | OUTPATIENT
Start: 2018-01-01 | End: 2018-01-01 | Stop reason: HOSPADM

## 2018-01-01 RX ORDER — MAGNESIUM HYDROXIDE 1200 MG/15ML
LIQUID ORAL PRN
Status: DISCONTINUED | OUTPATIENT
Start: 2018-01-01 | End: 2018-01-01 | Stop reason: HOSPADM

## 2018-01-01 RX ORDER — LORAZEPAM 2 MG/ML
1 INJECTION INTRAMUSCULAR ONCE
Status: DISCONTINUED | OUTPATIENT
Start: 2018-01-01 | End: 2018-01-01

## 2018-01-01 RX ORDER — DEXTROSE MONOHYDRATE 25 G/50ML
12.5 INJECTION, SOLUTION INTRAVENOUS PRN
Status: DISCONTINUED | OUTPATIENT
Start: 2018-01-01 | End: 2018-11-24 | Stop reason: HOSPADM

## 2018-01-01 RX ORDER — POTASSIUM CHLORIDE 7.45 MG/ML
10 INJECTION INTRAVENOUS
Status: COMPLETED | OUTPATIENT
Start: 2018-01-01 | End: 2018-01-01

## 2018-01-01 RX ORDER — PROPOFOL 10 MG/ML
INJECTION, EMULSION INTRAVENOUS PRN
Status: DISCONTINUED | OUTPATIENT
Start: 2018-01-01 | End: 2018-01-01 | Stop reason: SDUPTHER

## 2018-01-01 RX ORDER — LEVOFLOXACIN 750 MG/1
750 TABLET ORAL DAILY
Qty: 10 TABLET | Refills: 0 | Status: SHIPPED | OUTPATIENT
Start: 2018-01-01 | End: 2018-01-01

## 2018-01-01 RX ORDER — DEXTROSE MONOHYDRATE 50 MG/ML
100 INJECTION, SOLUTION INTRAVENOUS PRN
Status: DISCONTINUED | OUTPATIENT
Start: 2018-01-01 | End: 2018-11-24 | Stop reason: HOSPADM

## 2018-01-01 RX ORDER — MEPERIDINE HYDROCHLORIDE 25 MG/ML
12.5 INJECTION INTRAMUSCULAR; INTRAVENOUS; SUBCUTANEOUS EVERY 5 MIN PRN
Status: DISCONTINUED | OUTPATIENT
Start: 2018-01-01 | End: 2018-01-01 | Stop reason: HOSPADM

## 2018-01-01 RX ORDER — IPRATROPIUM BROMIDE AND ALBUTEROL SULFATE 2.5; .5 MG/3ML; MG/3ML
1 SOLUTION RESPIRATORY (INHALATION) EVERY 4 HOURS PRN
Status: DISCONTINUED | OUTPATIENT
Start: 2018-01-01 | End: 2018-01-01

## 2018-01-01 RX ORDER — DOCUSATE SODIUM 100 MG/1
100 CAPSULE, LIQUID FILLED ORAL 2 TIMES DAILY PRN
Status: DISCONTINUED | OUTPATIENT
Start: 2018-01-01 | End: 2018-11-24 | Stop reason: HOSPADM

## 2018-01-01 RX ORDER — LANOLIN ALCOHOL/MO/W.PET/CERES
3 CREAM (GRAM) TOPICAL NIGHTLY
COMMUNITY

## 2018-01-01 RX ORDER — LORAZEPAM 0.5 MG/1
0.5 TABLET ORAL EVERY 6 HOURS PRN
Qty: 7 TABLET | Refills: 0 | Status: ON HOLD | OUTPATIENT
Start: 2018-01-01 | End: 2018-01-01 | Stop reason: HOSPADM

## 2018-01-01 RX ORDER — HYDROCODONE BITARTRATE AND ACETAMINOPHEN 5; 325 MG/1; MG/1
1 TABLET ORAL PRN
Status: DISCONTINUED | OUTPATIENT
Start: 2018-01-01 | End: 2018-01-01 | Stop reason: HOSPADM

## 2018-01-01 RX ORDER — IPRATROPIUM BROMIDE AND ALBUTEROL SULFATE 2.5; .5 MG/3ML; MG/3ML
1 SOLUTION RESPIRATORY (INHALATION) 3 TIMES DAILY
Status: DISCONTINUED | OUTPATIENT
Start: 2018-01-01 | End: 2018-01-01 | Stop reason: HOSPADM

## 2018-01-01 RX ORDER — LISINOPRIL 2.5 MG/1
2.5 TABLET ORAL DAILY
Status: DISCONTINUED | OUTPATIENT
Start: 2018-01-01 | End: 2018-01-01

## 2018-01-01 RX ORDER — ACETAMINOPHEN 325 MG/1
650 TABLET ORAL EVERY 6 HOURS PRN
Status: DISCONTINUED | OUTPATIENT
Start: 2018-01-01 | End: 2018-01-01 | Stop reason: HOSPADM

## 2018-01-01 RX ORDER — DIPHENHYDRAMINE HCL 25 MG
25 TABLET ORAL EVERY EVENING
COMMUNITY

## 2018-01-01 RX ORDER — HYDRALAZINE HYDROCHLORIDE 20 MG/ML
10 INJECTION INTRAMUSCULAR; INTRAVENOUS EVERY 4 HOURS PRN
Status: DISCONTINUED | OUTPATIENT
Start: 2018-01-01 | End: 2018-11-24 | Stop reason: HOSPADM

## 2018-01-01 RX ORDER — LANOLIN ALCOHOL/MO/W.PET/CERES
3 CREAM (GRAM) TOPICAL NIGHTLY
Status: DISCONTINUED | OUTPATIENT
Start: 2018-01-01 | End: 2018-01-01 | Stop reason: HOSPADM

## 2018-01-01 RX ORDER — FENTANYL CITRATE 50 UG/ML
25 INJECTION, SOLUTION INTRAMUSCULAR; INTRAVENOUS ONCE
Status: DISCONTINUED | OUTPATIENT
Start: 2018-01-01 | End: 2018-01-01

## 2018-01-01 RX ORDER — PROPOFOL 10 MG/ML
10 INJECTION, EMULSION INTRAVENOUS
Status: DISCONTINUED | OUTPATIENT
Start: 2018-01-01 | End: 2018-11-24 | Stop reason: HOSPADM

## 2018-01-01 RX ORDER — LIDOCAINE 4 G/G
2 PATCH TOPICAL DAILY
Status: DISCONTINUED | OUTPATIENT
Start: 2018-01-01 | End: 2018-11-24 | Stop reason: HOSPADM

## 2018-01-01 RX ORDER — KETOROLAC TROMETHAMINE 15 MG/ML
15 INJECTION, SOLUTION INTRAMUSCULAR; INTRAVENOUS ONCE
Status: COMPLETED | OUTPATIENT
Start: 2018-01-01 | End: 2018-01-01

## 2018-01-01 RX ORDER — FLUOXETINE HYDROCHLORIDE 40 MG/1
20 CAPSULE ORAL DAILY
COMMUNITY
Start: 2018-01-01

## 2018-01-01 RX ORDER — IPRATROPIUM BROMIDE AND ALBUTEROL SULFATE 2.5; .5 MG/3ML; MG/3ML
3 SOLUTION RESPIRATORY (INHALATION) EVERY 6 HOURS PRN
Qty: 360 ML | Refills: 1 | Status: SHIPPED | OUTPATIENT
Start: 2018-01-01

## 2018-01-01 RX ORDER — METOCLOPRAMIDE HYDROCHLORIDE 5 MG/ML
10 INJECTION INTRAMUSCULAR; INTRAVENOUS
Status: DISCONTINUED | OUTPATIENT
Start: 2018-01-01 | End: 2018-01-01 | Stop reason: HOSPADM

## 2018-01-01 RX ORDER — SUCRALFATE 1 G/1
1 TABLET ORAL ONCE
Status: COMPLETED | OUTPATIENT
Start: 2018-01-01 | End: 2018-01-01

## 2018-01-01 RX ORDER — ACETAMINOPHEN 80 MG
TABLET,CHEWABLE ORAL ONCE
Status: DISCONTINUED | OUTPATIENT
Start: 2018-01-01 | End: 2018-01-01 | Stop reason: HOSPADM

## 2018-01-01 RX ORDER — ALBUTEROL SULFATE 2.5 MG/3ML
2.5 SOLUTION RESPIRATORY (INHALATION)
Status: COMPLETED | OUTPATIENT
Start: 2018-01-01 | End: 2018-01-01

## 2018-01-01 RX ORDER — ALBUTEROL SULFATE 90 UG/1
2 AEROSOL, METERED RESPIRATORY (INHALATION) EVERY 6 HOURS PRN
Qty: 1 INHALER | Refills: 5 | Status: SHIPPED | OUTPATIENT
Start: 2018-01-01

## 2018-01-01 RX ORDER — SODIUM CHLORIDE, SODIUM LACTATE, POTASSIUM CHLORIDE, CALCIUM CHLORIDE 600; 310; 30; 20 MG/100ML; MG/100ML; MG/100ML; MG/100ML
INJECTION, SOLUTION INTRAVENOUS
Status: COMPLETED
Start: 2018-01-01 | End: 2018-01-01

## 2018-01-01 RX ORDER — CARVEDILOL 12.5 MG/1
12.5 TABLET ORAL 2 TIMES DAILY
Status: DISCONTINUED | OUTPATIENT
Start: 2018-01-01 | End: 2018-01-01

## 2018-01-01 RX ORDER — ALBUMIN (HUMAN) 12.5 G/50ML
50 SOLUTION INTRAVENOUS 2 TIMES DAILY
Status: COMPLETED | OUTPATIENT
Start: 2018-01-01 | End: 2018-01-01

## 2018-01-01 RX ORDER — NICOTINE POLACRILEX 4 MG
15 LOZENGE BUCCAL PRN
Status: DISCONTINUED | OUTPATIENT
Start: 2018-01-01 | End: 2018-11-24 | Stop reason: HOSPADM

## 2018-01-01 RX ORDER — SENNOSIDES 8.6 MG
1300 CAPSULE ORAL 2 TIMES DAILY
Status: ON HOLD | COMMUNITY
Start: 2012-05-29 | End: 2018-01-01

## 2018-01-01 RX ORDER — ACETAMINOPHEN 500 MG
1000 TABLET ORAL ONCE
Status: COMPLETED | OUTPATIENT
Start: 2018-01-01 | End: 2018-01-01

## 2018-01-01 RX ORDER — POLYETHYLENE GLYCOL 3350 17 G/17G
17 POWDER, FOR SOLUTION ORAL DAILY
Status: DISCONTINUED | OUTPATIENT
Start: 2018-01-01 | End: 2018-11-24 | Stop reason: HOSPADM

## 2018-01-01 RX ORDER — METOPROLOL TARTRATE 5 MG/5ML
2.5 INJECTION INTRAVENOUS EVERY 4 HOURS PRN
Status: DISCONTINUED | OUTPATIENT
Start: 2018-01-01 | End: 2018-01-01 | Stop reason: HOSPADM

## 2018-01-01 RX ORDER — CARVEDILOL 3.12 MG/1
3.12 TABLET ORAL 2 TIMES DAILY WITH MEALS
COMMUNITY

## 2018-01-01 RX ORDER — IPRATROPIUM BROMIDE 21 UG/1
2 SPRAY, METERED NASAL 2 TIMES DAILY
Status: ON HOLD | COMMUNITY
Start: 2018-01-01 | End: 2018-01-01 | Stop reason: ALTCHOICE

## 2018-01-01 RX ORDER — CYCLOBENZAPRINE HCL 10 MG
10 TABLET ORAL 3 TIMES DAILY PRN
Qty: 21 TABLET | Refills: 0 | Status: ON HOLD | OUTPATIENT
Start: 2018-01-01 | End: 2018-01-01 | Stop reason: ALTCHOICE

## 2018-01-01 RX ORDER — ASPIRIN 81 MG/1
81 TABLET, CHEWABLE ORAL DAILY
Status: DISCONTINUED | OUTPATIENT
Start: 2018-01-01 | End: 2018-01-01 | Stop reason: HOSPADM

## 2018-01-01 RX ORDER — SPIRONOLACTONE 25 MG/1
12.5 TABLET ORAL DAILY
Status: DISCONTINUED | OUTPATIENT
Start: 2018-01-01 | End: 2018-01-01 | Stop reason: HOSPADM

## 2018-01-01 RX ORDER — IBUPROFEN 400 MG/1
400 TABLET ORAL ONCE
Status: COMPLETED | OUTPATIENT
Start: 2018-01-01 | End: 2018-01-01

## 2018-01-01 RX ORDER — IPRATROPIUM BROMIDE AND ALBUTEROL SULFATE 2.5; .5 MG/3ML; MG/3ML
1 SOLUTION RESPIRATORY (INHALATION) 4 TIMES DAILY
Status: DISCONTINUED | OUTPATIENT
Start: 2018-01-01 | End: 2018-01-01

## 2018-01-01 RX ORDER — ETOMIDATE 2 MG/ML
INJECTION INTRAVENOUS
Status: COMPLETED | OUTPATIENT
Start: 2018-01-01 | End: 2018-01-01

## 2018-01-01 RX ORDER — MIDAZOLAM HYDROCHLORIDE 1 MG/ML
INJECTION INTRAMUSCULAR; INTRAVENOUS
Status: COMPLETED
Start: 2018-01-01 | End: 2018-01-01

## 2018-01-01 RX ORDER — SODIUM CHLORIDE, SODIUM LACTATE, POTASSIUM CHLORIDE, CALCIUM CHLORIDE 600; 310; 30; 20 MG/100ML; MG/100ML; MG/100ML; MG/100ML
INJECTION, SOLUTION INTRAVENOUS CONTINUOUS
Status: DISCONTINUED | OUTPATIENT
Start: 2018-01-01 | End: 2018-01-01

## 2018-01-01 RX ADMIN — MELATONIN TAB 3 MG 3 MG: 3 TAB at 21:14

## 2018-01-01 RX ADMIN — Medication 10 ML: at 20:10

## 2018-01-01 RX ADMIN — TORSEMIDE 20 MG: 20 TABLET ORAL at 08:05

## 2018-01-01 RX ADMIN — FAMOTIDINE 20 MG: 20 TABLET ORAL at 20:18

## 2018-01-01 RX ADMIN — IPRATROPIUM BROMIDE 2 SPRAY: 21 SPRAY NASAL at 22:27

## 2018-01-01 RX ADMIN — INSULIN HUMAN 10 UNITS: 100 INJECTION, SOLUTION PARENTERAL at 17:38

## 2018-01-01 RX ADMIN — OXYCODONE AND ACETAMINOPHEN 1 TABLET: 5; 325 TABLET ORAL at 11:36

## 2018-01-01 RX ADMIN — SODIUM CHLORIDE 100 ML/HR: 9 INJECTION, SOLUTION INTRAVENOUS at 06:04

## 2018-01-01 RX ADMIN — CARBIDOPA AND LEVODOPA 1 TABLET: 25; 100 TABLET ORAL at 08:05

## 2018-01-01 RX ADMIN — CARBIDOPA AND LEVODOPA 1 TABLET: 25; 100 TABLET ORAL at 11:03

## 2018-01-01 RX ADMIN — PIPERACILLIN SODIUM,TAZOBACTAM SODIUM 3.38 G: 3; .375 INJECTION, POWDER, FOR SOLUTION INTRAVENOUS at 10:13

## 2018-01-01 RX ADMIN — PIPERACILLIN SODIUM,TAZOBACTAM SODIUM 3.38 G: 3; .375 INJECTION, POWDER, FOR SOLUTION INTRAVENOUS at 12:10

## 2018-01-01 RX ADMIN — LIDOCAINE HYDROCHLORIDE 5 ML: 20 INJECTION, SOLUTION INFILTRATION; PERINEURAL at 17:00

## 2018-01-01 RX ADMIN — PIPERACILLIN SODIUM,TAZOBACTAM SODIUM 3.38 G: 3; .375 INJECTION, POWDER, FOR SOLUTION INTRAVENOUS at 15:53

## 2018-01-01 RX ADMIN — LORAZEPAM 0.5 MG: 2 INJECTION INTRAMUSCULAR; INTRAVENOUS at 13:03

## 2018-01-01 RX ADMIN — PIPERACILLIN SODIUM,TAZOBACTAM SODIUM 3.38 G: 3; .375 INJECTION, POWDER, FOR SOLUTION INTRAVENOUS at 11:39

## 2018-01-01 RX ADMIN — FLUOXETINE HYDROCHLORIDE 20 MG: 20 CAPSULE ORAL at 11:03

## 2018-01-01 RX ADMIN — Medication 10 ML: at 20:45

## 2018-01-01 RX ADMIN — Medication 10 ML: at 11:40

## 2018-01-01 RX ADMIN — VANCOMYCIN HYDROCHLORIDE 1250 MG: 5 INJECTION, POWDER, LYOPHILIZED, FOR SOLUTION INTRAVENOUS at 22:48

## 2018-01-01 RX ADMIN — ATORVASTATIN CALCIUM 40 MG: 40 TABLET, FILM COATED ORAL at 20:10

## 2018-01-01 RX ADMIN — IPRATROPIUM BROMIDE 2 SPRAY: 21 SPRAY NASAL at 20:19

## 2018-01-01 RX ADMIN — SPIRONOLACTONE 25 MG: 25 TABLET ORAL at 08:50

## 2018-01-01 RX ADMIN — Medication 10 ML: at 08:07

## 2018-01-01 RX ADMIN — IPRATROPIUM BROMIDE AND ALBUTEROL SULFATE 1 AMPULE: 2.5; .5 SOLUTION RESPIRATORY (INHALATION) at 15:27

## 2018-01-01 RX ADMIN — MIDAZOLAM HYDROCHLORIDE 4 MG: 1 INJECTION INTRAMUSCULAR; INTRAVENOUS at 19:41

## 2018-01-01 RX ADMIN — PIPERACILLIN SODIUM,TAZOBACTAM SODIUM 3.38 G: 3; .375 INJECTION, POWDER, FOR SOLUTION INTRAVENOUS at 21:50

## 2018-01-01 RX ADMIN — SPIRONOLACTONE 12.5 MG: 25 TABLET ORAL at 08:08

## 2018-01-01 RX ADMIN — FAMOTIDINE 20 MG: 20 TABLET ORAL at 21:50

## 2018-01-01 RX ADMIN — VANCOMYCIN HYDROCHLORIDE 1250 MG: 5 INJECTION, POWDER, LYOPHILIZED, FOR SOLUTION INTRAVENOUS at 08:20

## 2018-01-01 RX ADMIN — NYSTATIN 500000 UNITS: 500000 SUSPENSION ORAL at 16:08

## 2018-01-01 RX ADMIN — NYSTATIN 500000 UNITS: 500000 SUSPENSION ORAL at 22:26

## 2018-01-01 RX ADMIN — SODIUM CHLORIDE, POTASSIUM CHLORIDE, SODIUM LACTATE AND CALCIUM CHLORIDE: 600; 310; 30; 20 INJECTION, SOLUTION INTRAVENOUS at 08:57

## 2018-01-01 RX ADMIN — POTASSIUM CHLORIDE 40 MEQ: 20 SOLUTION ORAL at 08:04

## 2018-01-01 RX ADMIN — VANCOMYCIN HYDROCHLORIDE 1250 MG: 5 INJECTION, POWDER, LYOPHILIZED, FOR SOLUTION INTRAVENOUS at 12:11

## 2018-01-01 RX ADMIN — IPRATROPIUM BROMIDE AND ALBUTEROL SULFATE 1 AMPULE: 2.5; .5 SOLUTION RESPIRATORY (INHALATION) at 04:55

## 2018-01-01 RX ADMIN — FLUOXETINE HYDROCHLORIDE 20 MG: 20 CAPSULE ORAL at 09:40

## 2018-01-01 RX ADMIN — NYSTATIN 500000 UNITS: 500000 SUSPENSION ORAL at 09:12

## 2018-01-01 RX ADMIN — BUDESONIDE 500 MCG: 0.5 SUSPENSION RESPIRATORY (INHALATION) at 17:17

## 2018-01-01 RX ADMIN — IPRATROPIUM BROMIDE AND ALBUTEROL SULFATE 1 AMPULE: 2.5; .5 SOLUTION RESPIRATORY (INHALATION) at 04:07

## 2018-01-01 RX ADMIN — FUROSEMIDE 80 MG: 10 INJECTION, SOLUTION INTRAMUSCULAR; INTRAVENOUS at 15:53

## 2018-01-01 RX ADMIN — MELATONIN TAB 3 MG 3 MG: 3 TAB at 21:06

## 2018-01-01 RX ADMIN — PIPERACILLIN SODIUM,TAZOBACTAM SODIUM 3.38 G: 3; .375 INJECTION, POWDER, FOR SOLUTION INTRAVENOUS at 10:44

## 2018-01-01 RX ADMIN — ASPIRIN 81 MG: 81 TABLET, CHEWABLE ORAL at 08:04

## 2018-01-01 RX ADMIN — IPRATROPIUM BROMIDE AND ALBUTEROL SULFATE 1 AMPULE: 2.5; .5 SOLUTION RESPIRATORY (INHALATION) at 14:34

## 2018-01-01 RX ADMIN — NYSTATIN 500000 UNITS: 500000 SUSPENSION ORAL at 21:09

## 2018-01-01 RX ADMIN — IPRATROPIUM BROMIDE AND ALBUTEROL SULFATE 1 AMPULE: 2.5; .5 SOLUTION RESPIRATORY (INHALATION) at 16:25

## 2018-01-01 RX ADMIN — NOREPINEPHRINE BITARTRATE 10 MCG/MIN: 1 INJECTION INTRAVENOUS at 19:54

## 2018-01-01 RX ADMIN — ASPIRIN 81 MG: 81 TABLET, CHEWABLE ORAL at 08:07

## 2018-01-01 RX ADMIN — BUDESONIDE 500 MCG: 0.5 SUSPENSION RESPIRATORY (INHALATION) at 09:32

## 2018-01-01 RX ADMIN — Medication 10 ML: at 21:55

## 2018-01-01 RX ADMIN — MELATONIN TAB 3 MG 3 MG: 3 TAB at 22:26

## 2018-01-01 RX ADMIN — IOPAMIDOL 100 ML: 755 INJECTION, SOLUTION INTRAVENOUS at 16:53

## 2018-01-01 RX ADMIN — LORAZEPAM 1 MG: 1 TABLET ORAL at 16:17

## 2018-01-01 RX ADMIN — CHLOROTHIAZIDE SODIUM 500 MG: 500 INJECTION, POWDER, LYOPHILIZED, FOR SOLUTION INTRAVENOUS at 15:12

## 2018-01-01 RX ADMIN — CARVEDILOL 12.5 MG: 12.5 TABLET, FILM COATED ORAL at 20:10

## 2018-01-01 RX ADMIN — SODIUM CHLORIDE 1000 ML: 9 INJECTION, SOLUTION INTRAVENOUS at 02:09

## 2018-01-01 RX ADMIN — PROPOFOL 30 MG: 10 INJECTION, EMULSION INTRAVENOUS at 16:56

## 2018-01-01 RX ADMIN — MELATONIN TAB 3 MG 3 MG: 3 TAB at 20:42

## 2018-01-01 RX ADMIN — FUROSEMIDE 40 MG: 10 INJECTION, SOLUTION INTRAMUSCULAR; INTRAVENOUS at 08:44

## 2018-01-01 RX ADMIN — FLUOXETINE HYDROCHLORIDE 20 MG: 20 CAPSULE ORAL at 08:08

## 2018-01-01 RX ADMIN — MELATONIN TAB 3 MG 3 MG: 3 TAB at 21:09

## 2018-01-01 RX ADMIN — BUDESONIDE 500 MCG: 0.5 SUSPENSION RESPIRATORY (INHALATION) at 19:14

## 2018-01-01 RX ADMIN — PROPOFOL 20 MG: 10 INJECTION, EMULSION INTRAVENOUS at 17:02

## 2018-01-01 RX ADMIN — SODIUM CHLORIDE 1000 ML: 9 INJECTION, SOLUTION INTRAVENOUS at 21:30

## 2018-01-01 RX ADMIN — ATORVASTATIN CALCIUM 40 MG: 40 TABLET, FILM COATED ORAL at 20:42

## 2018-01-01 RX ADMIN — IPRATROPIUM BROMIDE 2 SPRAY: 21 SPRAY NASAL at 09:00

## 2018-01-01 RX ADMIN — POTASSIUM CHLORIDE 40 MEQ: 20 SOLUTION ORAL at 08:59

## 2018-01-01 RX ADMIN — BUDESONIDE 500 MCG: 0.5 SUSPENSION RESPIRATORY (INHALATION) at 11:27

## 2018-01-01 RX ADMIN — Medication 10 ML: at 09:53

## 2018-01-01 RX ADMIN — SODIUM CHLORIDE 1.5 G: 900 INJECTION INTRAVENOUS at 17:54

## 2018-01-01 RX ADMIN — TORSEMIDE 20 MG: 20 TABLET ORAL at 08:59

## 2018-01-01 RX ADMIN — LORAZEPAM 0.5 MG: 0.5 TABLET ORAL at 17:29

## 2018-01-01 RX ADMIN — PIPERACILLIN SODIUM,TAZOBACTAM SODIUM 3.38 G: 3; .375 INJECTION, POWDER, FOR SOLUTION INTRAVENOUS at 04:21

## 2018-01-01 RX ADMIN — CARBIDOPA AND LEVODOPA 1 TABLET: 25; 100 TABLET ORAL at 18:32

## 2018-01-01 RX ADMIN — FAMOTIDINE 20 MG: 20 TABLET ORAL at 08:08

## 2018-01-01 RX ADMIN — HYDRALAZINE HYDROCHLORIDE 10 MG: 20 INJECTION INTRAMUSCULAR; INTRAVENOUS at 11:14

## 2018-01-01 RX ADMIN — FUROSEMIDE 40 MG: 10 INJECTION, SOLUTION INTRAMUSCULAR; INTRAVENOUS at 17:32

## 2018-01-01 RX ADMIN — CARBIDOPA AND LEVODOPA 1 TABLET: 25; 100 TABLET ORAL at 08:44

## 2018-01-01 RX ADMIN — PROPOFOL 10 MG: 10 INJECTION, EMULSION INTRAVENOUS at 09:02

## 2018-01-01 RX ADMIN — FUROSEMIDE 40 MG: 10 INJECTION, SOLUTION INTRAMUSCULAR; INTRAVENOUS at 09:40

## 2018-01-01 RX ADMIN — IPRATROPIUM BROMIDE AND ALBUTEROL SULFATE 1 AMPULE: 2.5; .5 SOLUTION RESPIRATORY (INHALATION) at 17:17

## 2018-01-01 RX ADMIN — PROPOFOL 20 MG: 10 INJECTION, EMULSION INTRAVENOUS at 17:00

## 2018-01-01 RX ADMIN — MELATONIN TAB 3 MG 3 MG: 3 TAB at 21:50

## 2018-01-01 RX ADMIN — ALBUMIN (HUMAN) 50 G: 0.25 INJECTION, SOLUTION INTRAVENOUS at 09:00

## 2018-01-01 RX ADMIN — ASPIRIN 81 MG: 81 TABLET, CHEWABLE ORAL at 08:43

## 2018-01-01 RX ADMIN — IOPAMIDOL 100 ML: 755 INJECTION, SOLUTION INTRAVENOUS at 15:44

## 2018-01-01 RX ADMIN — IPRATROPIUM BROMIDE AND ALBUTEROL SULFATE 1 AMPULE: .5; 3 SOLUTION RESPIRATORY (INHALATION) at 13:18

## 2018-01-01 RX ADMIN — LORAZEPAM 0.5 MG: 1 TABLET ORAL at 05:38

## 2018-01-01 RX ADMIN — PIPERACILLIN SODIUM,TAZOBACTAM SODIUM 3.38 G: 3; .375 INJECTION, POWDER, FOR SOLUTION INTRAVENOUS at 06:00

## 2018-01-01 RX ADMIN — NOREPINEPHRINE BITARTRATE 24 MCG/MIN: 1 INJECTION INTRAVENOUS at 07:11

## 2018-01-01 RX ADMIN — BUDESONIDE 500 MCG: 0.5 SUSPENSION RESPIRATORY (INHALATION) at 20:01

## 2018-01-01 RX ADMIN — IPRATROPIUM BROMIDE AND ALBUTEROL SULFATE 1 AMPULE: 2.5; .5 SOLUTION RESPIRATORY (INHALATION) at 11:18

## 2018-01-01 RX ADMIN — PIPERACILLIN SODIUM,TAZOBACTAM SODIUM 3.38 G: 3; .375 INJECTION, POWDER, FOR SOLUTION INTRAVENOUS at 17:34

## 2018-01-01 RX ADMIN — Medication 10 ML: at 20:11

## 2018-01-01 RX ADMIN — LORAZEPAM 0.5 MG: 0.5 TABLET ORAL at 16:08

## 2018-01-01 RX ADMIN — IPRATROPIUM BROMIDE AND ALBUTEROL SULFATE 1 AMPULE: 2.5; .5 SOLUTION RESPIRATORY (INHALATION) at 19:23

## 2018-01-01 RX ADMIN — ALBUTEROL SULFATE 2.5 MG: 2.5 SOLUTION RESPIRATORY (INHALATION) at 18:06

## 2018-01-01 RX ADMIN — FLUOXETINE HYDROCHLORIDE 20 MG: 20 CAPSULE ORAL at 11:37

## 2018-01-01 RX ADMIN — SPIRONOLACTONE 12.5 MG: 25 TABLET ORAL at 08:44

## 2018-01-01 RX ADMIN — VANCOMYCIN HYDROCHLORIDE 1250 MG: 5 INJECTION, POWDER, LYOPHILIZED, FOR SOLUTION INTRAVENOUS at 21:48

## 2018-01-01 RX ADMIN — IPRATROPIUM BROMIDE 2 SPRAY: 21 SPRAY NASAL at 11:49

## 2018-01-01 RX ADMIN — VANCOMYCIN HYDROCHLORIDE 1250 MG: 5 INJECTION, POWDER, LYOPHILIZED, FOR SOLUTION INTRAVENOUS at 09:45

## 2018-01-01 RX ADMIN — VANCOMYCIN HYDROCHLORIDE 1250 MG: 5 INJECTION, POWDER, LYOPHILIZED, FOR SOLUTION INTRAVENOUS at 21:56

## 2018-01-01 RX ADMIN — NYSTATIN 500000 UNITS: 500000 SUSPENSION ORAL at 08:50

## 2018-01-01 RX ADMIN — IPRATROPIUM BROMIDE AND ALBUTEROL SULFATE 1 AMPULE: 2.5; .5 SOLUTION RESPIRATORY (INHALATION) at 10:48

## 2018-01-01 RX ADMIN — SODIUM CHLORIDE: 9 INJECTION, SOLUTION INTRAVENOUS at 09:30

## 2018-01-01 RX ADMIN — INSULIN LISPRO 1 UNITS: 100 INJECTION, SOLUTION INTRAVENOUS; SUBCUTANEOUS at 06:57

## 2018-01-01 RX ADMIN — BUDESONIDE 500 MCG: 0.5 SUSPENSION RESPIRATORY (INHALATION) at 07:10

## 2018-01-01 RX ADMIN — FUROSEMIDE 40 MG: 40 TABLET ORAL at 13:29

## 2018-01-01 RX ADMIN — KETOROLAC TROMETHAMINE 15 MG: 15 INJECTION, SOLUTION INTRAMUSCULAR; INTRAVENOUS at 17:25

## 2018-01-01 RX ADMIN — LORAZEPAM 0.5 MG: 0.5 TABLET ORAL at 16:54

## 2018-01-01 RX ADMIN — ATORVASTATIN CALCIUM 40 MG: 40 TABLET, FILM COATED ORAL at 21:14

## 2018-01-01 RX ADMIN — HYDRALAZINE HYDROCHLORIDE 10 MG: 20 INJECTION INTRAMUSCULAR; INTRAVENOUS at 16:42

## 2018-01-01 RX ADMIN — PIPERACILLIN SODIUM,TAZOBACTAM SODIUM 3.38 G: 3; .375 INJECTION, POWDER, FOR SOLUTION INTRAVENOUS at 19:43

## 2018-01-01 RX ADMIN — PIPERACILLIN SODIUM,TAZOBACTAM SODIUM 3.38 G: 3; .375 INJECTION, POWDER, FOR SOLUTION INTRAVENOUS at 03:15

## 2018-01-01 RX ADMIN — SODIUM CHLORIDE 1000 ML: 9 INJECTION, SOLUTION INTRAVENOUS at 19:43

## 2018-01-01 RX ADMIN — Medication 10 ML: at 08:58

## 2018-01-01 RX ADMIN — AMIODARONE HYDROCHLORIDE 200 MG: 200 TABLET ORAL at 12:22

## 2018-01-01 RX ADMIN — PIPERACILLIN SODIUM,TAZOBACTAM SODIUM 3.38 G: 3; .375 INJECTION, POWDER, FOR SOLUTION INTRAVENOUS at 11:00

## 2018-01-01 RX ADMIN — CARBIDOPA AND LEVODOPA 1 TABLET: 25; 100 TABLET ORAL at 17:34

## 2018-01-01 RX ADMIN — VANCOMYCIN HYDROCHLORIDE 1250 MG: 5 INJECTION, POWDER, LYOPHILIZED, FOR SOLUTION INTRAVENOUS at 11:30

## 2018-01-01 RX ADMIN — ONDANSETRON 4 MG: 2 INJECTION INTRAMUSCULAR; INTRAVENOUS at 00:22

## 2018-01-01 RX ADMIN — NYSTATIN 500000 UNITS: 500000 SUSPENSION ORAL at 14:33

## 2018-01-01 RX ADMIN — IPRATROPIUM BROMIDE AND ALBUTEROL SULFATE 1 AMPULE: 2.5; .5 SOLUTION RESPIRATORY (INHALATION) at 09:30

## 2018-01-01 RX ADMIN — FLUOXETINE HYDROCHLORIDE 20 MG: 20 CAPSULE ORAL at 09:13

## 2018-01-01 RX ADMIN — FAMOTIDINE 20 MG: 20 TABLET ORAL at 20:42

## 2018-01-01 RX ADMIN — Medication 10 MEQ: at 11:02

## 2018-01-01 RX ADMIN — CARBIDOPA AND LEVODOPA 1 TABLET: 25; 100 TABLET ORAL at 11:37

## 2018-01-01 RX ADMIN — CARBIDOPA AND LEVODOPA 1 TABLET: 25; 100 TABLET ORAL at 18:06

## 2018-01-01 RX ADMIN — IPRATROPIUM BROMIDE AND ALBUTEROL SULFATE 1 AMPULE: 2.5; .5 SOLUTION RESPIRATORY (INHALATION) at 08:17

## 2018-01-01 RX ADMIN — BUDESONIDE 500 MCG: 0.5 SUSPENSION RESPIRATORY (INHALATION) at 19:07

## 2018-01-01 RX ADMIN — OXYCODONE AND ACETAMINOPHEN 1 TABLET: 5; 325 TABLET ORAL at 11:40

## 2018-01-01 RX ADMIN — ASPIRIN 81 MG: 81 TABLET, CHEWABLE ORAL at 08:59

## 2018-01-01 RX ADMIN — FAMOTIDINE 20 MG: 20 TABLET ORAL at 09:39

## 2018-01-01 RX ADMIN — ENOXAPARIN SODIUM 30 MG: 30 INJECTION SUBCUTANEOUS at 11:15

## 2018-01-01 RX ADMIN — BUDESONIDE 500 MCG: 0.5 SUSPENSION RESPIRATORY (INHALATION) at 22:04

## 2018-01-01 RX ADMIN — LORAZEPAM 0.5 MG: 1 TABLET ORAL at 15:51

## 2018-01-01 RX ADMIN — LORAZEPAM 0.5 MG: 0.5 TABLET ORAL at 16:39

## 2018-01-01 RX ADMIN — IPRATROPIUM BROMIDE 2 SPRAY: 21 SPRAY NASAL at 08:43

## 2018-01-01 RX ADMIN — LORAZEPAM 1 MG: 2 INJECTION INTRAMUSCULAR; INTRAVENOUS at 17:25

## 2018-01-01 RX ADMIN — LORAZEPAM 0.5 MG: 2 INJECTION INTRAMUSCULAR; INTRAVENOUS at 03:12

## 2018-01-01 RX ADMIN — SODIUM CHLORIDE 2925 ML: 9 INJECTION, SOLUTION INTRAVENOUS at 12:19

## 2018-01-01 RX ADMIN — PROPOFOL 10 MG: 10 INJECTION, EMULSION INTRAVENOUS at 09:01

## 2018-01-01 RX ADMIN — ACETAMINOPHEN 650 MG: 325 TABLET ORAL at 15:28

## 2018-01-01 RX ADMIN — EPINEPHRINE 25 MCG/MIN: 1 INJECTION, SOLUTION, CONCENTRATE INTRAVENOUS at 11:34

## 2018-01-01 RX ADMIN — NYSTATIN 500000 UNITS: 500000 SUSPENSION ORAL at 17:34

## 2018-01-01 RX ADMIN — PIPERACILLIN SODIUM,TAZOBACTAM SODIUM 3.38 G: 3; .375 INJECTION, POWDER, FOR SOLUTION INTRAVENOUS at 10:30

## 2018-01-01 RX ADMIN — DEXTROSE MONOHYDRATE 25 G: 25 INJECTION, SOLUTION INTRAVENOUS at 15:17

## 2018-01-01 RX ADMIN — ATORVASTATIN CALCIUM 40 MG: 40 TABLET, FILM COATED ORAL at 22:26

## 2018-01-01 RX ADMIN — FLUOXETINE HYDROCHLORIDE 20 MG: 20 CAPSULE ORAL at 08:50

## 2018-01-01 RX ADMIN — NYSTATIN 500000 UNITS: 500000 SUSPENSION ORAL at 08:59

## 2018-01-01 RX ADMIN — IPRATROPIUM BROMIDE AND ALBUTEROL SULFATE 1 AMPULE: 2.5; .5 SOLUTION RESPIRATORY (INHALATION) at 13:41

## 2018-01-01 RX ADMIN — POTASSIUM CHLORIDE 40 MEQ: 20 SOLUTION ORAL at 08:44

## 2018-01-01 RX ADMIN — VANCOMYCIN HYDROCHLORIDE 1250 MG: 5 INJECTION, POWDER, LYOPHILIZED, FOR SOLUTION INTRAVENOUS at 21:09

## 2018-01-01 RX ADMIN — LORAZEPAM 0.5 MG: 0.5 TABLET ORAL at 08:08

## 2018-01-01 RX ADMIN — Medication 10 ML: at 08:05

## 2018-01-01 RX ADMIN — Medication 10 ML: at 00:06

## 2018-01-01 RX ADMIN — Medication 10 ML: at 09:45

## 2018-01-01 RX ADMIN — POTASSIUM CHLORIDE 40 MEQ: 20 SOLUTION ORAL at 09:40

## 2018-01-01 RX ADMIN — FUROSEMIDE 40 MG: 10 INJECTION, SOLUTION INTRAMUSCULAR; INTRAVENOUS at 17:31

## 2018-01-01 RX ADMIN — LORAZEPAM 1 MG: 2 INJECTION INTRAMUSCULAR; INTRAVENOUS at 12:20

## 2018-01-01 RX ADMIN — Medication 10 MEQ: at 07:50

## 2018-01-01 RX ADMIN — IPRATROPIUM BROMIDE AND ALBUTEROL SULFATE 1 AMPULE: .5; 3 SOLUTION RESPIRATORY (INHALATION) at 14:13

## 2018-01-01 RX ADMIN — IPRATROPIUM BROMIDE AND ALBUTEROL SULFATE 1 AMPULE: .5; 3 SOLUTION RESPIRATORY (INHALATION) at 19:14

## 2018-01-01 RX ADMIN — ALBUMIN (HUMAN) 50 G: 0.25 INJECTION, SOLUTION INTRAVENOUS at 14:10

## 2018-01-01 RX ADMIN — PROPOFOL 20 MG: 10 INJECTION, EMULSION INTRAVENOUS at 16:55

## 2018-01-01 RX ADMIN — MIDAZOLAM HYDROCHLORIDE 4 MG: 1 INJECTION, SOLUTION INTRAMUSCULAR; INTRAVENOUS at 19:41

## 2018-01-01 RX ADMIN — Medication 10 ML: at 11:03

## 2018-01-01 RX ADMIN — SODIUM CHLORIDE 1.5 G: 900 INJECTION INTRAVENOUS at 23:54

## 2018-01-01 RX ADMIN — METHYLPREDNISOLONE SODIUM SUCCINATE 125 MG: 125 INJECTION, POWDER, FOR SOLUTION INTRAMUSCULAR; INTRAVENOUS at 17:25

## 2018-01-01 RX ADMIN — PIPERACILLIN SODIUM,TAZOBACTAM SODIUM 3.38 G: 3; .375 INJECTION, POWDER, FOR SOLUTION INTRAVENOUS at 10:57

## 2018-01-01 RX ADMIN — IPRATROPIUM BROMIDE AND ALBUTEROL SULFATE 1 AMPULE: .5; 3 SOLUTION RESPIRATORY (INHALATION) at 07:42

## 2018-01-01 RX ADMIN — Medication 10 ML: at 11:09

## 2018-01-01 RX ADMIN — SODIUM CHLORIDE: 9 INJECTION, SOLUTION INTRAVENOUS at 18:00

## 2018-01-01 RX ADMIN — IPRATROPIUM BROMIDE AND ALBUTEROL SULFATE 1 AMPULE: 2.5; .5 SOLUTION RESPIRATORY (INHALATION) at 22:04

## 2018-01-01 RX ADMIN — IPRATROPIUM BROMIDE AND ALBUTEROL SULFATE 1 AMPULE: 2.5; .5 SOLUTION RESPIRATORY (INHALATION) at 04:01

## 2018-01-01 RX ADMIN — IPRATROPIUM BROMIDE AND ALBUTEROL SULFATE 1 AMPULE: 2.5; .5 SOLUTION RESPIRATORY (INHALATION) at 11:27

## 2018-01-01 RX ADMIN — VANCOMYCIN HYDROCHLORIDE 1250 MG: 5 INJECTION, POWDER, LYOPHILIZED, FOR SOLUTION INTRAVENOUS at 11:09

## 2018-01-01 RX ADMIN — Medication 10 ML: at 21:56

## 2018-01-01 RX ADMIN — IPRATROPIUM BROMIDE AND ALBUTEROL SULFATE 1 AMPULE: 2.5; .5 SOLUTION RESPIRATORY (INHALATION) at 20:01

## 2018-01-01 RX ADMIN — CARBIDOPA AND LEVODOPA 1 TABLET: 25; 100 TABLET ORAL at 17:28

## 2018-01-01 RX ADMIN — NYSTATIN 500000 UNITS: 500000 SUSPENSION ORAL at 21:14

## 2018-01-01 RX ADMIN — POTASSIUM CHLORIDE 40 MEQ: 20 SOLUTION ORAL at 08:43

## 2018-01-01 RX ADMIN — SPIRONOLACTONE 12.5 MG: 25 TABLET ORAL at 11:38

## 2018-01-01 RX ADMIN — NITROGLYCERIN 0.5 INCH: 20 OINTMENT TOPICAL at 15:52

## 2018-01-01 RX ADMIN — POTASSIUM CHLORIDE 40 MEQ: 20 SOLUTION ORAL at 08:49

## 2018-01-01 RX ADMIN — NYSTATIN 500000 UNITS: 500000 SUSPENSION ORAL at 08:43

## 2018-01-01 RX ADMIN — PIPERACILLIN SODIUM,TAZOBACTAM SODIUM 3.38 G: 3; .375 INJECTION, POWDER, FOR SOLUTION INTRAVENOUS at 11:04

## 2018-01-01 RX ADMIN — OXYCODONE AND ACETAMINOPHEN 1 TABLET: 5; 325 TABLET ORAL at 15:51

## 2018-01-01 RX ADMIN — Medication 10 ML: at 21:14

## 2018-01-01 RX ADMIN — ENOXAPARIN SODIUM 30 MG: 30 INJECTION SUBCUTANEOUS at 21:50

## 2018-01-01 RX ADMIN — PIPERACILLIN SODIUM,TAZOBACTAM SODIUM 3.38 G: 3; .375 INJECTION, POWDER, FOR SOLUTION INTRAVENOUS at 03:40

## 2018-01-01 RX ADMIN — FAMOTIDINE 20 MG: 20 TABLET ORAL at 08:44

## 2018-01-01 RX ADMIN — CARBIDOPA AND LEVODOPA 1 TABLET: 25; 100 TABLET ORAL at 09:13

## 2018-01-01 RX ADMIN — SODIUM CHLORIDE 250 ML: 9 INJECTION, SOLUTION INTRAVENOUS at 17:00

## 2018-01-01 RX ADMIN — ENOXAPARIN SODIUM 30 MG: 30 INJECTION SUBCUTANEOUS at 10:13

## 2018-01-01 RX ADMIN — PIPERACILLIN SODIUM,TAZOBACTAM SODIUM 3.38 G: 3; .375 INJECTION, POWDER, FOR SOLUTION INTRAVENOUS at 18:28

## 2018-01-01 RX ADMIN — MELATONIN TAB 3 MG 3 MG: 3 TAB at 20:18

## 2018-01-01 RX ADMIN — PIPERACILLIN SODIUM,TAZOBACTAM SODIUM 3.38 G: 3; .375 INJECTION, POWDER, FOR SOLUTION INTRAVENOUS at 06:15

## 2018-01-01 RX ADMIN — PROPOFOL 50 MG: 10 INJECTION, EMULSION INTRAVENOUS at 08:59

## 2018-01-01 RX ADMIN — FLUOXETINE HYDROCHLORIDE 20 MG: 20 CAPSULE ORAL at 08:05

## 2018-01-01 RX ADMIN — VANCOMYCIN HYDROCHLORIDE 1250 MG: 5 INJECTION, POWDER, LYOPHILIZED, FOR SOLUTION INTRAVENOUS at 22:00

## 2018-01-01 RX ADMIN — FAMOTIDINE 20 MG: 20 TABLET ORAL at 11:37

## 2018-01-01 RX ADMIN — Medication 10 ML: at 22:36

## 2018-01-01 RX ADMIN — PIPERACILLIN SODIUM,TAZOBACTAM SODIUM 3.38 G: 3; .375 INJECTION, POWDER, FOR SOLUTION INTRAVENOUS at 02:17

## 2018-01-01 RX ADMIN — VANCOMYCIN HYDROCHLORIDE 500 MG: 500 INJECTION, SOLUTION INTRAVENOUS at 18:00

## 2018-01-01 RX ADMIN — ASPIRIN 81 MG: 81 TABLET, CHEWABLE ORAL at 08:50

## 2018-01-01 RX ADMIN — FAMOTIDINE 20 MG: 20 TABLET ORAL at 22:26

## 2018-01-01 RX ADMIN — Medication 10 ML: at 08:53

## 2018-01-01 RX ADMIN — FAMOTIDINE 20 MG: 20 TABLET ORAL at 08:43

## 2018-01-01 RX ADMIN — INSULIN LISPRO 1 UNITS: 100 INJECTION, SOLUTION INTRAVENOUS; SUBCUTANEOUS at 23:19

## 2018-01-01 RX ADMIN — IPRATROPIUM BROMIDE 2 SPRAY: 21 SPRAY NASAL at 21:38

## 2018-01-01 RX ADMIN — FAMOTIDINE 20 MG: 20 TABLET ORAL at 21:21

## 2018-01-01 RX ADMIN — LISINOPRIL 2.5 MG: 2.5 TABLET ORAL at 11:37

## 2018-01-01 RX ADMIN — Medication 10 ML: at 21:51

## 2018-01-01 RX ADMIN — IPRATROPIUM BROMIDE AND ALBUTEROL SULFATE 1 AMPULE: 2.5; .5 SOLUTION RESPIRATORY (INHALATION) at 16:22

## 2018-01-01 RX ADMIN — IPRATROPIUM BROMIDE AND ALBUTEROL SULFATE 1 AMPULE: .5; 3 SOLUTION RESPIRATORY (INHALATION) at 11:55

## 2018-01-01 RX ADMIN — IPRATROPIUM BROMIDE AND ALBUTEROL SULFATE 1 AMPULE: 2.5; .5 SOLUTION RESPIRATORY (INHALATION) at 20:03

## 2018-01-01 RX ADMIN — MAGNESIUM HYDROXIDE 30 ML: 400 SUSPENSION ORAL at 08:59

## 2018-01-01 RX ADMIN — SODIUM CHLORIDE: 9 INJECTION, SOLUTION INTRAVENOUS at 20:43

## 2018-01-01 RX ADMIN — PIPERACILLIN SODIUM,TAZOBACTAM SODIUM 3.38 G: 3; .375 INJECTION, POWDER, FOR SOLUTION INTRAVENOUS at 18:03

## 2018-01-01 RX ADMIN — PIPERACILLIN SODIUM,TAZOBACTAM SODIUM 3.38 G: 3; .375 INJECTION, POWDER, FOR SOLUTION INTRAVENOUS at 18:52

## 2018-01-01 RX ADMIN — FAMOTIDINE 20 MG: 20 TABLET ORAL at 21:06

## 2018-01-01 RX ADMIN — BUDESONIDE 500 MCG: 0.5 SUSPENSION RESPIRATORY (INHALATION) at 03:51

## 2018-01-01 RX ADMIN — BUDESONIDE 500 MCG: 0.5 SUSPENSION RESPIRATORY (INHALATION) at 19:59

## 2018-01-01 RX ADMIN — BUDESONIDE 500 MCG: 0.5 SUSPENSION RESPIRATORY (INHALATION) at 04:16

## 2018-01-01 RX ADMIN — AMIODARONE HYDROCHLORIDE 200 MG: 200 TABLET ORAL at 08:05

## 2018-01-01 RX ADMIN — IPRATROPIUM BROMIDE 2 SPRAY: 21 SPRAY NASAL at 11:16

## 2018-01-01 RX ADMIN — ACETAMINOPHEN 1000 MG: 500 TABLET ORAL at 00:13

## 2018-01-01 RX ADMIN — AMIODARONE HYDROCHLORIDE 200 MG: 200 TABLET ORAL at 08:07

## 2018-01-01 RX ADMIN — Medication 10 ML: at 17:32

## 2018-01-01 RX ADMIN — NYSTATIN 500000 UNITS: 500000 SUSPENSION ORAL at 20:42

## 2018-01-01 RX ADMIN — FAMOTIDINE 20 MG: 10 INJECTION, SOLUTION INTRAVENOUS at 08:07

## 2018-01-01 RX ADMIN — EPINEPHRINE 20 MCG/MIN: 1 INJECTION, SOLUTION, CONCENTRATE INTRAVENOUS at 05:21

## 2018-01-01 RX ADMIN — BARIUM SULFATE 50 ML: 0.81 POWDER, FOR SUSPENSION ORAL at 14:18

## 2018-01-01 RX ADMIN — IPRATROPIUM BROMIDE AND ALBUTEROL SULFATE 1 AMPULE: 2.5; .5 SOLUTION RESPIRATORY (INHALATION) at 03:51

## 2018-01-01 RX ADMIN — GLUCAGON HYDROCHLORIDE 1 MG: 1 INJECTION, POWDER, FOR SOLUTION INTRAMUSCULAR; INTRAVENOUS; SUBCUTANEOUS at 00:22

## 2018-01-01 RX ADMIN — BUDESONIDE 500 MCG: 0.5 SUSPENSION RESPIRATORY (INHALATION) at 04:55

## 2018-01-01 RX ADMIN — BUDESONIDE 500 MCG: 0.5 SUSPENSION RESPIRATORY (INHALATION) at 19:27

## 2018-01-01 RX ADMIN — Medication 1000 ML: at 02:09

## 2018-01-01 RX ADMIN — FAMOTIDINE 20 MG: 20 TABLET ORAL at 08:50

## 2018-01-01 RX ADMIN — CARBIDOPA AND LEVODOPA 1 TABLET: 25; 100 TABLET ORAL at 20:18

## 2018-01-01 RX ADMIN — Medication 10 ML: at 08:45

## 2018-01-01 RX ADMIN — BUDESONIDE 500 MCG: 0.5 SUSPENSION RESPIRATORY (INHALATION) at 19:52

## 2018-01-01 RX ADMIN — SODIUM CHLORIDE 1000 ML: 9 INJECTION, SOLUTION INTRAVENOUS at 23:24

## 2018-01-01 RX ADMIN — PROPOFOL 10 MG: 10 INJECTION, EMULSION INTRAVENOUS at 17:04

## 2018-01-01 RX ADMIN — FAMOTIDINE 20 MG: 20 TABLET ORAL at 08:59

## 2018-01-01 RX ADMIN — PIPERACILLIN SODIUM,TAZOBACTAM SODIUM 3.38 G: 3; .375 INJECTION, POWDER, FOR SOLUTION INTRAVENOUS at 21:37

## 2018-01-01 RX ADMIN — INSULIN LISPRO 1 UNITS: 100 INJECTION, SOLUTION INTRAVENOUS; SUBCUTANEOUS at 04:11

## 2018-01-01 RX ADMIN — PIPERACILLIN SODIUM,TAZOBACTAM SODIUM 3.38 G: 3; .375 INJECTION, POWDER, FOR SOLUTION INTRAVENOUS at 18:25

## 2018-01-01 RX ADMIN — FAMOTIDINE 20 MG: 20 TABLET ORAL at 08:05

## 2018-01-01 RX ADMIN — PIPERACILLIN SODIUM,TAZOBACTAM SODIUM 3.38 G: 3; .375 INJECTION, POWDER, FOR SOLUTION INTRAVENOUS at 02:30

## 2018-01-01 RX ADMIN — CARVEDILOL 12.5 MG: 12.5 TABLET, FILM COATED ORAL at 11:37

## 2018-01-01 RX ADMIN — IPRATROPIUM BROMIDE 2 SPRAY: 21 SPRAY NASAL at 12:15

## 2018-01-01 RX ADMIN — FAMOTIDINE 20 MG: 20 TABLET ORAL at 11:03

## 2018-01-01 RX ADMIN — FAMOTIDINE 20 MG: 10 INJECTION, SOLUTION INTRAVENOUS at 16:12

## 2018-01-01 RX ADMIN — IPRATROPIUM BROMIDE AND ALBUTEROL SULFATE 1 AMPULE: 2.5; .5 SOLUTION RESPIRATORY (INHALATION) at 04:16

## 2018-01-01 RX ADMIN — FAMOTIDINE 20 MG: 20 TABLET ORAL at 20:10

## 2018-01-01 RX ADMIN — MELATONIN TAB 3 MG 3 MG: 3 TAB at 21:21

## 2018-01-01 RX ADMIN — SODIUM CHLORIDE, POTASSIUM CHLORIDE, SODIUM LACTATE AND CALCIUM CHLORIDE: 600; 310; 30; 20 INJECTION, SOLUTION INTRAVENOUS at 08:35

## 2018-01-01 RX ADMIN — SODIUM CHLORIDE 1000 ML: 9 INJECTION, SOLUTION INTRAVENOUS at 20:30

## 2018-01-01 RX ADMIN — CARVEDILOL 12.5 MG: 12.5 TABLET, FILM COATED ORAL at 21:21

## 2018-01-01 RX ADMIN — FAMOTIDINE 20 MG: 20 TABLET ORAL at 21:14

## 2018-01-01 RX ADMIN — SPIRONOLACTONE 12.5 MG: 25 TABLET ORAL at 09:14

## 2018-01-01 RX ADMIN — DIGOXIN 125 MCG: 125 TABLET ORAL at 11:38

## 2018-01-01 RX ADMIN — Medication 10 ML: at 22:35

## 2018-01-01 RX ADMIN — CARBIDOPA AND LEVODOPA 1 TABLET: 25; 100 TABLET ORAL at 08:51

## 2018-01-01 RX ADMIN — FLUOXETINE HYDROCHLORIDE 20 MG: 20 CAPSULE ORAL at 08:44

## 2018-01-01 RX ADMIN — BUDESONIDE 500 MCG: 0.5 SUSPENSION RESPIRATORY (INHALATION) at 07:42

## 2018-01-01 RX ADMIN — BUDESONIDE 500 MCG: 0.5 SUSPENSION RESPIRATORY (INHALATION) at 19:23

## 2018-01-01 RX ADMIN — IPRATROPIUM BROMIDE AND ALBUTEROL SULFATE 1 AMPULE: 2.5; .5 SOLUTION RESPIRATORY (INHALATION) at 09:32

## 2018-01-01 RX ADMIN — ALBUTEROL SULFATE 2.5 MG: 2.5 SOLUTION RESPIRATORY (INHALATION) at 18:00

## 2018-01-01 RX ADMIN — SODIUM CHLORIDE: 9 INJECTION, SOLUTION INTRAVENOUS at 16:12

## 2018-01-01 RX ADMIN — CARBIDOPA AND LEVODOPA 1 TABLET: 25; 100 TABLET ORAL at 16:08

## 2018-01-01 RX ADMIN — Medication 10 ML: at 21:24

## 2018-01-01 RX ADMIN — MELATONIN TAB 3 MG 3 MG: 3 TAB at 20:10

## 2018-01-01 RX ADMIN — SUCRALFATE 1 G: 1 TABLET ORAL at 16:17

## 2018-01-01 RX ADMIN — PROPOFOL 10 MCG/KG/MIN: 10 INJECTION, EMULSION INTRAVENOUS at 10:12

## 2018-01-01 RX ADMIN — HYDRALAZINE HYDROCHLORIDE 10 MG: 20 INJECTION INTRAMUSCULAR; INTRAVENOUS at 21:50

## 2018-01-01 RX ADMIN — BUDESONIDE 500 MCG: 0.5 SUSPENSION RESPIRATORY (INHALATION) at 08:18

## 2018-01-01 RX ADMIN — Medication 10 MEQ: at 08:53

## 2018-01-01 RX ADMIN — CARBIDOPA AND LEVODOPA 1 TABLET: 25; 100 TABLET ORAL at 08:59

## 2018-01-01 RX ADMIN — IPRATROPIUM BROMIDE AND ALBUTEROL SULFATE 1 AMPULE: 2.5; .5 SOLUTION RESPIRATORY (INHALATION) at 04:30

## 2018-01-01 RX ADMIN — SODIUM CHLORIDE: 9 INJECTION, SOLUTION INTRAVENOUS at 20:10

## 2018-01-01 RX ADMIN — ATORVASTATIN CALCIUM 40 MG: 40 TABLET, FILM COATED ORAL at 20:37

## 2018-01-01 RX ADMIN — CARBIDOPA AND LEVODOPA 1 TABLET: 25; 100 TABLET ORAL at 09:40

## 2018-01-01 RX ADMIN — PROPOFOL 20 MG: 10 INJECTION, EMULSION INTRAVENOUS at 16:58

## 2018-01-01 RX ADMIN — IPRATROPIUM BROMIDE AND ALBUTEROL SULFATE 1 AMPULE: 2.5; .5 SOLUTION RESPIRATORY (INHALATION) at 09:13

## 2018-01-01 RX ADMIN — IBUPROFEN 400 MG: 400 TABLET, FILM COATED ORAL at 00:13

## 2018-01-01 RX ADMIN — SODIUM CHLORIDE 1.5 G: 900 INJECTION INTRAVENOUS at 11:31

## 2018-01-01 RX ADMIN — CARBIDOPA AND LEVODOPA 1 TABLET: 25; 100 TABLET ORAL at 08:08

## 2018-01-01 RX ADMIN — CARBIDOPA AND LEVODOPA 1 TABLET: 25; 100 TABLET ORAL at 18:09

## 2018-01-01 RX ADMIN — VANCOMYCIN 1000 MG: 1 INJECTION, SOLUTION INTRAVENOUS at 14:43

## 2018-01-01 RX ADMIN — ALBUMIN (HUMAN) 50 G: 0.25 INJECTION, SOLUTION INTRAVENOUS at 20:57

## 2018-01-01 RX ADMIN — OXYCODONE AND ACETAMINOPHEN 1 TABLET: 5; 325 TABLET ORAL at 23:36

## 2018-01-01 RX ADMIN — NYSTATIN 500000 UNITS: 500000 SUSPENSION ORAL at 14:12

## 2018-01-01 RX ADMIN — VANCOMYCIN HYDROCHLORIDE 1250 MG: 5 INJECTION, POWDER, LYOPHILIZED, FOR SOLUTION INTRAVENOUS at 12:00

## 2018-01-01 RX ADMIN — Medication 10 ML: at 21:07

## 2018-01-01 RX ADMIN — PIPERACILLIN SODIUM,TAZOBACTAM SODIUM 3.38 G: 3; .375 INJECTION, POWDER, FOR SOLUTION INTRAVENOUS at 14:43

## 2018-01-01 RX ADMIN — PROPOFOL 10 MCG/KG/MIN: 10 INJECTION, EMULSION INTRAVENOUS at 19:45

## 2018-01-01 RX ADMIN — IPRATROPIUM BROMIDE AND ALBUTEROL SULFATE 1 AMPULE: .5; 3 SOLUTION RESPIRATORY (INHALATION) at 19:07

## 2018-01-01 RX ADMIN — TORSEMIDE 20 MG: 20 TABLET ORAL at 11:38

## 2018-01-01 RX ADMIN — LORAZEPAM 0.5 MG: 2 INJECTION INTRAMUSCULAR; INTRAVENOUS at 00:48

## 2018-01-01 RX ADMIN — Medication 10 ML: at 09:00

## 2018-01-01 RX ADMIN — CARBIDOPA AND LEVODOPA 1 TABLET: 25; 100 TABLET ORAL at 18:11

## 2018-01-01 RX ADMIN — NOREPINEPHRINE BITARTRATE 5 MCG: 1 INJECTION INTRAVENOUS at 18:18

## 2018-01-01 RX ADMIN — CARBIDOPA AND LEVODOPA 1 TABLET: 25; 100 TABLET ORAL at 17:39

## 2018-01-01 RX ADMIN — Medication 10 ML: at 22:27

## 2018-01-01 RX ADMIN — WATER 10 ML: 1 INJECTION INTRAMUSCULAR; INTRAVENOUS; SUBCUTANEOUS at 00:22

## 2018-01-01 RX ADMIN — FLUOXETINE HYDROCHLORIDE 20 MG: 20 CAPSULE ORAL at 08:43

## 2018-01-01 RX ADMIN — SODIUM CHLORIDE: 9 INJECTION, SOLUTION INTRAVENOUS at 06:17

## 2018-01-01 RX ADMIN — DIGOXIN 125 MCG: 125 TABLET ORAL at 09:12

## 2018-01-01 RX ADMIN — ATORVASTATIN CALCIUM 40 MG: 40 TABLET, FILM COATED ORAL at 21:50

## 2018-01-01 RX ADMIN — Medication 10 MEQ: at 10:03

## 2018-01-01 RX ADMIN — ASPIRIN 81 MG: 81 TABLET, CHEWABLE ORAL at 10:44

## 2018-01-01 RX ADMIN — SODIUM POLYSTYRENE SULFONATE 30 G: 15 SUSPENSION ORAL; RECTAL at 10:14

## 2018-01-01 RX ADMIN — SODIUM CHLORIDE 1.5 G: 900 INJECTION INTRAVENOUS at 05:56

## 2018-01-01 RX ADMIN — FUROSEMIDE 40 MG: 10 INJECTION, SOLUTION INTRAMUSCULAR; INTRAVENOUS at 11:30

## 2018-01-01 RX ADMIN — LORAZEPAM 0.5 MG: 0.5 TABLET ORAL at 09:24

## 2018-01-01 RX ADMIN — POTASSIUM CHLORIDE 40 MEQ: 20 SOLUTION ORAL at 09:00

## 2018-01-01 RX ADMIN — VANCOMYCIN HYDROCHLORIDE 1250 MG: 5 INJECTION, POWDER, LYOPHILIZED, FOR SOLUTION INTRAVENOUS at 22:55

## 2018-01-01 RX ADMIN — ATORVASTATIN CALCIUM 40 MG: 40 TABLET, FILM COATED ORAL at 21:06

## 2018-01-01 RX ADMIN — ASPIRIN 81 MG: 81 TABLET, CHEWABLE ORAL at 09:13

## 2018-01-01 RX ADMIN — ATORVASTATIN CALCIUM 40 MG: 40 TABLET, FILM COATED ORAL at 21:21

## 2018-01-01 RX ADMIN — BUDESONIDE 500 MCG: 0.5 SUSPENSION RESPIRATORY (INHALATION) at 09:13

## 2018-01-01 RX ADMIN — VANCOMYCIN HYDROCHLORIDE 1250 MG: 5 INJECTION, POWDER, LYOPHILIZED, FOR SOLUTION INTRAVENOUS at 08:49

## 2018-01-01 RX ADMIN — Medication 10 ML: at 21:09

## 2018-01-01 RX ADMIN — IPRATROPIUM BROMIDE AND ALBUTEROL SULFATE 1 AMPULE: .5; 3 SOLUTION RESPIRATORY (INHALATION) at 07:12

## 2018-01-01 RX ADMIN — ATORVASTATIN CALCIUM 40 MG: 40 TABLET, FILM COATED ORAL at 20:18

## 2018-01-01 RX ADMIN — DIGOXIN 125 MCG: 125 TABLET ORAL at 08:51

## 2018-01-01 RX ADMIN — IPRATROPIUM BROMIDE AND ALBUTEROL SULFATE 1 AMPULE: 2.5; .5 SOLUTION RESPIRATORY (INHALATION) at 15:29

## 2018-01-01 RX ADMIN — ALBUTEROL SULFATE 2.5 MG: 2.5 SOLUTION RESPIRATORY (INHALATION) at 18:05

## 2018-01-01 RX ADMIN — ASPIRIN 81 MG: 81 TABLET, CHEWABLE ORAL at 08:44

## 2018-01-01 RX ADMIN — OXYCODONE AND ACETAMINOPHEN 1 TABLET: 5; 325 TABLET ORAL at 21:53

## 2018-01-01 RX ADMIN — NYSTATIN 500000 UNITS: 500000 SUSPENSION ORAL at 13:57

## 2018-01-01 RX ADMIN — IPRATROPIUM BROMIDE AND ALBUTEROL SULFATE 1 AMPULE: 2.5; .5 SOLUTION RESPIRATORY (INHALATION) at 19:27

## 2018-01-01 RX ADMIN — IPRATROPIUM BROMIDE AND ALBUTEROL SULFATE 1 AMPULE: 2.5; .5 SOLUTION RESPIRATORY (INHALATION) at 19:52

## 2018-01-01 RX ADMIN — NYSTATIN 500000 UNITS: 500000 SUSPENSION ORAL at 18:06

## 2018-01-01 RX ADMIN — FLUOXETINE HYDROCHLORIDE 20 MG: 20 CAPSULE ORAL at 08:59

## 2018-01-01 RX ADMIN — SODIUM CHLORIDE 1000 ML: 9 INJECTION, SOLUTION INTRAVENOUS at 17:25

## 2018-01-01 RX ADMIN — IPRATROPIUM BROMIDE AND ALBUTEROL SULFATE 1 AMPULE: 2.5; .5 SOLUTION RESPIRATORY (INHALATION) at 19:59

## 2018-01-01 RX ADMIN — VANCOMYCIN HYDROCHLORIDE 1250 MG: 5 INJECTION, POWDER, LYOPHILIZED, FOR SOLUTION INTRAVENOUS at 22:57

## 2018-01-01 RX ADMIN — AZITHROMYCIN 500 MG: 500 TABLET, FILM COATED ORAL at 18:39

## 2018-01-01 RX ADMIN — ATORVASTATIN CALCIUM 40 MG: 40 TABLET, FILM COATED ORAL at 21:09

## 2018-01-01 RX ADMIN — FAMOTIDINE 20 MG: 20 TABLET ORAL at 20:37

## 2018-01-01 RX ADMIN — VANCOMYCIN HYDROCHLORIDE 1500 MG: 5 INJECTION, POWDER, LYOPHILIZED, FOR SOLUTION INTRAVENOUS at 21:00

## 2018-01-01 RX ADMIN — SPIRONOLACTONE 12.5 MG: 25 TABLET ORAL at 09:00

## 2018-01-01 RX ADMIN — PIPERACILLIN SODIUM,TAZOBACTAM SODIUM 3.38 G: 3; .375 INJECTION, POWDER, FOR SOLUTION INTRAVENOUS at 03:12

## 2018-01-01 RX ADMIN — SODIUM CHLORIDE 1.5 G: 900 INJECTION INTRAVENOUS at 11:48

## 2018-01-01 RX ADMIN — FAMOTIDINE 20 MG: 20 TABLET ORAL at 09:13

## 2018-01-01 RX ADMIN — AMIODARONE HYDROCHLORIDE 200 MG: 200 TABLET ORAL at 09:13

## 2018-01-01 RX ADMIN — Medication 2925 ML: at 12:19

## 2018-01-01 RX ADMIN — TORSEMIDE 20 MG: 20 TABLET ORAL at 09:12

## 2018-01-01 RX ADMIN — SODIUM CHLORIDE: 9 INJECTION, SOLUTION INTRAVENOUS at 03:07

## 2018-01-01 RX ADMIN — POTASSIUM CHLORIDE 40 MEQ: 20 SOLUTION ORAL at 08:07

## 2018-01-01 RX ADMIN — NYSTATIN 500000 UNITS: 500000 SUSPENSION ORAL at 16:35

## 2018-01-01 ASSESSMENT — ENCOUNTER SYMPTOMS
COUGH: 1
BLOOD IN STOOL: 0
ABDOMINAL PAIN: 0
ABDOMINAL PAIN: 0
VOMITING: 0
EYE DISCHARGE: 0
COLOR CHANGE: 0
DIARRHEA: 0
TROUBLE SWALLOWING: 1
VOMITING: 0
SORE THROAT: 0
CHEST TIGHTNESS: 0
SHORTNESS OF BREATH: 0
VOMITING: 0
DIARRHEA: 0
VOMITING: 0
ABDOMINAL DISTENTION: 0
NAUSEA: 0
DIARRHEA: 0
DIARRHEA: 0
VOMITING: 0
SHORTNESS OF BREATH: 0
TROUBLE SWALLOWING: 0
ABDOMINAL PAIN: 0
COLOR CHANGE: 0
ABDOMINAL PAIN: 0
EYE PAIN: 0
NAUSEA: 0
ABDOMINAL PAIN: 0
NAUSEA: 0
WHEEZING: 0
COUGH: 0
EYES NEGATIVE: 1
ABDOMINAL PAIN: 0
PHOTOPHOBIA: 0
NAUSEA: 0
VOMITING: 0
WHEEZING: 0
SHORTNESS OF BREATH: 0
APNEA: 0
DIARRHEA: 0
SORE THROAT: 0
RHINORRHEA: 0
CONSTIPATION: 0
SHORTNESS OF BREATH: 1
CONSTIPATION: 0
COLOR CHANGE: 0
EYE DISCHARGE: 0
SHORTNESS OF BREATH: 0
EYE ITCHING: 0
EYE DISCHARGE: 0
NAUSEA: 0
EYE PAIN: 0
BACK PAIN: 1
RHINORRHEA: 0
ABDOMINAL PAIN: 0
BACK PAIN: 0
COLOR CHANGE: 0
DIARRHEA: 0
BACK PAIN: 0
COUGH: 0
CONSTIPATION: 0
RHINORRHEA: 0
VOMITING: 0
COUGH: 0
SHORTNESS OF BREATH: 1
ABDOMINAL PAIN: 0
SHORTNESS OF BREATH: 1
ABDOMINAL DISTENTION: 0
SORE THROAT: 0
CHOKING: 1
COUGH: 0
NAUSEA: 0
ALLERGIC/IMMUNOLOGIC NEGATIVE: 1
COUGH: 0
SORE THROAT: 0
SHORTNESS OF BREATH: 0
RHINORRHEA: 1
DIARRHEA: 0
ABDOMINAL DISTENTION: 0
COUGH: 1
BACK PAIN: 0
ABDOMINAL PAIN: 0
COLOR CHANGE: 0
SHORTNESS OF BREATH: 1
NAUSEA: 0
SHORTNESS OF BREATH: 0
RHINORRHEA: 0
VOICE CHANGE: 0
VOMITING: 0
RHINORRHEA: 0
SHORTNESS OF BREATH: 0
SORE THROAT: 0
BACK PAIN: 0
BACK PAIN: 0
SORE THROAT: 0
BACK PAIN: 0
CHEST TIGHTNESS: 0
DIARRHEA: 0
ABDOMINAL PAIN: 1
NAUSEA: 0
SHORTNESS OF BREATH: 0
CONSTIPATION: 0

## 2018-01-01 ASSESSMENT — PAIN SCALES - GENERAL
PAINLEVEL_OUTOF10: 0
PAINLEVEL_OUTOF10: 8
PAINLEVEL_OUTOF10: 8
PAINLEVEL_OUTOF10: 0
PAINLEVEL_OUTOF10: 9
PAINLEVEL_OUTOF10: 0
PAINLEVEL_OUTOF10: 2
PAINLEVEL_OUTOF10: 0
PAINLEVEL_OUTOF10: 9
PAINLEVEL_OUTOF10: 0
PAINLEVEL_OUTOF10: 7
PAINLEVEL_OUTOF10: 3
PAINLEVEL_OUTOF10: 0
PAINLEVEL_OUTOF10: 0
PAINLEVEL_OUTOF10: 9
PAINLEVEL_OUTOF10: 0
PAINLEVEL_OUTOF10: 0
PAINLEVEL_OUTOF10: 6
PAINLEVEL_OUTOF10: 9
PAINLEVEL_OUTOF10: 0
PAINLEVEL_OUTOF10: 0
PAINLEVEL_OUTOF10: 9
PAINLEVEL_OUTOF10: 0
PAINLEVEL_OUTOF10: 8
PAINLEVEL_OUTOF10: 0
PAINLEVEL_OUTOF10: 0
PAINLEVEL_OUTOF10: 6
PAINLEVEL_OUTOF10: 0
PAINLEVEL_OUTOF10: 8
PAINLEVEL_OUTOF10: 0
PAINLEVEL_OUTOF10: 8
PAINLEVEL_OUTOF10: 0
PAINLEVEL_OUTOF10: 0
PAINLEVEL_OUTOF10: 3
PAINLEVEL_OUTOF10: 0
PAINLEVEL_OUTOF10: 10
PAINLEVEL_OUTOF10: 3
PAINLEVEL_OUTOF10: 8
PAINLEVEL_OUTOF10: 0
PAINLEVEL_OUTOF10: 0
PAINLEVEL_OUTOF10: 10
PAINLEVEL_OUTOF10: 0
PAINLEVEL_OUTOF10: 0
PAINLEVEL_OUTOF10: 4
PAINLEVEL_OUTOF10: 0
PAINLEVEL_OUTOF10: 5
PAINLEVEL_OUTOF10: 8
PAINLEVEL_OUTOF10: 0
PAINLEVEL_OUTOF10: 0
PAINLEVEL_OUTOF10: 8
PAINLEVEL_OUTOF10: 0
PAINLEVEL_OUTOF10: 10
PAINLEVEL_OUTOF10: 0
PAINLEVEL_OUTOF10: 10
PAINLEVEL_OUTOF10: 0
PAINLEVEL_OUTOF10: 0
PAINLEVEL_OUTOF10: 3
PAINLEVEL_OUTOF10: 0
PAINLEVEL_OUTOF10: 0
PAINLEVEL_OUTOF10: 9
PAINLEVEL_OUTOF10: 0
PAINLEVEL_OUTOF10: 0

## 2018-01-01 ASSESSMENT — PULMONARY FUNCTION TESTS
PIF_VALUE: 1
PIF_VALUE: 6.4
PIF_VALUE: 5.09
PIF_VALUE: 18
PIF_VALUE: 1
PIF_VALUE: 5.3
PIF_VALUE: 7.4
PIF_VALUE: 8.4
PIF_VALUE: 0
PIF_VALUE: 1
PIF_VALUE: 0
PIF_VALUE: 1
PIF_VALUE: 1
PIF_VALUE: 9
PIF_VALUE: 1
PIF_VALUE: 9.9
PIF_VALUE: 1
PIF_VALUE: 0
PIF_VALUE: 1
PIF_VALUE: 0
PIF_VALUE: 1
PIF_VALUE: 0
PIF_VALUE: 1
PIF_VALUE: 9.9
PIF_VALUE: 17
PIF_VALUE: 15
PIF_VALUE: 6.8
PIF_VALUE: 30
PIF_VALUE: 17
PIF_VALUE: 8.69
PIF_VALUE: 10
PIF_VALUE: 35
PIF_VALUE: 10
PIF_VALUE: 5.4
PIF_VALUE: 1
PIF_VALUE: 16
PIF_VALUE: 1
PIF_VALUE: 6.6
PIF_VALUE: 5.9
PIF_VALUE: 0
PIF_VALUE: 0
PIF_VALUE: 1
PIF_VALUE: 0
PIF_VALUE: 6.8
PIF_VALUE: 1
PIF_VALUE: 8.3
PIF_VALUE: 0
PIF_VALUE: 11
PIF_VALUE: 1
PIF_VALUE: 0
PIF_VALUE: 16
PIF_VALUE: 1
PIF_VALUE: 8.69
PIF_VALUE: 0
PIF_VALUE: 11
PIF_VALUE: 13
PIF_VALUE: 1
PIF_VALUE: 2
PIF_VALUE: 5.6

## 2018-01-01 ASSESSMENT — PAIN DESCRIPTION - LOCATION
LOCATION: BACK
LOCATION: RIB CAGE
LOCATION: RIB CAGE
LOCATION: ABDOMEN
LOCATION: NECK
LOCATION: BACK
LOCATION: NECK
LOCATION: ABDOMEN
LOCATION: GENERALIZED;RIB CAGE
LOCATION: ABDOMEN
LOCATION: BACK
LOCATION: HIP
LOCATION: RIB CAGE

## 2018-01-01 ASSESSMENT — PAIN DESCRIPTION - PROGRESSION
CLINICAL_PROGRESSION: NOT CHANGED

## 2018-01-01 ASSESSMENT — PAIN DESCRIPTION - ORIENTATION
ORIENTATION: LOWER
ORIENTATION: LEFT
ORIENTATION: RIGHT
ORIENTATION: MID;LOWER
ORIENTATION: LEFT;LOWER
ORIENTATION: RIGHT;LEFT;POSTERIOR
ORIENTATION: LOWER
ORIENTATION: MID
ORIENTATION: LEFT;UPPER
ORIENTATION: LEFT
ORIENTATION: LEFT

## 2018-01-01 ASSESSMENT — PAIN SCALES - WONG BAKER
WONGBAKER_NUMERICALRESPONSE: 2

## 2018-01-01 ASSESSMENT — PAIN DESCRIPTION - PAIN TYPE
TYPE: ACUTE PAIN
TYPE: CHRONIC PAIN
TYPE: CHRONIC PAIN
TYPE: ACUTE PAIN
TYPE: ACUTE PAIN
TYPE: ACUTE PAIN;CHRONIC PAIN
TYPE: ACUTE PAIN
TYPE: CHRONIC PAIN
TYPE: ACUTE PAIN

## 2018-01-01 ASSESSMENT — PAIN DESCRIPTION - ONSET
ONSET: ON-GOING

## 2018-01-01 ASSESSMENT — PAIN DESCRIPTION - FREQUENCY
FREQUENCY: CONTINUOUS
FREQUENCY: INTERMITTENT
FREQUENCY: CONTINUOUS

## 2018-01-01 ASSESSMENT — PAIN DESCRIPTION - DESCRIPTORS
DESCRIPTORS: ACHING
DESCRIPTORS: ACHING
DESCRIPTORS: DISCOMFORT
DESCRIPTORS: ACHING
DESCRIPTORS: ACHING;DISCOMFORT
DESCRIPTORS: TIGHTNESS
DESCRIPTORS: ACHING;DISCOMFORT
DESCRIPTORS: TINGLING

## 2018-01-01 ASSESSMENT — ACTIVITIES OF DAILY LIVING (ADL)
EFFECT OF PAIN ON DAILY ACTIVITIES: COMFORT
EFFECT OF PAIN ON DAILY ACTIVITIES: COMFORT

## 2018-01-01 ASSESSMENT — COPD QUESTIONNAIRES: CAT_SEVERITY: NO INTERVAL CHANGE

## 2018-07-16 PROBLEM — T18.128A FOOD IMPACTION OF ESOPHAGUS: Status: ACTIVE | Noted: 2018-01-01

## 2018-07-16 PROBLEM — R13.10 DYSPHAGIA: Status: ACTIVE | Noted: 2018-01-01
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
## 2018-07-16 NOTE — DISCHARGE SUMMARY
Hospital Medicine Discharge Summary    Lucille Schmidt  :  8/3/1932  MRN:  [id number]    Admit date:  7/15/2018  Discharge date:      Admitting Physician: Familia Monroy MD  Primary Care Physician:  Franklin Peng DO      Discharge Diagnoses: Active Problems:    Dysphagia  Resolved Problems:    * No resolved hospital problems. *      Hospital Course:   Lucille Schmidt is a 80 y.o. male admitted with food impaction after eating large bite of sausage. He was admitted for observation. GI consulted. He had EGD and removal of food bolus. He tolerated diet after the procedure. He was discharged home in a stable condition      Physical Exam:    Vitals: BP 95/61   Pulse 78   Temp 98.1 °F (36.7 °C) (Oral)   Resp 18   Ht 6' 1\" (1.854 m)   Wt 220 lb 10.9 oz (100.1 kg)   SpO2 92%   BMI 29.12 kg/m²   General appearance: alert, appears stated age and cooperative  Lungs: CTA, no wheezes  Heart: S1 S2, systolic murmur  Abdomen: soft, epigastric tenderness, no masses, active jane sounds  Extremities: moves all, no edema  Neurologic: AOX3, bilateral termors more on the Rt side    Active Problems:    Parkinson disease ([address]    Dysphagia    Food impaction of esophagus  Resolved Problems:    * No resolved hospital problems. *      Patient was seen by the following consultants while admitted to [facility]:   Consults:  [address]    Significant Diagnostic Studies:    No results found.     Discharge Medications:       Latia Wu   [place] Medication Instructions DX    Printed on:18   Medication Information                      acetaminophen (TYLENOL) 650 MG extended release tablet  Take 1,300 mg by mouth 2 times daily             albuterol sulfate HFA (VENTOLIN HFA) 108 (90 Base) MCG/ACT inhaler  Inhale 2 puffs into the lungs every 6 hours as needed for Wheezing             ALPRAZolam (XANAX) 0.25 MG tablet               amiodarone (CORDARONE) 200 MG tablet  Take 100 mg by mouth daily              aspirin (ECOTRIN LOW STRENGTH) 81 MG EC tablet  Take 81 tablets by mouth daily             carvedilol (COREG) 12.5 MG tablet  Take 3.125 mg by mouth 2 times daily              DIGOXIN PO  Take 0.125 mcg by mouth Twice a Week every other day             furosemide (LASIX) 40 MG tablet  Take 40 mg by mouth daily             lisinopril (ZESTRIL) 5 MG tablet  Take 2.5 mg by mouth daily              magnesium oxide (MAG-OX) 400 MG tablet  Take 400 mg by mouth daily             simvastatin (ZOCOR) 40 MG tablet  Take 0.5 tablets by mouth nightly             spironolactone (ALDACTONE) 25 MG tablet  Take 12.5 mg by mouth daily             trifluoperazine (STELAZINE) 2 MG tablet  Take 2 mg by mouth nightly                 Disposition:   Discharged to Home. Any [facility] needs that were indicated and/or required as been addressed and set up by Social Work. Condition at discharge: Pt was medically stable at the time of discharge. Activity: activity as tolerated    Total time taken for discharging this patient: 25 minutes    Signed:  Morena Su  7/16/2018, 7:36 PM  ----------------------------------------------------------------------------------------------------------------------    Edie Randolph,     Please return to ER or call 911 if you develop any significant signs or symptoms.     I may not have addressed all of your medical illnesses or the abnormal blood work or imaging therefore please ask your PCP, iVda Red DO ,  to obtain [facility] record to follow up on all of the abnormal labs, imaging and findings that I have and have not addressed during your hospitalization.      Discharging you from the hospital does not mean that your medical care ends here and now.  You may still need additional work up, investigation, monitoring, and treatment to be handled from this point on by outside providers including your PCP, Vida Red DO , Specialists and

## 2018-07-16 NOTE — ED NOTES
Gave patient glucagon and after 30 min had him drink a glass of water. Patient immediately vomited up water after trying to swallow. [facility] notified.       Anu Stephens RN  07/16/18 [facility] Reji Richter RN  07/16/18 4517

## 2018-07-16 NOTE — OP NOTE
impacted piece of meat, no no strictures were seen. Recommendations: Patient be advised to chew the meat well and advance the diet.     Cristy Barron MD  [facility]

## 2018-07-16 NOTE — ANESTHESIA POSTPROCEDURE EVALUATION
Department of Anesthesiology  Postprocedure Note    Patient: Anna Lind  MRN: [id number]  YOB: 1932  Date of evaluation: 7/16/2018  Time:  5:16 PM     Procedure Summary     Date:  07/16/18 Room / Location:  [facility] OR    Anesthesia Start:  6086 Anesthesia Stop:      Procedure:  EGD, (ROOM 279) (N/A ) Diagnosis:  (INPATIENT)    Surgeon:  Ofelia Trivedi MD Responsible Provider:  Anne Rubio MD    Anesthesia Type:  MAC ASA Status:  4          Anesthesia Type: MAC    Jesus Phase I:      Jesus Phase II:      Last vitals: Reviewed and per EMR flowsheets.        Anesthesia Post Evaluation    Patient location during evaluation: PACU  Patient participation: complete - patient participated  Level of consciousness: awake and alert  Airway patency: patent  Nausea & Vomiting: no nausea and no vomiting  Complications: no  Cardiovascular status: blood pressure returned to baseline and hemodynamically stable  Respiratory status: acceptable  Hydration status: euvolemic

## 2018-07-16 NOTE — H&P
8/15/17  Yes Perla Jonas MD   magnesium oxide (MAG-OX) 400 MG tablet Take 400 mg by mouth daily   Yes Historical Provider, MD   amiodarone (CORDARONE) 200 MG tablet Take 100 mg by mouth daily    Yes Historical Provider, MD   ALPRAZolam Clydie Ores) 0.25 MG tablet  3/9/15  Yes Historical Provider, MD   cyclobenzaprine (FLEXERIL) 10 MG tablet Take 1 tablet by mouth 3 times daily as needed for Muscle spasms 5/15/18   Louise Negrete PA-C   ipratropium (ATROVENT) 0.03 % nasal spray 2 sprays by Nasal route 2 times daily 1/23/18   Historical Provider, MD   tamsulosin (FLOMAX) 0.4 MG capsule Take 0.4 mg by mouth daily 10/18/17   Historical Provider, MD   spironolactone (ALDACTONE) 25 MG tablet Take 25 tablets by mouth daily 12/20/17   Historical Provider, MD   carvedilol (COREG) 12.5 MG tablet Take 12.5 tablets by mouth 5/22/15   Historical Provider, MD   loratadine (CLARITIN) 10 MG tablet Take 1 tablet by mouth daily 12/6/17   QAMAR Olivarez CNP   melatonin 3 MG TABS tablet Take 3 mg by mouth daily    Historical Provider, MD   diphenhydrAMINE (BENADRYL) 12.5 MG/5ML elixir Take by mouth 4 times daily as needed for Allergies    Historical Provider, MD   DIGOXIN PO Take 0.125 mcg by mouth every other day    Historical Provider, MD   carbidopa-levodopa (SINEMET)  MG per tablet  3/9/15   Historical Provider, MD   KLOR-CON M10 10 MEQ tablet  12/23/14   Historical Provider, MD   carvedilol (COREG) 25 MG tablet Take 12.5 mg by mouth daily  5/29/12   Historical Provider, MD   spironolactone (ALDACTONE) 25 MG tablet 12.5 mg daily  12/30/14   Historical Provider, MD   furosemide (LASIX) 40 MG tablet  12/9/14   Historical Provider, MD   lisinopril (ZESTRIL) 5 MG tablet Take 2.5 mg by mouth daily  5/29/12   Historical Provider, MD   trifluoperazine (STELAZINE) 2 MG tablet Take 2 mg by mouth. 5/29/12   Historical Provider, MD   gentamicin (GARAMYCIN) 0.3 % ophthalmic solution  1/22/15   Historical Provider, MD clonazePAM (KLONOPIN) 0.5 MG tablet Take 0.5 mg by mouth. 2/18/15   Historical Provider, MD   Risedronate Sodium (ATELVIA) 35 MG TBEC Take 35 mg by mouth. 5/29/12   Historical Provider, MD       No Known Allergies    Family History   Problem Relation Age of Onset    Heart Disease Mother     COPD Father     Cancer Brother         Unknown type       Social History     Social History    Marital status:      Spouse name: N/A    Number of children: N/A    Years of education: N/A     Occupational History    Not on file.      Social History Main Topics    Smoking status: Former Smoker     Packs/day: 2.00     Years: 22.00     Quit date: 1983    Smokeless tobacco: Never Used    Alcohol use No    Drug use: No    Sexual activity: Not on file     Other Topics Concern    Not on file     Social History Narrative    No narrative on file       Review of Systems:   CONSTITUTIONAL:  negative for  fevers, chills, sweats, fatigue, malaise, anorexia and weight loss  EYES:  negative for  contacts, glasses, double vision, blurred vision, dry eyes, blind spots, eye discharge, visual disturbance, irritation, redness, icterus and color blindness  HEENT:  negative for  hearing loss, tinnitus, ear drainage, earaches, nasal congestion, epistaxis, snoring, sore mouth, sore throat, hoarseness and voice change  RESPIRATORY:  negative for  dry cough, cough with sputum, dyspnea, wheezing, hemoptysis, chest pain, pleuritic pain and cyanosis  CARDIOVASCULAR:  negative for  chest pain, dyspnea, palpitations, orthopnea, PND, exertional chest pressure/discomfort, fatigue, early saiety, edema, syncope  GASTROINTESTINAL:  negative for nausea, vomiting, change in bowel habits, diarrhea, constipation, abdominal pain, pruritus, abdominal mass, abdominal distention, jaundice,Complaining of food stuck in his throat, reflux, regurgitation, odynophagia, hematemesis and hemtochezia  GENITOURINARY:  negative for frequency, dysuria, nocturia,

## 2018-07-16 NOTE — ED PROVIDER NOTES
[facility] ED  eMERGENCY dEPARTMENT eNCOUnter      Pt Name: Raquel Srinivasan  MRN: [id number]  [place] 8/3/1932  Date of evaluation: 7/15/2018  Provider: Andra Walker PA-C    CHIEF COMPLAINT       Chief Complaint   Patient presents with    Foreign Body     in throat         HISTORY OF PRESENT ILLNESS   (Location/Symptom, Timing/Onset, Context/Setting, Quality, Duration, Modifying Factors, Severity)  Note limiting factors. Raquel Srinivasan is a 80 y.o. male who presents to the emergency department      Mr. Miguel Bonilla, 79 yo male, PMH of CAD, HLD and Parkinson's disease, presents with sensation of a \"stucked sausage\" in his esophagus associated with choking sensation that started about 3 hrs ago. He mentioned that \"I have tried using water to push it down but the water keeps coming back up\". He denies any fever/chills, CP, chest tightness, palpitations, SOB/POND, abdominal pain, nausea/vomiting, dizziness/lightheadedness. No other complaints. Nursing Notes were reviewed. REVIEW OF SYSTEMS    (2-9 systems for level 4, 10 or more for level 5)     Review of Systems   Constitutional: Positive for activity change. Negative for chills, fatigue and fever. HENT: Positive for trouble swallowing. Negative for drooling. Respiratory: Positive for choking. Negative for cough, chest tightness, shortness of breath and wheezing. Cardiovascular: Negative for chest pain and palpitations. Gastrointestinal: Negative for abdominal pain, diarrhea, nausea and vomiting. Skin: Negative for color change and wound. Neurological: Negative for dizziness, syncope, light-headedness and headaches. Psychiatric/Behavioral: Negative for agitation and confusion. Except as noted above the remainder of the review of systems was reviewed and negative.        PAST MEDICAL HISTORY     Past Medical History:   Diagnosis Date    CAD (coronary artery disease)     Hyperlipidemia     Parkinson's disease ([facility] [address] SURGICAL HISTORY       Past Surgical History:   Procedure Laterality Date    AORTIC VALVE REPLACEMENT      [facility]ORT[facility]    [facility]    COLONOSCOPY  5/1/15    w/polypectomy     PACEMAKER INSERTION      TONSILLECTOMY      UPPER GASTROINTESTINAL ENDOSCOPY  3/2/15    w/bx,dilation     UPPER GASTROINTESTINAL ENDOSCOPY  3/27/15    w/dilation          CURRENT MEDICATIONS       Previous Medications    ACETAMINOPHEN (TYLENOL) 650 MG EXTENDED RELEASE TABLET    Take 1,300 mg by mouth 2 times daily    ALBUTEROL SULFATE HFA (VENTOLIN HFA) 108 (90 BASE) MCG/ACT INHALER    Inhale 2 puffs into the lungs every 6 hours as needed for Wheezing    ALPRAZOLAM (XANAX) 0.25 MG TABLET        AMIODARONE (CORDARONE) 200 MG TABLET    Take 100 mg by mouth daily     ASPIRIN (ECOTRIN LOW STRENGTH) 81 MG EC TABLET    Take 81 tablets by mouth daily    ASPIRIN 81 MG TABLET    Take 81 mg by mouth daily    CARBIDOPA-LEVODOPA (SINEMET)  MG PER TABLET        CARVEDILOL (COREG) 12.5 MG TABLET    Take 12.5 tablets by mouth    CARVEDILOL (COREG) 25 MG TABLET    Take 12.5 mg by mouth daily     CLONAZEPAM (KLONOPIN) 0.5 MG TABLET    Take 0.5 mg by mouth.     CYCLOBENZAPRINE (FLEXERIL) 10 MG TABLET    Take 1 tablet by mouth 3 times daily as needed for Muscle spasms    DIGOXIN PO    Take 0.125 mcg by mouth every other day    DIPHENHYDRAMINE (BENADRYL) 12.5 MG/5ML ELIXIR    Take by mouth 4 times daily as needed for Allergies    FUROSEMIDE (LASIX) 40 MG TABLET        GENTAMICIN (GARAMYCIN) 0.3 % OPHTHALMIC SOLUTION        IPRATROPIUM (ATROVENT) 0.03 % NASAL SPRAY    2 sprays by Nasal route 2 times daily    KLOR-CON M10 10 MEQ TABLET        LISINOPRIL (ZESTRIL) 5 MG TABLET    Take 2.5 mg by mouth daily     LORATADINE (CLARITIN) 10 MG TABLET    Take 1 tablet by mouth daily    MAGNESIUM OXIDE (MAG-OX) 400 MG TABLET    Take 400 mg by mouth daily    MELATONIN 3 MG TABS TABLET    Take 3 mg by mouth daily    RISEDRONATE SODIUM (ATELVIA) 35 MG TBEC    Take 35 mg by mouth. SIMVASTATIN (ZOCOR) 40 MG TABLET    Take 0.5 tablets by mouth nightly    SPIRONOLACTONE (ALDACTONE) 25 MG TABLET    12.5 mg daily     SPIRONOLACTONE (ALDACTONE) 25 MG TABLET    Take 25 tablets by mouth daily    TAMSULOSIN (FLOMAX) 0.4 MG CAPSULE    Take 0.4 mg by mouth daily    TRIFLUOPERAZINE (STELAZINE) 2 MG TABLET    Take 2 mg by mouth. ALLERGIES     Patient has no known allergies. FAMILY HISTORY       Family History   Problem Relation Age of Onset    Heart Disease Mother     COPD Father     Cancer Brother         Unknown type          SOCIAL HISTORY       Social History     Social History    Marital status:      Spouse name: N/A    Number of children: N/A    Years of education: N/A     Social History Main Topics    Smoking status: Former Smoker     Packs/day: 2.00     Years: 22.00     Quit date: 1983    Smokeless tobacco: Never Used    Alcohol use No    Drug use: No    Sexual activity: Not Asked     Other Topics Concern    None     Social History Narrative    None       SCREENINGS             PHYSICAL EXAM    (up to 7 for level 4, 8 or more for level 5)     ED Triage Vitals [07/16/18 0002]   BP Temp Temp Source Pulse Resp SpO2 Height Weight   (!) 142/100 98.7 °F (37.1 °C) Oral 85 16 96 % 6' 1\" (1.854 m) 230 lb (104.3 kg)       Physical Exam   Constitutional: He is oriented to person, place, and time. He appears well-developed and well-nourished. No distress. Cardiovascular: Normal rate, regular rhythm and normal heart sounds. No murmur heard. Pulmonary/Chest: Effort normal and breath sounds normal. No respiratory distress. He has no wheezes. He exhibits no tenderness. Abdominal: Soft. Bowel sounds are normal. He exhibits no distension and no mass. There is no tenderness. Neurological: He is alert and oriented to person, place, and time. Skin: Skin is warm and dry. He is not diaphoretic.

## 2018-07-16 NOTE — ANESTHESIA PRE PROCEDURE
(SUBLIMAZE) injection 50 mcg  50 mcg Intravenous Q10 Min PRN Ana Paula Harrington MD        HYDROmorphone (DILAUDID) injection 0.5 mg  0.5 mg Intravenous Q10 Min PRN Ana Paula Harrington MD        HYDROcodone-acetaminophen [facility]) 5-325 MG per tablet 1 tablet  1 tablet Oral PRN Ana Paula Harrington MD        Or    HYDROcodone-acetaminophen [facility]) 5-325 MG per tablet 2 tablet  2 tablet Oral PRN Ana Paula Harrington MD        diphenhydrAMINE (BENADRYL) injection 12.5 mg  12.5 mg Intravenous Once PRN Ana Paula Harrington MD        ondansetron [facility]) injection 4 mg  4 mg Intravenous Once PRN Ana Paula Harrington MD        metoclopramide [facility]) injection 10 mg  10 mg Intravenous Once PRN Ana Paula Harrington MD        meperidine (DEMEROL) injection 12.5 mg  12.5 mg Intravenous Q5 Min PRN Ana Paula Harrington MD           Allergies:  No Known Allergies    Problem List:    Patient Active Problem List   Diagnosis Code    Aortic valve defect I35.9    A-fib ([facility] [address] I48.91    Primary cardiomyopathy ([address] I42.8    Atherosclerosis of coronary artery I25.10    Acid reflux K21.9    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), wet ([address] H35.3290    Pure hypercholesterolemia E78.00    Essential (primary) hypertension I10    Age related cataract H25.9    Parkinson disease ([address] G20    Hemoptysis R04.2    Pneumonia J18.9    Thrombocytopenia ([address] D69.6    Pneumonia of right lower lobe due to infectious organism ([address] J18.1    COPD, 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

## 2018-07-27 NOTE — ED PROVIDER NOTES
Hyperlipidemia     Parkinson's disease ([facility] [address]          SURGICAL HISTORY       Past Surgical History:   Procedure Laterality Date    AORTIC VALVE REPLACEMENT      [facility]ORT[facility]EMENT  1999    at [facility]    COLONOSCOPY  5/1/15    w/polypectomy     PACEMAKER INSERTION      MT EGD TRANSORAL BIOPSY SINGLE/MULTIPLE N/A 7/16/2018    [facility], ([address]  3/2/15    w/bx,dilation     UPPER GASTROINTESTINAL ENDOSCOPY  3/27/15    w/dilation          CURRENT MEDICATIONS       Previous Medications    ACETAMINOPHEN (TYLENOL) 650 MG EXTENDED RELEASE TABLET    Take 1,300 mg by mouth 2 times daily    ALBUTEROL SULFATE HFA (VENTOLIN HFA) 108 (90 BASE) MCG/ACT INHALER    Inhale 2 puffs into the lungs every 6 hours as needed for Wheezing    ALPRAZOLAM (XANAX) 0.25 MG TABLET        AMIODARONE (CORDARONE) 200 MG TABLET    Take 100 mg by mouth daily     AMIODARONE (CORDARONE) 200 MG TABLET    Take 100 mg by mouth daily    ASPIRIN (ECOTRIN LOW STRENGTH) 81 MG EC TABLET    Take 81 tablets by mouth daily    CARVEDILOL (COREG) 12.5 MG TABLET    Take 3.125 mg by mouth 2 times daily     DIGOXIN PO    Take 0.125 mcg by mouth Twice a Week every other day    FLUOXETINE (PROZAC) 40 MG CAPSULE    Take 40 mg by mouth daily    FUROSEMIDE (LASIX) 40 MG TABLET    Take 40 mg by mouth daily    IPRATROPIUM (ATROVENT) 0.03 % NASAL SPRAY    2 sprays by Nasal route 2 times daily    LISINOPRIL (ZESTRIL) 5 MG TABLET    Take 2.5 mg by mouth daily     MAGNESIUM OXIDE (MAG-OX) 400 MG TABLET    Take 400 mg by mouth daily    SIMVASTATIN (ZOCOR) 40 MG TABLET    Take 0.5 tablets by mouth nightly    SPIRONOLACTONE (ALDACTONE) 25 MG TABLET    Take 12.5 mg by mouth daily    TRIFLUOPERAZINE (STELAZINE) 2 MG TABLET    Take 2 mg by mouth nightly       ALLERGIES     Patient has no known allergies.     FAMILY HISTORY       Family History AM      PATIENT REFERRED TO:  Gricel King DO  [address]  [phone]    Schedule an appointment as soon as possible for a visit         DISCHARGE MEDICATIONS:  New Prescriptions    No medications on file          (Please note that portions of this note were completed with a voice recognition program.  Efforts were made to edit the dictations but occasionally words are mis-transcribed.)    Eusebia Scott MD (electronically signed)  Attending Emergency Physician          Kem Bentley MD  07/27/18 8162

## 2018-10-22 PROBLEM — R29.90 STROKE-LIKE SYMPTOM: Status: ACTIVE | Noted: 2018-01-01

## 2018-10-22 NOTE — ED TRIAGE NOTES
Pt a/o x 3 skin pink w/d resp nonlabored. Lungs bibasilar crackles. Pt had purple blue bruise to lt lower rib cage secondary to falling and hitting side on bed. Pt states was making his bed and missed his bed and fell hitting his head on dresser at 1900 last night. Pt denies loc or being on blood thinners. Pt has v-shaped lac to rt top of head which is not bleeding.

## 2018-10-22 NOTE — ED NOTES
Pt had unwitnessed fall in room and when entering room pt was on his back awake alert c/o head pain. Pt denies neck pain and assisted back to bed by rn Hayde Dorantes , Dr. Virginia Bourgeois and Dr. Tatianna Collins. Pt advised he is no longer leaving er and he will be admitted. Tracey Schmidt RN  10/22/18 1822

## 2018-10-22 NOTE — ED NOTES
Bed: 06  Expected date: 10/22/18  Expected time:   Means of arrival:   Comments:  Report from [employer] . 80 male fell last night. Has approx small laceration . No bleeding at this time. hx. Of trombocytopenia,platelets 894. .takes asa. Concern for a bleed. No neuro deficits.      Pranav Padilla RN  10/22/18 6016

## 2018-10-22 NOTE — ED NOTES
Spoke with daughter Joceline Pruett on phone who states someone was coming to pick pt up but did not now who or when. Ellen Heredia RN  10/22/18 1112

## 2018-10-22 NOTE — ED NOTES
Spoke with sister Jose Sweeney on phone who will come and talk with pt to stay. She states she is POA of medical. [address]   [address]  Tu De La Cruz RN  10/22/18 6206

## 2018-10-22 NOTE — ED PROVIDER NOTES
Performed by ED Physician - none    LABS:  Labs Reviewed   COMPREHENSIVE METABOLIC PANEL - Abnormal; Notable for the following:        Result Value    Anion Gap 15 (*)     Glucose 115 (*)     Total Bilirubin 2.5 (*)     All other components within normal limits   CBC WITH AUTO DIFFERENTIAL - Abnormal; Notable for the following:     RBC 4.51 (*)     Hemoglobin 13.6 (*)     Hematocrit 41.2 (*)     Platelets 086 (*)     Neutrophils # 8.3 (*)     Lymphocytes # 0.7 (*)     Monocytes # 0.9 (*)     All other components within normal limits   TROPONIN - Abnormal; Notable for the following:     Troponin 0.012 (*)     All other components within normal limits    Narrative:     Ramón Fink tel. [phone],  TROP results called to and read back by [facility] Theater[facility]ie Doorman in E.R., 10/22/2018  15:02, by ALEJANDRO   CK - Abnormal; Notable for the following: Total  (*)     All other components within normal limits   PROTIME-INR - Abnormal; Notable for the following:     Protime 12.5 (*)     All other components within normal limits   CBC WITH AUTO DIFFERENTIAL - Abnormal; Notable for the following:     RBC 4.22 (*)     Hemoglobin 12.9 (*)     Hematocrit 38.9 (*)     Platelets 96 (*)     Neutrophils # 7.9 (*)     Lymphocytes # 0.4 (*)     All other components within normal limits   BASIC METABOLIC PANEL W/ REFLEX TO MG FOR LOW K - Abnormal; Notable for the following: Anion Gap 15 (*)     Glucose 124 (*)     BUN 30 (*)     All other components within normal limits   HEPATIC FUNCTION PANEL - Abnormal; Notable for the following:      Total Protein 6.2 (*)     Alb 3.6 (*)     Total Bilirubin 2.7 (*)     Bilirubin, Direct 0.4 (*)     Bilirubin, Indirect 2.3 (*)     All other components within normal limits   CULTURE BLOOD #1   CULTURE BLOOD #2   ETHANOL   APTT   CKMB & RELATIVE PERCENT    Narrative:     Rosalie ZHANG tel. [phone],  TROP results called to and read back by [employer] [facility] [employer] [employer] Doorman in E.R., 10/22/2018  15:02, by Elijah Hernandez

## 2018-10-22 NOTE — H&P
83   Temp 97.9 °F (36.6 °C) (Oral)   Resp 22   Ht 6' 1\" (1.854 m)   Wt 220 lb (99.8 kg)   SpO2 95%   BMI 29.03 kg/m²     General appearance:  No apparent distress, appears stated age and cooperative. HEENT:  Normal cephalic, atraumatic without obvious deformity. Pupils equal, round, and reactive to light. Extra ocular muscles intact. Conjunctivae/corneas clear. Neck: Supple, with full range of motion. No jugular venous distention. Trachea midline. Respiratory:  Normal respiratory effort. Clear to auscultation, bilaterally without Rales/Wheezes/Rhonchi. Cardiovascular:  Regular rate and rhythm with normal S1/S2 without murmurs, rubs or gallops. Abdomen: Soft, non-tender, non-distended with normal bowel sounds. Musculoskeletal:  No clubbing, cyanosis or edema bilaterally. Full range of motion without deformity. Skin: Skin color, texture, turgor normal.  No rashes or lesions. Neurologic:  Neurovascularly intact without any focal sensory/motor deficits.  Cranial nerves: II-XII intact, grossly non-focal.  Psychiatric:  Alert and oriented, thought content appropriate, normal insight  Capillary Refill: Brisk,< 3 seconds   Peripheral Pulses: +2 palpable, equal bilaterally       Labs:     Recent Labs      10/21/18   1610  10/22/18   1345   WBC  4.0*  10.0   HGB  13.6*  13.6*   HCT  40.6*  41.2*   PLT  110*  124*     Recent Labs      10/21/18   1610  10/22/18   1345   NA  141  141   K  4.1  4.4   CL  99  99   CO2  31*  27   BUN  19  23   CREATININE  1.02  1.10   CALCIUM  9.0  9.2     Recent Labs      10/21/18   1610  10/22/18   1345   AST  21  26   ALT  13  14   BILITOT  1.3*  2.5*   ALKPHOS  74  70     Recent Labs      10/22/18   1345   INR  1.2     Recent Labs      10/22/18   1345   CKTOTAL  238*   TROPONINI  0.012*       Urinalysis:      Lab Results   Component Value Date    NITRU Negative 10/25/2015    WBCUA 20-50 10/25/2015    BACTERIA Moderate 10/25/2015    RBCUA 0-2 10/25/2015    BLOODU Negative 10/25/2015    SPECGRAV 1.018 10/25/2015    GLUCOSEU Negative 10/25/2015       Radiology:     CXR: I have reviewed the CXR with the following interpretation: pending  EKG:  I have reviewed the EKG with the following interpretation: pending    XR RIBS LEFT INCLUDE CHEST (MIN 3 VIEWS)   Final Result   MULTIPLE LEFT RIB FRACTURES DESCRIBED AND FELT TO BE ACUTE. NO UNDERLYING PNEUMOTHORAX OR SIGNIFICANT CONTUSION OR HEMOPNEUMOTHORAX. CARDIOMEGALY AND VASCULAR CONGESTION. CT Head WO Contrast   Final Result   Impression:      Interval development of petechial hemorrhage, within area of acute/subacute left cerebellar infarct. 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 Mild cerebral atrophy. All CT scans at this facility use dose modulation, iterative reconstruction, and/or weight based dosing when appropriate to reduce radiation dose to as low as reasonably achievable. XR CHEST PORTABLE   Final Result   STABLE CARDIOMEGALY. BIBASILAR SUBSEGMENTAL ATELECTASIS/PNEUMONIA. SMALL EFFUSIONS. CT Head WO Contrast   Final Result   FINAL IMPRESSION:      MODERATE-SIZED RECENT-APPEARING LEFT CEREBELLAR HEMISPHERE ISCHEMIC INFARCT. NO INTRACRANIAL HEMORRHAGE OR EVIDENCE OF SIGNIFICANT CERVICAL SPINE INJURY. MILD AGE-RELATED ATROPHIC CHANGES AND SMALL OLD LEFT FRONTAL LOBE AND RIGHT CEREBELLAR HEMISPHERE INFARCTS. MILD CERVICAL SPONDYLOSIS. CT CERVICAL SPINE WO CONTRAST   Final Result   FINAL IMPRESSION:      MODERATE-SIZED RECENT-APPEARING LEFT CEREBELLAR HEMISPHERE ISCHEMIC INFARCT. NO INTRACRANIAL HEMORRHAGE OR EVIDENCE OF SIGNIFICANT CERVICAL SPINE INJURY. MILD AGE-RELATED ATROPHIC CHANGES AND SMALL OLD LEFT FRONTAL LOBE AND RIGHT CEREBELLAR HEMISPHERE INFARCTS. MILD CERVICAL SPONDYLOSIS.                       ASSESSMENT:    Active Hospital Problems    Diagnosis Date Noted    Stroke-like symptom [R29.90] 10/22/2018 PLAN:        DVT Prophylaxis: lovenox   Diet:    Code Status: Prior    PT/OT Eval Status: eval and tx    1. Acute CVA-will admit and consult Dr. Ross Lancaster. Will obtain a 2D Echo carotid us and mri/mra of the brain  2. Possible pneumonia-will admit and medicate with iv ceftriaxone and azithromycin     THEODORE Olsen    Thank you Rachael Rodriguez DO for the opportunity to be involved in this patient's care. If you have any questions or concerns please feel free to contact me.

## 2018-10-23 NOTE — PROGRESS NOTES
Facility/Department: 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    ONSET DATE: 10/22/2018    Date of Eval: 10/23/2018  Evaluating Therapist: Martin Vega     A Modified Barium Swallow is recommended. Recommended Diet and Intervention  Diet Solids Recommendation: NPO  Liquid Consistency Recommendation: NPO  Recommended Form of Meds: Via alternative means of nutrition  Therapeutic Interventions: Patient/Family education, Thermal gustatory stimulation, Therapeutic PO trials with SLP, Oral motor exercises, Pharyngeal exercises, Laryngeal exercises       Reason for Referral  Amy Sanders was referred for a bedside swallow evaluation to assess the efficiency of his swallow function, identify signs and symptoms of aspiration and make recommendations regarding safe dietary consistencies, effective compensatory strategies, and safe eating environment. General  Chart Reviewed: Yes  Behavior/Cognition: Alert; Cooperative;Pleasant mood  Respiratory Status: O2 via nasual cannula  Breath Sounds: Clear  O2 Device: Nasal cannula  Communication Observation: Functional  Follows Directions: Simple  Dentition: Some missing teeth  Patient Positioning: Upright in bed  Baseline Vocal Quality: Hoarse  Volitional Cough: Congested  Volitional Swallow: Delayed  Prior Dysphagia History: Pt had his esophogus dilated in the BSE  Patient Education Response: Needs reinforcement    G-Code:  SLP G-Codes  Functional Limitations: Swallowing  Swallow Current Status (): 100 percent impaired, limited or restricted  Swallow Goal Status ():  At least 60 percent but less than 80 percent impaired, limited or restricted    Pain:  Pain Assessment  Patient Currently in Pain: Denies  Pain Assessment: 0-10  Pain Level: 0 (Pt has no complaints of pain at this time)  Pain Type: Acute pain  Pain Location: Rib cage  Pain Orientation: Left, Lower  Patient's Stated Pain Goal: No pain       Therapy Time  SLP Individual Minutes  Time In: 1026  Time Out: [address]  [facility] Date: 10/23/2018, Time: 10:54 AM

## 2018-10-23 NOTE — PROGRESS NOTES
Nutrition Assessment    Type and Reason for Visit: Initial, Positive Nutrition Screen (chewing/swallowing)    Nutrition Recommendations: Continue NPO (follow up for results after swallow eval)    Malnutrition Assessment:  · Malnutrition Status: No malnutrition  · Context: Acute illness or injury  · Findings of the 6 clinical characteristics of malnutrition (Minimum of 2 out of 6 clinical characteristics is required to make the diagnosis of moderate or severe Protein Calorie Malnutrition based on AND/ASPEN Guidelines):  1. Energy Intake-Greater than 75%, greater than or equal to 5 days    2. Weight Loss-No significant weight loss,    3. Fat Loss-No significant subcutaneous fat loss,    4. Muscle Loss-No significant muscle mass loss,    5. Fluid Accumulation-Mild fluid accumulation, Extremities  6.  Strength-Normal (hand shake firm)    Nutrition Diagnosis:   · Problem: Inadequate oral intake  · Etiology: related to Difficulty swallowing     Signs and symptoms:  as evidenced by NPO status due to medical condition    Nutrition Assessment:  · Subjective Assessment: Pt admitted from home s/p fall, dx,  CVA, + head laceration and rib fx's. Per rounds \"Pt with nausea/vomiting vs cough with aspiration during breakfast' despite passing  nursing dysphagia screen.  Now has order for bedside swallow eval. Pt denies any change in appetite,intake or weigh PTA  · Nutrition-Focused Physical Findings: 2+ edema BLE, periheral IVF = .9 NS @ 75ml/hr, last BM PTA, noted arm tremors  · Wound Type:  (laceration scalp from fall)  · Current Nutrition Therapies:  · Oral Diet Orders: NPO (10/22)   · Anthropometric Measures:  · Ht: 6' 1\" (185.4 cm)   · Current Body Wt: 228 lb (103.4 kg) (10/23)  · Admission Body Wt: 220 lb (99.8 kg) (stated)  · Usual Body Wt: 220 lb (99.8 kg) (2017; (4/18) 233#, 225#(7/18))  · % Weight Change: 0,  0  · Ideal Body Wt: 184 lb (83.5 kg), % Ideal Body > 100%  · Adjusted Body Wt:  , body weight adjusted for

## 2018-10-23 NOTE — PROCEDURES
folds, and no effort is made to eject. Compensatory Strategies:  Effective compensatory strategies:     [x] double swallow        [] multiple swallow     [] alternating solids/liquids     [] chin tuck     [] cued throat clear     [] cued redirective cough    Pharyngeal Stage Impression:  Mild pharyngeal dysphagia characterized by a mildly delayed initiation of the pharyngeal swallow with the swallow reflex triggered at the level of the valleculae per regular solids and thin liquids. Reduced tongue base retraction resulted in min residuals in the valleculae per thin liquid that cleared with a cued double swallow. No aspiration or laryngeal penetration observed. STRUCTURAL/FUNCTIONAL ANOMALIES    [x]No structural/functional anomalies were noted    []Inadequate velopharyngeal closure resulting in nasopharyngeal reflux. [] There was presence of Zenkers Diverticulum per Radiologist     []Comments:     CERVICAL ESOPHAGEAL STAGE : []Adequate []Inadequate  []Not Assessed     []Cervical osteophytes present per Radiologist     []Structural/mechanical abnormality in cervical esophagus per Radiologist    [x] Redirection of bolus from the esophagus toward pharynx     Long Term Goals:  [x] Will tolerate least restrictive diet safely      []Goals to be determined after MBS      [] No Treatment indicated at this time      Short Term Goals:   Pt to be seen  1-2x/week    Oral Phase:  [x]  Pt will complete lingual exercises that promote anterior to posterior propulsion of bolus and improve tongue base retraction with 80% accuracy in order to strengthen the muscles of the swallow to decrease risk of aspiration and to increase ability to safely handle the least restrictive diet level. Pharyngeal Phase:   [x]  Pt will complete pharyngeal strengthening exercises (such as the Con[place], Effortful swallow, etc) with 80% acc in order to strengthen and establish and more effective swallow.      Trials and Continued assessment:  [x]  Pt will demonstrate double swallow for safe and efficient swallow of all consistencies in all given opportunities. [x] Pt will tolerate the recommended diet level with no s/s of aspiration. Plan of Care:    [x] Oral/motor exercises  [] Adduction/voice/pitch exercises   [x] Dysphagia exercies   [] Mastication exercises   [] Therapeutic meal monitoring [] Repeat MBS Recommended in _ days    [x]  Prognosis for improvements is: Good,  motivation    Pain:none, N/A   []Nursing notified    Clydell Dolores:     Swallow Current  CI   Swallow Goal  CH     Severity Levels  CH - 0% Impaired or limited  CI - At least 1%, but less than 20%  CJ - At least 20%, but less than 40%  CK - At least 40%, but less than 60%  CL - At least 60%, but less than 80%  CM - At least 80%, but less than 100%  CN - 100% impaired or limited    Radiologist:  Available on Consult as needed, Radiology Tech present    Treatment goals were discussed with the: [x] patient  [] family. The [x]  patient []  family understand the diagnosis, prognosis and plan of care. []  Reinforcement is needed    Thank you for your referral.  Please direct any questions or concerns to Speech Pathology. Images are available through [facility]/PACS. Please see radiologist report for additional details.     Therapist:  Electronically signed by JESUS Miranda on 10/23/2018 at 3:05 PM

## 2018-10-23 NOTE — CARE COORDINATION
Pt is not in his room, he is down for MBS AND CT scan. LSW spoke to Pts sister/POA she stated she would like [employer]. LSW did mention [facility], sister stated she would talk to Pt and call LSW when she hears from Pt. .Info was faxed to [place] so they could look over the paper work. .Electronically signed by SHANEKA Brown on 10/23/2018 at 2:00 PM

## 2018-10-24 NOTE — PROGRESS NOTES
Cardiac Exam:  normal         Xr Ribs Left Include Chest (min 3 Views)    Result Date: 10/22/2018  EXAMINATION: Left RIBS and AP chest. 6 films CLINICAL HISTORY: Trauma. Pain FINDINGS: AP and oblique views of the ribs on the left side and a frontal view of the chest were obtained. BB marker overlies the lower lateral left hemithorax at the site of an acute slightly angled left 10th rib fracture, apex laterally. In addition there are displaced fractures through the posterior aspect of the left seventh, eighth, and ninth ribs. No underlying pneumothorax. No significant parenchymal contusion or pleural effusion appreciated. Cardiomegaly with pacemaker wires in place. Valvular prosthesis overlies the enlarged heart. Mild central vascular congestion bilaterally noted. MULTIPLE LEFT RIB FRACTURES DESCRIBED AND FELT TO BE ACUTE. NO UNDERLYING PNEUMOTHORAX OR SIGNIFICANT CONTUSION OR HEMOPNEUMOTHORAX. CARDIOMEGALY AND VASCULAR CONGESTION. Ct Head Wo Contrast    Result Date: 10/23/2018  CT HEAD WO CONTRAST CLINICAL HISTORY: Cerebellar infarct, follow-up COMPARISON: October 22, 2018 TECHNIQUE: Multiple unenhanced serial axial images of the brain from the vertex of the skull to the base of the skull were performed. FINDINGS: The ventricles are dilated. This is compensatory to the surrounding moderate generalized parenchymal volume loss. No mass. No midline shift. The cisterns are patent. Again note is made of mass effect on the left side of the ambient cistern. Unchanged There are white matter and periventricular changes most likely consistent with chronic small vessel disease. Old left frontal area of infarct encephalomalacia. There is diffuse decreased attenuation with increasing areas of attenuation seen in the left inferior cerebellar cortex consistent with increasing hemorrhagic conversion as compared to the prior study. This is best appreciated on the sagittal reconstructions. No acute  extra-axial findings. The visualized osseous structures are unremarkable. The visualized portion of the paranasal sinuses, and mastoids are unremarkable. Surgical staples overlying the right occipital region. 1. INTERVAL EVOLUTION, PROGRESSION OF A LEFT INFERIOR CEREBELLAR HEMISPHERE INFARCT WITH BOTH VASOGENIC EDEMA AND INCREASING HEMORRHAGIC CONVERSION. All CT scans at this facility use dose modulation, iterative reconstruction, and/or weight based dosing when appropriate to reduce radiation dose to as low as reasonably achievable. Ct Head Wo Contrast    Result Date: 10/22/2018  CT Brain Contrast medium:  Not utilized. History:  Confusion. Daniela Anthony in ER after being discharged. Comparison:  CT brain obtained earlier same day. Findings: Extra-axial spaces:  Normal. Intracranial hemorrhage:  None. Ventricular system: Ventricles mildly enlarged. Sulci mildly prominent. Basal Cisterns:  Normal. Cerebral Parenchyma: Geographic area of decreased attenuation exerting no mass effect, left frontal lobe, and right cerebellum. Midline Shift:  None. Cerebellum: Interval development of punctate areas of increased attenuation within ill-defined area of low attenuation left cerebellum (series 602, image 27). Paranasal sinuses and mastoid air cells:  Normal. Visualized Orbits:  Normal.     Impression: Interval development of petechial hemorrhage, within area of acute/subacute left cerebellar infarct. 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 Mild cerebral atrophy. All CT scans at this facility use dose modulation, iterative reconstruction, and/or weight based dosing when appropriate to reduce radiation dose to as low as reasonably achievable. Ct Head Wo Contrast    Result Date: 10/22/2018  CT HEAD WO CONTRAST, CT CERVICAL SPINE WO CONTRAST: 10/22/2018 CLINICAL HISTORY:  fall/ scalp laceration . COMPARISON: Head CT 8/11/2017 and neck CT 1/21/2017.  TECHNIQUE: ROUTINE All CT scans at this facility use dose modulation, iterative

## 2018-10-24 NOTE — PLAN OF CARE
Problem: Safety  Goal: No falls during hospitalization  Patient will not fall during hospitalization.      Outcome: Ongoing      Problem: Skin Integrity - Risk of, Impaired  Goal: No skin breakdown during hospitalization  Outcome: Ongoing      Problem: NEUROLOGICAL DEFICIT  Goal: Absence of continued neurological deterioration signs and symptoms  Absence of continued neurological deterioration signs and symptoms     Outcome: Ongoing      Problem: Pain:  Goal: Pain level will decrease  Pain level will decrease   Outcome: Ongoing    Goal: Control of acute pain  Control of acute pain   Outcome: Ongoing    Goal: Control of chronic pain  Control of chronic pain   Outcome: Ongoing

## 2018-10-24 NOTE — PLAN OF CARE
Problem: NEUROLOGICAL DEFICIT  Goal: Absence of continued neurological deterioration signs and symptoms  Absence of continued neurological deterioration signs and symptoms     Outcome: Ongoing

## 2018-10-24 NOTE — PROGRESS NOTES
[facility] EMERGENCY [facility] AT [place] Respiratory Therapy Evaluation   Current Order:  Meriam Black and albuterol HFA Q6 prn     Home Regimen: MDI Q6 prn     Ordering Physician: Armando  Re-evaluation Date:  10/26     Diagnosis: CVA    Patient Status: Stable / Unstable + Physician notified    The following MDI Criteria must be met in order to convert aerosol to MDI with spacer.  If unable to meet, MDI will be converted to aerosol:  []  Patient able to demonstrate the ability to use MDI effectively  []  Patient alert and cooperative  []  Patient able to take deep breath with 5-10 second hold  []  Medication(s) available in this delivery method   []  Peak flow greater than or equal to 200 ml/min            Current Order Substituted To  (same drug, same frequency)   Aerosol to MDI [] Albuterol Sulfate 0.083% unit dose by aerosol Albuterol Sulfate MDI 2 puffs by inhalation with spacer    [] Levalbuterol 1.25 mg unit dose by aerosol Levalbuterol MDI 2 puffs by inhalation with spacer    [] Levalbuterol 0.63 mg unit dose by aerosol Levalbuterol MDI 2 puffs by inhalation with spacer    [] Ipratropium Bromide 0.02% unit dose by aerosol Ipratropium Bromide MDI 2 puffs by inhalation with spacer    [] Duoneb (Ipratropium + Albuterol) unit dose by aerosol Ipratropium MDI + Albuterol MDI 2 puffs by inhalation w/spacer   MDI to Aerosol [] Albuterol Sulfate MDI Albuterol Sulfate 0.083% unit dose by aerosol    [] Levalbuterol MDI 2 puffs by inhalation Levalbuterol 1.25 mg unit dose by aerosol    [] Ipratropium Bromide MDI by inhalation Ipratropium Bromide 0.02% unit dose by aerosol    [] Combivent (Ipratropium + Albuterol) MDI by inhalation Duoneb (Ipratropium + Albuterol) unit dose by aerosol   Treatment Assessment [Frequency/Schedule]:  Change frequency to: duoneb q4 prn __________________________________________________per Protocol, P&T, [facility]      Points 0 1 2 3 4   Pulmonary Status  Non-Smoker  []   Smoking history   < 20 pack years  []   Smoking

## 2018-10-24 NOTE — PROGRESS NOTES
distended. Prostate is mildly enlarged and demonstrates a few internal calcifications. Small left inguinal hernia. Abdominal aorta is nonaneurysmal  and demonstrates mild atherosclerotic calcification . Celiac axis, superior mesenteric artery, and inferior mesenteric artery are patent. No retroperitoneal or abdominal/pelvic lymphadenopathy. No small bowel obstruction. A few colonic diverticuli are identified. No overt colonic mass or pericolonic inflammation. Appendix is within normal limits. No free fluid or free air. Degenerative changes are present of the spine and both hips. No acute intra-abdominal process. Small bilateral pleural effusions and scattered groundglass opacities in the lung bases other relating to infiltrate or atelectasis. Colonic diverticulosis without diverticulitis , cholelithiasis, an additional chronic findings as above. . All CT scans at this facility use dose modulation, iterative reconstruction, and/or weight based dosing when appropriate to reduce radiation dose to as low as reasonably achievable. Xr Chest Portable    Result Date: 10/22/2018  EXAMINATION: XR CHEST PORTABLE CLINICAL HISTORY: FALL. HEAD INJURY. COMPARISONS: APRIL 19, 2018, AUGUST 15, 2017 FINDINGS: Median sternotomy. Pacemaker generator and wires unchanged. Cardiopericardial silhouette enlarged. Ill-defined areas of increased opacity at lung bases. Blunting costophrenic angles. STABLE CARDIOMEGALY. BIBASILAR SUBSEGMENTAL ATELECTASIS/PNEUMONIA. SMALL EFFUSIONS. Fl Modified Barium Swallow W Video    Result Date: 10/24/2018  EXAMINATION: MODIFIED BARIUM SWALLOW: CLINICAL DATA: DYSPHAGIA. COMPARISON: November 14, 2014. TECHNIQUE: A total of 7 dynamic image series over the course of 2.2 minutes were obtained. In cooperation with Speech Pathology, a modified barium swallowing using various consistencies of both solids and liquids with dynamic imaging was performed.  FINDINGS: Patient's oral stage was characterized by

## 2018-10-24 NOTE — ANESTHESIA PRE PROCEDURE
Department of Anesthesiology  Preprocedure Note       Name:  Yolanda Alvares   Age:  80 y.o.  :  8/3/1932                                          MRN:  [id number]         Date:  10/24/2018      Surgeon: Dave Amrbosio):  Alberto Rogers MD    Procedure: EGD ROOM 266 (N/A )    Medications prior to admission:   Prior to Admission medications    Medication Sig Start Date End Date Taking? Authorizing Provider   carbidopa-levodopa (SINEMET)  MG per tablet Take 1 tablet by mouth 2 times daily   Yes Historical Provider, MD   melatonin 3 MG TABS tablet Take 3 mg by mouth nightly   Yes Historical Provider, MD   diphenhydrAMINE (BENADRYL) 25 MG tablet Take 25 mg by mouth every evening   Yes Historical Provider, MD   famotidine (PEPCID) 20 MG tablet Take 1 tablet by mouth 2 times daily 10/21/18   Kenneth Scott PA-C   LORazepam (ATIVAN) 0.5 MG tablet Take 1 tablet by mouth every 6 hours as needed for Anxiety for up to 30 days. . 10/21/18 11/20/18  Kenneth Scott PA-C   FLUoxetine (PROZAC) 40 MG capsule Take 20 mg by mouth daily  18   Historical Provider, MD   ipratropium (ATROVENT) 0.03 % nasal spray 2 sprays by Nasal route 2 times daily 18   Historical Provider, MD   amiodarone (CORDARONE) 200 MG tablet Take 100 mg by mouth daily 18   Historical Provider, MD   furosemide (LASIX) 40 MG tablet Take 40 mg by mouth daily    Historical Provider, MD   spironolactone (ALDACTONE) 25 MG tablet Take 12.5 mg by mouth daily    Historical Provider, MD   trifluoperazine (STELAZINE) 2 MG tablet Take 2 mg by mouth nightly    Historical Provider, MD   carvedilol (COREG) 12.5 MG tablet Take 3.125 mg by mouth 2 times daily  5/22/15   Historical Provider, MD   aspirin (ECOTRIN LOW STRENGTH) 81 MG EC tablet Take 81 tablets by mouth daily 08   Historical Provider, MD   albuterol sulfate HFA (VENTOLIN HFA) 108 (90 Base) MCG/ACT inhaler Inhale 2 puffs into the lungs every 6 hours as needed for Wheezing 18   [id number], 10/24/2018    AST 27 10/24/2018    ALT <5 10/24/2018       POC Tests: No results for input(s): POCGLU, POCNA, POCK, POCCL, POCBUN, POCHEMO, POCHCT in the last 72 hours. Coags:   Lab Results   Component Value Date    PROTIME 12.5 10/22/2018    INR 1.2 10/22/2018    APTT 26.4 10/22/2018       HCG (If Applicable): No results found for: PREGTESTUR, PREGSERUM, HCG, HCGQUANT     ABGs: No results found for: PHART, PO2ART, DSB8CTW, JXL6FEG, BEART, T2KLKYHC     Type & Screen (If Applicable):  No results found for: LABABO, [address]    Anesthesia Evaluation  Patient summary reviewed and Nursing notes reviewed no history of anesthetic complications:   Airway: Mallampati: II  TM distance: >3 FB   Neck ROM: full  Mouth opening: > = 3 FB Dental:      Comment: Multiple missing teeth. All remaining teeth rotted and/or chipped. Pulmonary:   (+) pneumonia:  COPD:  shortness of breath:                            ROS comment: Reformed smoker     Cardiovascular:    (+) hypertension:, valvular problems/murmurs:, CAD:, dysrhythmias:,                ROS comment: S/P AVR     Neuro/Psych:   (+) neuromuscular disease: Parkinson's disease, psychiatric history:            GI/Hepatic/Renal:   (+) GERD:,           Endo/Other:    (+) blood dyscrasia: anemia and thrombocytopenia:., .                 Abdominal:           Vascular:                                      Anesthesia Plan      MAC     ASA 3       Induction: intravenous. Anesthetic plan and risks discussed with patient. Plan discussed with CRNA.     Attending anesthesiologist reviewed and agrees with Jennie Anderson MD   10/24/2018

## 2018-10-24 NOTE — PROGRESS NOTES
[facility] AT [place] Respiratory Therapy Evaluation   Current Order:  PRN duoneb     Home Regimen: PRN albuterol     Ordering Physician: Ev Stanton  Re-evaluation Date: 10/27     Diagnosis:copd     Patient Status: Stable / Unstable + Physician notified    The following MDI Criteria must be met in order to convert aerosol to MDI with spacer. If unable to meet, MDI will be converted to aerosol:  []  Patient able to demonstrate the ability to use MDI effectively  []  Patient alert and cooperative  []  Patient able to take deep breath with 5-10 second hold  []  Medication(s) available in this delivery method   []  Peak flow greater than or equal to 200 ml/min            Current Order Substituted To  (same drug, same frequency)   Aerosol to MDI [] Albuterol Sulfate 0.083% unit dose by aerosol Albuterol Sulfate MDI 2 puffs by inhalation with spacer    [] Levalbuterol 1.25 mg unit dose by aerosol Levalbuterol MDI 2 puffs by inhalation with spacer    [] Levalbuterol 0.63 mg unit dose by aerosol Levalbuterol MDI 2 puffs by inhalation with spacer    [] Ipratropium Bromide 0.02% unit dose by aerosol Ipratropium Bromide MDI 2 puffs by inhalation with spacer    [] Duoneb (Ipratropium + Albuterol) unit dose by aerosol Ipratropium MDI + Albuterol MDI 2 puffs by inhalation w/spacer   MDI to Aerosol [] Albuterol Sulfate MDI Albuterol Sulfate 0.083% unit dose by aerosol    [] Levalbuterol MDI 2 puffs by inhalation Levalbuterol 1.25 mg unit dose by aerosol    [] Ipratropium Bromide MDI by inhalation Ipratropium Bromide 0.02% unit dose by aerosol    [] Combivent (Ipratropium + Albuterol) MDI by inhalation Duoneb (Ipratropium + Albuterol) unit dose by aerosol   Treatment Assessment [Frequency/Schedule]:  Change frequency to: ______QID duoneb_________________________________________per Protocol, P&T, [facility]      Points 0 1 2 3 4   Pulmonary Status  Non-Smoker  []   Smoking history   < 20 pack years  []   Smoking history  ?  20 pack years  []

## 2018-10-24 NOTE — PROGRESS NOTES
neurology will see soon  - prn hydralazine for BP >233 systolic   - Pt/OT  - resume statin  - SLP   - tele      # Chronic systolic heart failure   - dc IVF   - start his home lasix    # dysphagia  - MBS done. On regular diet with thin liquids. EGD done for concerns for esophageal dysphagia which only showed gastritis. # PNA ruled out- abx dc'ed     # Multiple left rib fractures, present on admission  - percocet prn     # Cerebral edema de to acute CVA and petechial hemorrhage   \- as above     # afib  - rate control with BB and amio as ordered. No AC, will have cardiology follow post dc.      # h/o aortic valve replacement- bioprosthetic valve     # COPD   - stable, resume prn nebs    DVT/PPx- scd's       DISCHARGE PLANNING  Pending neuro, PT/OT       Electronically signed by Em Maldonado DO on 10/24/2018 at 1:49 PM

## 2018-10-24 NOTE — PROGRESS NOTES
Physical Therapy Med Surg Initial Assessment  Facility/Department: [facility]  Room: [place] [id number]       NAME: Lisseth Robles  : 8/3/1932 (80 y.o.)  MRN: [id number]  CODE STATUS: Full Code    Date of Service: 10/24/2018    Patient Diagnosis(es): Stroke-like symptom [R29.90]   Chief Complaint   Patient presents with    Laceration     rt top of head secondary to fall     Patient Active Problem List    Diagnosis Date Noted    Stroke-like symptom 10/22/2018    Dysphagia 2018    Food impaction of esophagus 2018    SOB (shortness of breath)     COPD, mild ([address] 2017    Pneumonia of right lower lobe due to infectious organism ([address]     Thrombocytopenia ([address] 2017    Pneumonia 08/10/2017    Hemoptysis 2016    Aortic valve defect 2016    Primary cardiomyopathy ([address] 2016    Pure hypercholesterolemia 2016    Essential (primary) hypertension 2016    Parkinson disease ([address] 2016    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), wet ([address] 2015    Age related cataract 2012    Acid reflux 2006    A-fib ([address] 1999    Atherosclerosis of coronary artery 1999        Past Medical History:   Diagnosis Date    CAD (coronary artery disease)     Depression     Hyperlipidemia     Parkinson's disease ([address]      Past Surgical History:   Procedure Laterality Date    AORTIC VALVE REPLACEMENT      [facility]AC[facility]      [facility]    COLONOSCOPY  5/1/15    w/polypectomy     PACEMAKER INSERTION      CA EGD TRANSORAL BIOPSY SINGLE/MULTIPLE N/A 2018    EGD, (ROOM 279) performed by Lenora Calzada MD at [facility]  3/2/15    w/bx,dilation     UPPER GASTROINTESTINAL ENDOSCOPY  3/27/15    w/dilation        Chart Reviewed: Yes  Patient assessed for rehabilitation services?: Yes  Family / Caregiver Present: No  General ASSESSMENT:   Body structures, Functions, Activity limitations: Decreased functional mobility ; Decreased strength;Decreased endurance;Decreased coordination;Decreased safe awareness;Decreased balance;Decreased fine motor control  Decision Making: Medium Complexity    Prognosis: Good  Patient Education: PT POC, safety in room and use of call light, fww use    DISCHARGE RECOMMENDATIONS:  Discharge Recommendations: Continue to assess pending progress, Patient would benefit from continued therapy after discharge    Assessment: Pt demonstrates the above deficits and decline in functional mobility status placing them at increased risk for falls. Pt would benefit from physical therapy to address above deficits and allow for safe return home at highest level of function, decrease risk for falls, and improve QOL. REQUIRES PT FOLLOW UP: Yes      PLAN OF CARE:  Plan  Times per week: 3-6x/week  Current Treatment Recommendations: Strengthening, Functional Mobility Training, Neuromuscular Re-education, Home Exercise Program, Modalities, Patient/Caregiver Education & Training, Transfer Training, Gait Training, Safety Education & Training, Positioning, Balance Training, Endurance Training, Stair training, Pain Management  Safety Devices  Type of devices: Bed alarm in place, Call light within reach    G-Code:  PT G-Codes  Functional Assessment Tool Used: clinical judgement  Functional Limitation: Mobility: Walking and moving around  Mobility: Walking and Moving Around Current Status ():  At least 20 percent but less than 40 percent impaired, limited or restricted  Mobility: Walking and Moving Around Goal Status (): 0 percent impaired, limited or restricted    Goals:  Patient goals : Patient willing to participate in PT POC  Short term goals  Short term goal 1: Patient will be SBA HEP  Short term goal 2: Patient will complete bed mobility SBA  Short term goal 3: Patient will sit<>stand SBA  Long term goals  Long term

## 2018-10-25 NOTE — CODE DOCUMENTATION
Ng tube placed by RN checked placement. Placement confirmed. Attached to low intermittent suction. 65 at right nares.

## 2018-10-25 NOTE — PROGRESS NOTES
Physician Progress Note    10/25/2018   10:30 AM    Name:  Karen Orlando  MRN:    [id number]      Day: 3     Admit Date: 10/22/2018 10:40 AM  PCP: Isabel Sánchez DO    Code Status:  Full Code    Subjective:     Rapid response called for hypoxia and low BP. He is AOx3. Started on BIPAP and sent to ICU.      Physical Examination:      Vitals:  BP (!) 91/56   Pulse 75   Temp 98.2 °F (36.8 °C) (Oral)   Resp 16   Ht 6' 1\" (1.854 m)   Wt 228 lb (103.4 kg) Comment: bedscale  SpO2 (!) 83%   BMI 30.08 kg/m²   Temp (24hrs), Av.4 °F (36.9 °C), Min:98.2 °F (36.8 °C), Max:98.6 °F (37 °C)      General appearance: alert, cooperative and no distress  Mental Status: oriented to person, place and time and normal affect  Lungs: crackles bilaterally, normal effort  Heart: regular rate and rhythm, no murmur  Abdomen: soft, nontender, nondistended, bowel sounds present, no masses  Extremities: 1+ pitting edema, redness, tenderness in the calves  Skin: no gross lesions, rashes    Data:     Labs:  Recent Labs      10/24/18   0453  10/25/18   0505  10/25/18   0931   WBC  8.2  8.4   --    HGB  11.6*  11.7*  12.3*   PLT  83*  82*   --      Recent Labs      10/24/18   0453  10/25/18   0505  10/25/18   0931   NA  143  142   --    K  4.3  4.2   --    CL  105  103   --    CO2  27  29   --    BUN  37*  41*   --    CREATININE  0.73  0.87  1.0   GLUCOSE  113*  131*   --      Recent Labs      10/24/18   0453  10/25/18   0505   AST  27  25   ALT  <5  <5   BILITOT  2.4*  2.7*   ALKPHOS  47  47       Current Facility-Administered Medications   Medication Dose Route Frequency Provider Last Rate Last Dose    sodium chloride 0.9 % infusion             vancomycin (VANCOCIN) 1,250 mg in dextrose 5 % 250 mL IVPB  1,250 mg Intravenous Q12H Yazid R Armando, DO        piperacillin-tazobactam (ZOSYN) 3.375 g in dextrose 5 % 50 mL IVPB (mini-bag)  3.375 g Intravenous Q8H Darra Cons, DO        furosemide (LASIX) injection 40 mg  40 mg

## 2018-10-25 NOTE — PROGRESS NOTES
Neurology Follow up    SUBJECTIVE:  NO Headache, double vision,blurry vision,difficulty with speech,difficulty with swallowing,weakness,numbness,pain,nausea,vomitting,chocking,neck pain,dizziness,not reliable     PHYSICAL EXAM:    /70   Pulse 83   Temp 98.6 °F (37 °C) (Oral)   Resp (!) 31   Ht 6' 1\" (1.854 m)   Wt 228 lb (103.4 kg) Comment: bedscale  SpO2 93%   BMI 30.08 kg/m²    General Appearance:      Mental Status Exam:             Level of Alertness:   awake            Orientation:   person,           Attention/Concentration:  normal            Language:  normal      Funduscopic Exam:     Cranial Nerves            Cranial nerve III           Pupils:  equal, round, reactive to light      Cranial nerves III, IV, VI           Extraocular Movements: intact.  No nystagmus      Cranial nerve V           Facial sensation:  intact      Cranial nerve VII           Facial strength: intact      Cranial nerve VIII           Hearing:  intact        Motor:    Drift:  absent  Motor exam is symmetrical 4+ out of 5 all extremities bilaterally  Tone:  normal  Abnormal Movements:  Absent/ dysmetria left            Sensory:        Pinprick             Right Upper Extremity:  normal             Left Upper Extremity:  normal             Right Lower Extremity:  normal             Left Lower Extremity:  normal           Vibration                         Touch            Proprioception                 Coordination:           Finger/Nose   Right:  normal              Left:  normal          Heel-Knee-Shin                Right:  normal              Left:  normal          Rapid Alternating Movements              Right:  normal              Left:  normal          Gait:                       Casual:  Gait to be tested                       Romberg:  Reflexes:             Deep Tendon Reflexes:             Reflexes are 2 +             Plantar response:                Right:  downgoing               Left:  downgoing    Vascular: The visualized osseous structures are unremarkable. The visualized portion of the paranasal sinuses, and mastoids are unremarkable. Surgical staples overlying the right occipital region. 1. INTERVAL EVOLUTION, PROGRESSION OF A LEFT INFERIOR CEREBELLAR HEMISPHERE INFARCT WITH BOTH VASOGENIC EDEMA AND INCREASING HEMORRHAGIC CONVERSION. All CT scans at this facility use dose modulation, iterative reconstruction, and/or weight based dosing when appropriate to reduce radiation dose to as low as reasonably achievable. Ct Head Wo Contrast    Result Date: 10/22/2018  CT Brain Contrast medium:  Not utilized. History:  Confusion. Omaira Gonzalez in ER after being discharged. Comparison:  CT brain obtained earlier same day. Findings: Extra-axial spaces:  Normal. Intracranial hemorrhage:  None. Ventricular system: Ventricles mildly enlarged. Sulci mildly prominent. Basal Cisterns:  Normal. Cerebral Parenchyma: Geographic area of decreased attenuation exerting no mass effect, left frontal lobe, and right cerebellum. Midline Shift:  None. Cerebellum: Interval development of punctate areas of increased attenuation within ill-defined area of low attenuation left cerebellum (series 602, image 27). Paranasal sinuses and mastoid air cells:  Normal. Visualized Orbits:  Normal.     Impression: Interval development of petechial hemorrhage, within area of acute/subacute left cerebellar infarct. 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 Mild cerebral atrophy. All CT scans at this facility use dose modulation, iterative reconstruction, and/or weight based dosing when appropriate to reduce radiation dose to as low as reasonably achievable. Ct Head Wo Contrast    Result Date: 10/22/2018  CT HEAD WO CONTRAST, CT CERVICAL SPINE WO CONTRAST: 10/22/2018 CLINICAL HISTORY:  fall/ scalp laceration . COMPARISON: Head CT 8/11/2017 and neck CT 1/21/2017.  TECHNIQUE: ROUTINE All CT scans at this facility use dose modulation, iterative reconstruction, and/or weight based dosing when appropriate to reduce radiation dose to as low as reasonably achievable. HEAD CT FINDINGS: A small area of scalp soft tissue swelling is noted posteriorly on the right. A moderate to large sized geographic hypodensity consistent with a recent ischemic infarct is noted within the inferior aspect of the left cerebellar hemisphere. Small old infarcts within the left frontal lobe and right cerebellar hemisphere, and mild generalized age-related atrophic changes appear unchanged. There is no intracranial hemorrhage, significant midline shift, extra-axial collection, hydrocephalus, other recent other recent ischemic infarct or skull fracture identified. CERVICAL SPINE CT FINDINGS: The spine is visualized from the craniovertebral junction through the T1-2 level. Mild to moderate degenerative endplate and hypertrophic facet changes appear unchanged from the previous neck CT, with minimal degenerative retrolisthesis of C3 over C4, and C5 over C6. There is no fracture, other significant subluxation, or acute paraspinous soft tissue abnormalities identified. FINAL IMPRESSION: MODERATE-SIZED RECENT-APPEARING LEFT CEREBELLAR HEMISPHERE ISCHEMIC INFARCT. NO INTRACRANIAL HEMORRHAGE OR EVIDENCE OF SIGNIFICANT CERVICAL SPINE INJURY. MILD AGE-RELATED ATROPHIC CHANGES AND SMALL OLD LEFT FRONTAL LOBE AND RIGHT CEREBELLAR HEMISPHERE INFARCTS. MILD CERVICAL SPONDYLOSIS. Ct Cervical Spine Wo Contrast    Result Date: 10/22/2018  CT HEAD WO CONTRAST, CT CERVICAL SPINE WO CONTRAST: 10/22/2018 CLINICAL HISTORY:  fall/ scalp laceration . COMPARISON: Head CT 8/11/2017 and neck CT 1/21/2017. TECHNIQUE: ROUTINE All CT scans at this facility use dose modulation, iterative reconstruction, and/or weight based dosing when appropriate to reduce radiation dose to as low as reasonably achievable. HEAD CT FINDINGS: A small area of scalp soft tissue swelling is noted posteriorly on the right.  A

## 2018-10-25 NOTE — DISCHARGE INSTR - COC
Catheter: Removal Date 11-2-18   Colostomy/Ileostomy/Ileal Conduit: No       Date of Last BM: 11-2-18  No intake or output data in the 24 hours ending 10/25/18 1206  I/O last 3 completed shifts: In: 500 [P.O.:300; I.V.:200]  Out: 1350 [Urine:1350]    Safety Concerns: At Risk for Falls    Impairments/Disabilities:      Hearing    Nutrition Therapy:  Current Nutrition Therapy:   - Oral Diet:  Dysphagia 2 mechanically altered    Routes of Feeding: Oral  Liquids: Thin Liquids  Daily Fluid Restriction: no  Last Modified Barium Swallow with Video (Video Swallowing Test): done on 10-23-18/***    Treatments at the Time of Hospital Discharge:   Respiratory Treatments: ***  Oxygen Therapy:  is on oxygen at 3 L/min per nasal cannula.   Ventilator:    - No ventilator support    Rehab Therapies: Physical Therapy and Occupational Therapy  Weight Bearing Status/Restrictions: No weight bearing restirctions  Other Medical Equipment (for information only, NOT a DME order):  walker  Other Treatments:     Patient's personal belongings (please select all that are sent with patient):  Sathya escudero    RN SIGNATURE:  Electronically signed by Zara Estrella RN on 11/2/18 at 3:10 PM    CASE MANAGEMENT/SOCIAL WORK SECTION    Inpatient Status Date: ***    Readmission Risk Assessment Score:  Readmission Risk              Risk of Unplanned Readmission:        28           Discharging to Facility/ Agency   · Name: [facility]  · Address:  · Phone:[phone]  · Fax:    Dialysis Facility (if applicable)   · Name:  · Address:  · Dialysis Schedule:  · Phone:  · Fax:    / signature: Electronically signed by SHANEKA Camilo on 10/29/18 at 3:10 PM.Electronically signed by SHANEKA Garcia on 10/31/2018 at 12:15 PM      PHYSICIAN SECTION    Prognosis: good     Condition at Discharge: Stable    Rehab Potential (if transferring to Rehab): Good    Recommended Labs or Other Treatments After Discharge: none

## 2018-10-25 NOTE — PROGRESS NOTES
occipital region. 1. INTERVAL EVOLUTION, PROGRESSION OF A LEFT INFERIOR CEREBELLAR HEMISPHERE INFARCT WITH BOTH VASOGENIC EDEMA AND INCREASING HEMORRHAGIC CONVERSION. All CT scans at this facility use dose modulation, iterative reconstruction, and/or weight based dosing when appropriate to reduce radiation dose to as low as reasonably achievable. Ct Head Wo Contrast    Result Date: 10/22/2018  CT Brain Contrast medium:  Not utilized. History:  Confusion. Tatyana Chang in ER after being discharged. Comparison:  CT brain obtained earlier same day. Findings: Extra-axial spaces:  Normal. Intracranial hemorrhage:  None. Ventricular system: Ventricles mildly enlarged. Sulci mildly prominent. Basal Cisterns:  Normal. Cerebral Parenchyma: Geographic area of decreased attenuation exerting no mass effect, left frontal lobe, and right cerebellum. Midline Shift:  None. Cerebellum: Interval development of punctate areas of increased attenuation within ill-defined area of low attenuation left cerebellum (series 602, image 27). Paranasal sinuses and mastoid air cells:  Normal. Visualized Orbits:  Normal.     Impression: Interval development of petechial hemorrhage, within area of acute/subacute left cerebellar infarct. 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 Mild cerebral atrophy. All CT scans at this facility use dose modulation, iterative reconstruction, and/or weight based dosing when appropriate to reduce radiation dose to as low as reasonably achievable. Ct Head Wo Contrast    Result Date: 10/22/2018  CT HEAD WO CONTRAST, CT CERVICAL SPINE WO CONTRAST: 10/22/2018 CLINICAL HISTORY:  fall/ scalp laceration . COMPARISON: Head CT 8/11/2017 and neck CT 1/21/2017. TECHNIQUE: ROUTINE All CT scans at this facility use dose modulation, iterative reconstruction, and/or weight based dosing when appropriate to reduce radiation dose to as low as reasonably achievable.  HEAD CT FINDINGS: A small area of scalp soft tissue infarcts within the left frontal lobe and right cerebellar hemisphere, and mild generalized age-related atrophic changes appear unchanged. There is no intracranial hemorrhage, significant midline shift, extra-axial collection, hydrocephalus, other recent other recent ischemic infarct or skull fracture identified. CERVICAL SPINE CT FINDINGS: The spine is visualized from the craniovertebral junction through the T1-2 level. Mild to moderate degenerative endplate and hypertrophic facet changes appear unchanged from the previous neck CT, with minimal degenerative retrolisthesis of C3 over C4, and C5 over C6. There is no fracture, other significant subluxation, or acute paraspinous soft tissue abnormalities identified. FINAL IMPRESSION: MODERATE-SIZED RECENT-APPEARING LEFT CEREBELLAR HEMISPHERE ISCHEMIC INFARCT. NO INTRACRANIAL HEMORRHAGE OR EVIDENCE OF SIGNIFICANT CERVICAL SPINE INJURY. MILD AGE-RELATED ATROPHIC CHANGES AND SMALL OLD LEFT FRONTAL LOBE AND RIGHT CEREBELLAR HEMISPHERE INFARCTS. MILD CERVICAL SPONDYLOSIS. Ct Abdomen Pelvis W Iv Contrast Additional Contrast? None    Result Date: 10/21/2018  EXAM: CT of the abdomen and pelvis with contrast History: Postprandial upper abdominal pain Technique: Multiple contiguous axial images were obtained of the abdomen and pelvis from an level of the lung bases through the initial tuberosities with IV contrast. Multiplanar reformats were obtained. Comparison: CT abdomen and pelvis from July 1, 2016 Findings: Small bilateral pleural effusions with patchy right middle lobe, right lower lobe, and left lower lobe groundglass opacities. Heart size is enlarged. Pacemaker leads noted. No significant pericardial effusion. A few calcified gallstones are noted. Gallbladder is otherwise within normal limits. The liver, pancreas, and stomach appear within normal limits. Unchanged appearance of the bilateral adrenal glands.  A few small ossifications are identified within the spleen, most compatible with sequela of remote granulomatous disease. IVC filter is noted. Again identified are multiple bilateral renal cysts that do not appear significantly changed from prior examination, the largest on the right measuring 4.7 cm and the largest on the left measuring 3.5 cm. Kidneys enhance uniformly. No urinary tract calculi or hydronephrosis. Urinary bladder is well distended. Prostate is mildly enlarged and demonstrates a few internal calcifications. Small left inguinal hernia. Abdominal aorta is nonaneurysmal  and demonstrates mild atherosclerotic calcification . Celiac axis, superior mesenteric artery, and inferior mesenteric artery are patent. No retroperitoneal or abdominal/pelvic lymphadenopathy. No small bowel obstruction. A few colonic diverticuli are identified. No overt colonic mass or pericolonic inflammation. Appendix is within normal limits. No free fluid or free air. Degenerative changes are present of the spine and both hips. No acute intra-abdominal process. Small bilateral pleural effusions and scattered groundglass opacities in the lung bases other relating to infiltrate or atelectasis. Colonic diverticulosis without diverticulitis , cholelithiasis, an additional chronic findings as above. . All CT scans at this facility use dose modulation, iterative reconstruction, and/or weight based dosing when appropriate to reduce radiation dose to as low as reasonably achievable. Xr Chest Portable    Result Date: 10/25/2018  EXAMINATION: CHEST PORTABLE VIEW  CLINICAL HISTORY: Hypoxia COMPARISONS: October 22, 2018  FINDINGS: Single  views of the chest is submitted. There is a left-sided ICD device. Leads overlying the cardiac silhouette. Unchanged. There are multiple median sternotomy wires. The cardiac silhouette is enlarged unchanged configuration. .  The mediastinum is unremarkable. Pulmonary vascular remains congested with increased interstitial markings. . Right sided trachea.

## 2018-10-25 NOTE — CONSULTS
times daily   Yes Historical Provider, MD   melatonin 3 MG TABS tablet Take 3 mg by mouth nightly   Yes Historical Provider, MD   diphenhydrAMINE (BENADRYL) 25 MG tablet Take 25 mg by mouth every evening   Yes Historical Provider, MD   famotidine (PEPCID) 20 MG tablet Take 1 tablet by mouth 2 times daily 10/21/18   Kenneth Scott PA-C   LORazepam (ATIVAN) 0.5 MG tablet Take 1 tablet by mouth every 6 hours as needed for Anxiety for up to 30 days. . 10/21/18 11/20/18  Kenneth Scott PA-C   FLUoxetine (PROZAC) 40 MG capsule Take 20 mg by mouth daily  7/18/18   Historical Provider, MD   ipratropium (ATROVENT) 0.03 % nasal spray 2 sprays by Nasal route 2 times daily 4/25/18   Historical Provider, MD   amiodarone (CORDARONE) 200 MG tablet Take 100 mg by mouth daily 7/18/18   Historical Provider, MD   furosemide (LASIX) 40 MG tablet Take 40 mg by mouth daily    Historical Provider, MD   spironolactone (ALDACTONE) 25 MG tablet Take 12.5 mg by mouth daily    Historical Provider, MD   trifluoperazine (STELAZINE) 2 MG tablet Take 2 mg by mouth nightly    Historical Provider, MD   carvedilol (COREG) 12.5 MG tablet Take 3.125 mg by mouth 2 times daily  5/22/15   Historical Provider, MD   aspirin (ECOTRIN LOW STRENGTH) 81 MG EC tablet Take 81 tablets by mouth daily 9/24/08   Historical Provider, MD   albuterol sulfate HFA (VENTOLIN HFA) 108 (90 Base) MCG/ACT inhaler Inhale 2 puffs into the lungs every 6 hours as needed for Wheezing 4/2/18   Tammy Otoole MD   simvastatin (ZOCOR) 40 MG tablet Take 0.5 tablets by mouth nightly  Patient taking differently: Take 40 mg by mouth nightly  8/15/17   Amberly Marcelo MD   magnesium oxide (MAG-OX) 400 MG tablet Take 400 mg by mouth daily    Historical Provider, MD   DIGOXIN PO Take 0.125 mcg by mouth Twice a Week every other day    Historical Provider, MD   amiodarone (CORDARONE) 200 MG tablet Take 100 mg by mouth daily     Historical Provider, MD   ALPRAZolam Wileen Ao) 0.25 MG tablet auscultation bilaterally; normal respiratory effort. HEART: Rate and rhythm III/VI RAJ RUSB radiating toward the carotids; no gallop appreciated. There are no rubs, clicks or heaves. PMI nondisplaced. ABDOMEN: Soft, nontender, no palpable hepatosplenomegaly, no mases, no bruits. MUSCULOSKELETAL: Currently on bed rest.   EXTREMITIES: Warm with good color, no clubbing or cyanois. There is 1+ bilateral edema noted. PERIPHERAL VASCULAR: Pulses present and equally palpable; 2+ throughout. No femoral bruits. Diagnostics:    EKG:  Ventricular-paced rhythm  Biventricular pacemaker detected  Abnormal ECG  When compared with ECG of 22-OCT-2018 15:19,  premature ventricular complexes are no longer present  Vent. rate has decreased BY   8 BPM    Telemetry: paced.     Lab Data:  BMP:  Recent Labs      10/23/18   0450  10/24/18   0453  10/25/18   0505  10/25/18   0931   NA  141  143  142   --    K  3.9  4.3  4.2   --    CL  101  105  103   --    CO2  25  27  29   --    BUN  30*  37*  41*   --    CREATININE  0.88  0.73  0.87  1.0   LABGLOM  >60.0  >60.0  >60.0  >60       CBC:  Recent Labs      10/23/18   0450  10/24/18   0453  10/25/18   0505  10/25/18   0931   WBC  9.1  8.2  8.4   --    RBC  4.22*  3.78*  3.77*   --    HGB  12.9*  11.6*  11.7*  12.3*   HCT  38.9*  34.5*  34.3*   --    MCV  92.0  91.2  90.9   --    MCH  30.6  30.8  31.0   --    MCHC  33.3  33.7  34.1   --    RDW  14.0  14.2  13.9   --    PLT  96*  83*  82*   --        Cardiac Enzymes:   Recent Labs      10/25/18   0505   TROPONINI  <0.010       Hepatic Function Panel:  Recent Labs      10/23/18   0450  10/24/18   0453  10/25/18   0505   ALKPHOS  57  47  47   ALT  13  <5  <5   AST  29  27  25   PROT  6.2*  5.4*  5.5*   BILITOT  2.7*  2.4*  2.7*   BILIDIR  0.4*  0.4*  0.4*   LABALBU  3.6*  3.1*  3.2*       Pro-BNP:  Lab Results   Component Value Date    PROBNP 1,978 08/12/2017    PROBNP 3,822 08/11/2017    PROBNP 5,216 11/21/2016       TSH:  Lab Results Component Value Date    TSH 2.780 07/01/2016       Lipid Profile:  Lab Results   Component Value Date    TRIG 161 07/01/2016    HDL 56 07/01/2016    LDLCALC 43 07/01/2016       HgbA1C:  Lab Results   Component Value Date    LABA1C 5.7 09/07/2013       Lactate Level:  Recent Labs      10/25/18   0505   LACTA  1.0       CMP:  Recent Labs      10/23/18   0450  10/24/18   0453  10/25/18   0505  10/25/18   0931   NA  141  143  142   --    K  3.9  4.3  4.2   --    CL  101  105  103   --    CO2  25  27  29   --    BUN  30*  37*  41*   --    CREATININE  0.88  0.73  0.87  1.0   GLUCOSE  124*  113*  131*   --    CALCIUM  8.7  8.3*  8.1*   --    PROT  6.2*  5.4*  5.5*   --    LABALBU  3.6*  3.1*  3.2*   --    BILITOT  2.7*  2.4*  2.7*   --    ALKPHOS  57  47  47   --    AST  29  27  25   --    ALT  13  <5  <5   --        Radiology:     Result Date: 10/22/2018  EXAMINATION: Left RIBS and AP chest. 6 films CLINICAL HISTORY: Trauma. Pain FINDINGS: AP and oblique views of the ribs on the left side and a frontal view of the chest were obtained. BB marker overlies the lower lateral left hemithorax at the site of an acute slightly angled left 10th rib fracture, apex laterally. In addition there are displaced fractures through the posterior aspect of the left seventh, eighth, and ninth ribs. No underlying pneumothorax. No significant parenchymal contusion or pleural effusion appreciated. Cardiomegaly with pacemaker wires in place. Valvular prosthesis overlies the enlarged heart. Mild central vascular congestion bilaterally noted. MULTIPLE LEFT RIB FRACTURES DESCRIBED AND FELT TO BE ACUTE. NO UNDERLYING PNEUMOTHORAX OR SIGNIFICANT CONTUSION OR HEMOPNEUMOTHORAX. CARDIOMEGALY AND VASCULAR CONGESTION.       Result Date: 10/23/2018  CT HEAD WO CONTRAST CLINICAL HISTORY: Cerebellar infarct, follow-up COMPARISON: October 22, 2018 TECHNIQUE: Multiple unenhanced serial axial images of the brain from the vertex of the skull to the base of the skull were performed. FINDINGS: The ventricles are dilated. This is compensatory to the surrounding moderate generalized parenchymal volume loss. No mass. No midline shift. The cisterns are patent. Again note is made of mass effect on the left side of the ambient cistern. Unchanged There are white matter and periventricular changes most likely consistent with chronic small vessel disease. Old left frontal area of infarct encephalomalacia. There is diffuse decreased attenuation with increasing areas of attenuation seen in the left inferior cerebellar cortex consistent with increasing hemorrhagic conversion as compared to the prior study. This is best appreciated on the sagittal reconstructions. No acute  extra-axial findings. The visualized osseous structures are unremarkable. The visualized portion of the paranasal sinuses, and mastoids are unremarkable. Surgical staples overlying the right occipital region. 1. INTERVAL EVOLUTION, PROGRESSION OF A LEFT INFERIOR CEREBELLAR HEMISPHERE INFARCT WITH BOTH VASOGENIC EDEMA AND INCREASING HEMORRHAGIC CONVERSION. All CT scans at this facility use dose modulation, iterative reconstruction, and/or weight based dosing when appropriate to reduce radiation dose to as low as reasonably achievable. Result Date: 10/22/2018  CT HEAD WO CONTRAST, CT CERVICAL SPINE WO CONTRAST: 10/22/2018 CLINICAL HISTORY:  fall/ scalp laceration. FINAL IMPRESSION: MODERATE-SIZED RECENT-APPEARING LEFT CEREBELLAR HEMISPHERE ISCHEMIC INFARCT. NO INTRACRANIAL HEMORRHAGE OR EVIDENCE OF SIGNIFICANT CERVICAL SPINE INJURY. MILD AGE-RELATED ATROPHIC CHANGES AND SMALL OLD LEFT FRONTAL LOBE AND RIGHT CEREBELLAR HEMISPHERE INFARCTS. MILD CERVICAL SPONDYLOSIS. Result Date: 10/21/2018  EXAM: CT of the abdomen and pelvis with contrast History    No acute intra-abdominal process.  Small bilateral pleural effusions and scattered groundglass opacities in the lung bases other relating to

## 2018-10-26 NOTE — PROGRESS NOTES
5 % 250 mL IVPB  1,250 mg Intravenous Q12H Pitney Ricardo, DO   Stopped at 10/26/18 1155    piperacillin-tazobactam (ZOSYN) 3.375 g in dextrose 5 % 50 mL IVPB (mini-bag)  3.375 g Intravenous Q8H Marie Roberts, DO 12.5 mL/hr at 10/26/18 1044 3.375 g at 10/26/18 1044    furosemide (LASIX) injection 40 mg  40 mg Intravenous BID Marisa Guy MD   40 mg at 10/26/18 0940    aspirin chewable tablet 81 mg  81 mg Oral Daily Seth John MD   81 mg at 10/26/18 1044    LORazepam (ATIVAN) injection 0.5 mg  0.5 mg Intravenous Q8H PRN Pitney Ricardo, DO   0.5 mg at 10/26/18 1303    norepinephrine (LEVOPHED) 16 mg in dextrose 5 % 250 mL infusion  2 mcg/min Intravenous Continuous Hillary Lemus MD 7.5 mL/hr at 10/26/18 1052 8 mcg/min at 10/26/18 1052    ipratropium-albuterol (DUONEB) nebulizer solution 1 ampule  1 ampule Inhalation 4x daily Pitney Ricardo, DO   1 ampule at 10/26/18 1118    albuterol sulfate  (90 Base) MCG/ACT inhaler 2 puff  2 puff Inhalation Q2H PRN Stefany Roberts, DO        budesonide (PULMICORT) nebulizer suspension 500 mcg  0.5 mg Nebulization BID Manuel Villa MD   500 mcg at 10/26/18 0416    oxyCODONE-acetaminophen (PERCOCET) 5-325 MG per tablet 1 tablet  1 tablet Oral Q4H PRN Pitney Ricardo, DO   1 tablet at 10/24/18 2153    carbidopa-levodopa (SINEMET)  MG per tablet 1 tablet  1 tablet Oral BID Pitney Ricardo, DO   1 tablet at 10/26/18 0940    famotidine (PEPCID) tablet 20 mg  20 mg Oral BID Pitney Ricardo, DO   20 mg at 10/26/18 0296    FLUoxetine (PROZAC) capsule 20 mg  20 mg Oral Daily Pitney Ricardo, DO   20 mg at 10/26/18 0940    ipratropium (ATROVENT) 0.03 % nasal spray 2 spray  2 spray Nasal BID Pitney Ricardo, DO        LORazepam (ATIVAN) tablet 0.5 mg  0.5 mg Oral Q6H PRN Stefany Roberts, DO   0.5 mg at 10/23/18 1639    melatonin tablet 3 mg  3 mg Oral Nightly Pitney Ricardo, DO   Stopped at 10/25/18 2037    barium sulfate 40 % reconsitutable signed by Connie Rizvi DO on 10/26/2018 at 2:58 PM

## 2018-10-26 NOTE — PROGRESS NOTES
Speech Language Pathology    NAME:  Genny Cesar  ROOM: [facility]  :  8/3/1932  DATE: 10/26/2018      Speech Therapy attempted to see Genny Cesar on this date for a/an:    [x] Bedside Swallow Evaluation   [] Speech/Language Evaluation   [] Modified Barium Swallow   [] Treatment    Pt was unable to be seen due to patient:   [] Currently off unit   [] Refused   [] Too lethargic to participate    [] NPO for testing   [] On Bipap   [x] Nursing Deferred: pt on Bipap and NG tube not appropriate at this time, will check back   [] Other:        Electronically signed by JESUS Yost on 10/26/18 at 9:28 AM

## 2018-10-26 NOTE — PROGRESS NOTES
Neurology Follow up    SUBJECTIVE:  Some SOB, patient off the BiPAP. Patient is improving. Conclusion continuous  But better  stable    PHYSICAL EXAM:    /66   Pulse 86   Temp 99.1 °F (37.3 °C) (Oral)   Resp 23   Ht 6' 1\" (1.854 m)   Wt 223 lb 1.7 oz (101.2 kg)   SpO2 94%   BMI 29.44 kg/m²     General Appearance:      Mental Status Exam:             Level of Alertness:   awake            Orientation:   person,           Attention/Concentration:  normal            Language:  normal      Funduscopic Exam:     Cranial Nerves            Cranial nerve III           Pupils:  equal, round, reactive to light      Cranial nerves III, IV, VI           Extraocular Movements: intact.  No nystagmus      Cranial nerve V           Facial sensation:  intact      Cranial nerve VII           Facial strength: intact      Cranial nerve VIII           Hearing:  intact        Motor:    Drift:  absent  Motor exam is symmetrical 4+ out of 5 all extremities bilaterally  Tone:  normal  Abnormal Movements:  Absent/ dysmetria left            Sensory:        Pinprick             Right Upper Extremity:  normal             Left Upper Extremity:  normal             Right Lower Extremity:  normal             Left Lower Extremity:  normal           Vibration                         Touch            Proprioception                 Coordination:           Finger/Nose   Right:  normal              Left:  normal          Heel-Knee-Shin                Right:  normal              Left:  normal          Rapid Alternating Movements              Right:  normal              Left:  normal          Gait:                       Casual:  Gait to be tested                       Romberg:  Reflexes:             Deep Tendon Reflexes:             Reflexes are 2 +             Plantar response:                Right:  downgoing               Left:  downgoing    Vascular:  Cardiac Exam:  normal         Xr Ribs Left Include Chest (min 3 Views)    Result Date: imaging was performed. FINDINGS: Patient's oral stage was characterized by mild oral dysphagia. There is prolonged mastication. There is decreased AP transit. There is premature pharyngeal spillage to the level of the vallecula. Patient's pharyngeal stage was characterized by mild pharyngeal dysphagia. Swallowing reflex was triggered at the level of the vallecula. There is reduced tongue base retraction with minimal residuals in the vallecula. No penetration or aspiration noted. MILD ORAL AND PHARYNGEAL DYSPHAGIA. RECOMMENDATION BY SPEECH PATHOLOGY TO FOLLOW. Us Carotid Artery Bilateral    Result Date: 10/23/2018  BILATERAL DUPLEX CAROTID ULTRASOUND CLINICAL INFORMATION:  STROKE, ALTERED MENTAL STATUS, CONFUSION, RECENT FALL COMPARISON:  NONE AVAILABLE FINDINGS:  The bilateral carotid duplex ultrasound study demonstrates the following:                                                                RIGHT            LEFT Carotid plaque burden                         moderate               Mild Proximal common carotid artery           58 cm/s            118 cm/s  Mid common carotid artery                   74 cm/s            103 cm/s  Distal common carotid artery                60 cm/s           85 cm/s Proximal internal carotid artery             62 cm/s            59 cm/s Mid internal carotid artery                      62 cm/s           49 cm/s Distal internal carotid artery                  69 cm/s             59 cm/s External carotid artery                            100 cm/s           115 cm/s    ICA/CCA ratio                                         0.94                0.57   Vertebral arteries antegrade flow         Yes                     Yes     1. RIGHT INTERNAL CAROTID ARTERY: <50% STENOSIS. 2. LEFT INTERNAL CAROTID ARTERY: <50% STENOSIS. 3. MILD TO MODERATE CAROTID PLAQUE BURDEN IN THE CAROTID BIFURCATIONS IN THE NECK. 4. BILATERALLY PATENT VERTEBRAL ARTERIES WITH ANTEGRADE BLOOD FLOW.  Validated velocity measurements with angiographic measurements, velocity criteria are extrapolated from diameter data as defined by the Society of Radiologist in [address] [place] [address] Drive Radiology 2003; [phone].        Recent Labs      10/21/18   1610  10/22/18   1345  10/23/18   0450   WBC  4.0*  10.0  9.1   HGB  13.6*  13.6*  12.9*   PLT  110*  124*  96*     Recent Labs      10/21/18   1610  10/22/18   1345  10/23/18   0450   NA  141  141  141   K  4.1  4.4  3.9   CL  99  99  101   CO2  31*  27  25   BUN  19  23  30*   CREATININE  1.02  1.10  0.88   GLUCOSE  113*  115*  124*     Recent Labs      10/21/18   1610  10/22/18   1345  10/23/18   0450   BILITOT  1.3*  2.5*  2.7*   ALKPHOS  74  70  57   AST  21  26  29   ALT  13  14  13     Lab Results   Component Value Date    PROTIME 12.5 10/22/2018    INR 1.2 10/22/2018     No results found for: LITHIUM, DILFRTOT, VALPROATE    ASSESSMENT AND PLAN    Left cerebellar infarct , mass effect nine  Petechial bleed  resp dysfunction  CT sporal , no PE  Repeat CT seen  4th ventricle very open,   Gait ataxia prominent  Left dysmetria  PD tremors, gross/ sinemet  Keep off antiplatelets 2 days  CTA  ni large surgical issues  Antiplatelets started  No lovenox, low platelets  [facility]

## 2018-10-26 NOTE — PROGRESS NOTES
criteria are extrapolated from diameter data as defined by the Society of Radiologist in [address] [place] [address] Drive Radiology 2003; [phone]. Recent Labs      10/21/18   1610  10/22/18   1345  10/23/18   0450   WBC  4.0*  10.0  9.1   HGB  13.6*  13.6*  12.9*   PLT  110*  124*  96*     Recent Labs      10/21/18   1610  10/22/18   1345  10/23/18   0450   NA  141  141  141   K  4.1  4.4  3.9   CL  99  99  101   CO2  31*  27  25   BUN  19  23  30*   CREATININE  1.02  1.10  0.88   GLUCOSE  113*  115*  124*     Recent Labs      10/21/18   1610  10/22/18   1345  10/23/18   0450   BILITOT  1.3*  2.5*  2.7*   ALKPHOS  74  70  57   AST  21  26  29   ALT  13  14  13     Lab Results   Component Value Date    PROTIME 12.5 10/22/2018    INR 1.2 10/22/2018     No results found for: LITHIUM, DILFRTOT, VALPROATE    ASSESSMENT AND PLAN    Left cerebellar infarct , mass effect nine  Petechial bleed  resp dysfunction  CT sporal , no PE  Repeat CT seen  4th ventricle very open,   Gait ataxia prominent  Left dysmetria  PD tremors, gross/ sinemet  Keep off antiplatelets 2 days  CTA  ni large surgical issues  Antiplatelets started  No lovenox, low platelets  Rehab soon    Chetan Thompson MD, [place] Arnold, American Board of Psychiatry & Neurology  Board Certified in Vascular Neurology  Board Certified in Neuromuscular Medicine  Certified in Danielle Ram[address]rez 38

## 2018-10-26 NOTE — PROGRESS NOTES
 lisinopril  2.5 mg Oral Daily    melatonin  3 mg Oral Nightly    sodium chloride flush  10 mL Intravenous 2 times per day    LORazepam  1 mg Intravenous Once    atorvastatin  40 mg Oral Nightly       INTRAVENOUS MEDICATIONS       norepinephrine 8 mcg/min (10/26/18 1052)         ASSESSMENT     1) Acute cerebellar stroke with ataxia and accidental fall. Petechial hemorrhage noted on CT. 2) Hypotension. On IV norepinephrine. 3) Acute hypoxic respiratory failure secondary to acute on chronic systolic congestive heart failure. He had been receiving IV fluids. 4) Dysphagia. High risk of aspiration. 5) Permanent atrial fibrillation. 6) Bilateral basilar pneumonia     7) Parkinson's disease with prominent tremors     8) Chronic amiodarone therapy for ventricular arrhythmias. 9) Underlying nonischemic cardiomyopathy with severe LV dysfunction. 10) Status post aortic valve replacement with bioprosthetic valve. PLAN      Continue high flow oxygen. Continue IV lasix. Titrate norepinephrine. Wean off as tolerated. Hold coreg and lisinopril. IV antibiotics. Please do not hesitate to call with questions.   Electronically signed by Frances Ho MD, [facility] on 10/26/2018 at 12:17 PM

## 2018-10-26 NOTE — PROGRESS NOTES
Goals: 1 week  Goal 1: Pt will complete oral motor ROM and strengthening exercises with 80% accuracy, given cues as needed, in order to strengthen lingual/labial/buccal musculature to promote safety and efficiency of oral phase of swallow and decrease risk for pocketing. Goal 2: Pt will complete lingual exercises that promote anterior to posterior propulsion of bolus and improve tongue base retraction with 80% accuracy in order to strengthen the muscles of the swallow to decrease risk of aspiration and to increase ability to safely handle the least restrictive diet level. Goal 3: Pt will complete pharyngeal strengthening exercises (such as the [place], Effortful swallow, etc) with 80% acc in order to strengthen and establish and more effective swallow. Goal 4: pt will tolerate trials of nectar with 5/5x no overt s/s of aspiration  Goal 5: pt will tolerate thermal gustatory stimulation to improve swallow initiation and will initiate a swallow within 2-3 seconds 5/5x  Long-term Goals  Timeframe for Long-term Goals: 1 week  Goal 1: Pt will tolerate least restrictive diet without overt s/s of aspiration. Dysphagia Goals: The patient will tolerate recommended diet without observed clinical signs of aspiration      Prognosis       Education  Patient Education: Results of BSE  Patient Education Response: Needs reinforcement    G-Code:  SLP G-Codes  Functional Limitations: Swallowing  Swallow Current Status (): 100 percent impaired, limited or restricted  Swallow Goal Status ():  At least 60 percent but less than 80 percent impaired, limited or restricted    Pain:  Pain Assessment  Patient Currently in Pain: Denies  Pain Assessment: 0-10  Pain Level: 0  Pain Type: Chronic pain  Pain Location: Back  Pain Orientation: Lower  Pain Radiating Towards: NA  Pain Descriptors: Aching  Pain Frequency: Continuous  Clinical Progression: Not changed  Patient's Stated Pain Goal: No pain  Effect of Pain on Daily Activities:

## 2018-10-26 NOTE — PLAN OF CARE
Call for new or worsening neurologic symptoms or side effects to medication.    Problem: Safety  Goal: No falls during hospitalization  Patient will not fall during hospitalization.      Outcome: Met This Shift      Problem: Skin Integrity - Risk of, Impaired  Goal: No skin breakdown during hospitalization  Outcome: Met This Shift      Problem: NEUROLOGICAL DEFICIT  Goal: Absence of continued neurological deterioration signs and symptoms  Absence of continued neurological deterioration signs and symptoms     Outcome: Met This Shift      Problem: Pain:  Goal: Pain level will decrease  Pain level will decrease   Outcome: Ongoing    Goal: Control of acute pain  Control of acute pain   Outcome: Ongoing    Goal: Control of chronic pain  Control of chronic pain   Outcome: Ongoing

## 2018-10-26 NOTE — PROGRESS NOTES
Pharmacy Note  Vancomycin Consult    Evon Wesley is a 80 y.o. male started on Vancomycin for pneumonia; consult received from Dr. Jose Soliman to manage therapy. Also receiving the following antibiotics: Zosyn. Patient Active Problem List   Diagnosis    Aortic valve defect    [facility]    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), wet ([facility]    Pure hypercholesterolemia    Essential (primary) hypertension    Age related cataract    Parkinson disease ([facility]    Hemoptysis    Pneumonia    Thrombocytopenia ([facility]    Pneumonia of right lower lobe due to infectious organism ([facility]    COPD, mild (HCC)    SOB (shortness of breath)    Dysphagia    Food impaction of esophagus    Stroke-like symptom       Allergies:  Patient has no known allergies. Recent Labs      10/25/18   0505  10/26/18   0450   BUN  41*  34*       Recent Labs      10/25/18   0931  10/26/18   0450   CREATININE  1.0  0.98       Recent Labs      10/25/18   0505  10/26/18   0450   WBC  8.4  6.8         Intake/Output Summary (Last 24 hours) at 10/26/18 1006  Last data filed at 10/26/18 0607   Gross per 24 hour   Intake 597 ml   Output 2650 ml   Net -2053 ml       Ht Readings from Last 1 Encounters:   10/22/18 6' 1\" (1.854 m)        Wt Readings from Last 1 Encounters:   10/26/18 223 lb 1.7 oz (101.2 kg)         Body mass index is 29.44 kg/m². Estimated Creatinine Clearance: 68 mL/min (based on SCr of 0.98 mg/dL). Goal Trough Level: 15-20 mcg/mL    Assessment/Plan:  Will continue vancomycin 1250 mg IV every 12 hours (ordered yesterday by Dr Margaret Castellanos). Will obtain  trough level prior to 4th dose (today-2200 dose). Thank you for the consult. Will follow.     Lit Carroll [facility]  10/26/18  10:12 AM

## 2018-10-26 NOTE — PROGRESS NOTES
clubbing  Neuro: no focal weakness  Skin: Warm and dry. No erythema or rash on exposed extremities.       DATA:   Recent Labs      10/25/18   0505  10/25/18   0931  10/26/18   0450   WBC  8.4   --   6.8   HGB  11.7*  12.3*  11.5*   HCT  34.3*   --   33.9*   MCV  90.9   --   90.6   PLT  82*   --   80*     Recent Labs      10/25/18   0505  10/25/18   0931  10/26/18   0450   NA  142   --   141   K  4.2   --   3.3*   CL  103   --   99   CO2  29   --   31*   BUN  41*   --   34*   CREATININE  0.87  1.0  0.98   GLUCOSE  131*   --   120*   CALCIUM  8.1*   --   8.2*   PROT  5.5*   --   5.3*   LABALBU  3.2*   --   3.0*   BILITOT  2.7*   --   3.9*   ALKPHOS  47   --   44   AST  25   --   22   ALT  <5   --   11   LABGLOM  >60.0  >60  >60.0   GFRAA  >60.0  >60  >60.0       MV Settings:     FiO2 : (S) 50 %    Recent Labs      10/25/18   0931   PHART  7.369   [id number]  55*   PO2ART  47*   [id number]  31.6*   BEART  6*   [id number]  80*       O2 Device: Bi-PAP  O2 Flow Rate (L/min): 45 L/min    Diet NPO Effective Now     MEDICATIONS during current hospitalization:    Continuous Infusions:   norepinephrine 6 mcg/min (10/26/18 0820)       Scheduled Meds:   potassium chloride  40 mEq Oral Daily    vancomycin (VANCOCIN) intermittent dosing (placeholder)   Other RX Placeholder    vancomycin  1,250 mg Intravenous Q12H    piperacillin-tazobactam  3.375 g Intravenous Q8H    furosemide  40 mg Intravenous BID    aspirin  81 mg Oral Daily    ipratropium-albuterol  1 ampule Inhalation 4x daily    carvedilol  12.5 mg Oral BID    budesonide  0.5 mg Nebulization BID    carbidopa-levodopa  1 tablet Oral BID    famotidine  20 mg Oral BID    FLUoxetine  20 mg Oral Daily    ipratropium  2 spray Nasal BID    lisinopril  2.5 mg Oral Daily    melatonin  3 mg Oral Nightly    sodium chloride flush  10 mL Intravenous 2 times per day    LORazepam  1 mg Intravenous Once    atorvastatin  40 mg Oral Nightly       PRN Meds:LORazepam, albuterol Abdomen Pelvis W Iv Contrast Additional Contrast? None    Result Date: 10/21/2018  EXAM: CT of the abdomen and pelvis with contrast History: Postprandial upper abdominal pain Technique: Multiple contiguous axial images were obtained of the abdomen and pelvis from an level of the lung bases through the initial tuberosities with IV contrast. Multiplanar reformats were obtained. Comparison: CT abdomen and pelvis from July 1, 2016 Findings: Small bilateral pleural effusions with patchy right middle lobe, right lower lobe, and left lower lobe groundglass opacities. Heart size is enlarged. Pacemaker leads noted. No significant pericardial effusion. A few calcified gallstones are noted. Gallbladder is otherwise within normal limits. The liver, pancreas, and stomach appear within normal limits. Unchanged appearance of the bilateral adrenal glands. A few small ossifications are identified within the spleen, most compatible with sequela of remote granulomatous disease. IVC filter is noted. Again identified are multiple bilateral renal cysts that do not appear significantly changed from prior examination, the largest on the right measuring 4.7 cm and the largest on the left measuring 3.5 cm. Kidneys enhance uniformly. No urinary tract calculi or hydronephrosis. Urinary bladder is well distended. Prostate is mildly enlarged and demonstrates a few internal calcifications. Small left inguinal hernia. Abdominal aorta is nonaneurysmal  and demonstrates mild atherosclerotic calcification . Celiac axis, superior mesenteric artery, and inferior mesenteric artery are patent. No retroperitoneal or abdominal/pelvic lymphadenopathy. No small bowel obstruction. A few colonic diverticuli are identified. No overt colonic mass or pericolonic inflammation. Appendix is within normal limits. No free fluid or free air. Degenerative changes are present of the spine and both hips. No acute intra-abdominal process.  Small bilateral pleural

## 2018-10-27 NOTE — PROGRESS NOTES
Cardiology Progress Note      Cardiologist:  Vinay Ruiz MD   Date:  10/27/2018  Patient:  Candi Alvarez  YOB: 1932  MRN:  [id number]   Admit Date:  10/22/2018  Hospital Day: Vi Coy was seen and examined today at bedside. He remains quite confused. Trying to climb out of bed. He was calling out for his wife earlier. He remains on high flow O2. Levophed was weaned off. Currently has systolic blood pressures in the 90's. No chest pain. No obvious distress. Consult HPI:  Candi Alvarez is a 80 y.o.  male patient who is being at the request of Dr. Jamel Kawasaki for inpatient consultation of possible congestive heart failure. He was admitted on 10/22/2018. Previous [address] [place] [address] and [facility] records have been reviewed in detail. He has a history of dilated nonischemic cardiomyopathy with previous severe LV dysfunction. He has aortic valve disease and is status post AVR with #25 Kevin-Peitt bioprosthetic valve along with aortic root reconstruction. He has permanent atrial fibrillation. He is status post previous ICD implant in 2007 and upgrade to a biventricular device in 2008 for refractory class IV congestive heart failure. He had a generator change in January 4187 (Medtronic Concerta device). He has a history of ventricular tachycardia and is maintained chronically on amiodarone. He has a history of PE and DVT and was previously anticoagulated. He developed persistent hemoptysis, however, and he decided to stop Eliquis. His hemoptysis cleared up after that. Cardiac cath in 2014 did not show any significant disease. He has Parksinon's disease, hyperlipidemia, and depression. He was brought to the hospital after he fell backwards into a dresser and got a laceration on the scalp. He states he was bleeding quite profusely. He states he remembers it happening and doesn't think he passed out.  CT of the brain showed an acute reconstruction, and/or weight based dosing when appropriate to reduce radiation dose to as low as reasonably achievable. CT HEAD WO CONTRAST   Final Result   Impression:      No interval change, left hemorrhagic cerebellar infarct. Remote right cerebellar infarct. All CT scans at this facility use dose modulation, iterative reconstruction, and/or weight based dosing when appropriate to reduce radiation dose to as low as reasonably achievable. XR CHEST PORTABLE   Final Result   PULMONARY VASCULAR REMAINS CONGESTED WITH INCREASED INTERSTITIAL MARKINGS SUGGESTING COMPONENT OF CHF. .. WITH SUPERIMPOSED BIBASILAR INFILTRATES CONSOLIDATION WITH BILATERAL PLEURAL EFFUSIONS      FL MODIFIED BARIUM SWALLOW W VIDEO   Final Result      MILD ORAL AND PHARYNGEAL DYSPHAGIA. RECOMMENDATION BY SPEECH PATHOLOGY TO FOLLOW. CT HEAD WO CONTRAST   Final Result      1. INTERVAL EVOLUTION, PROGRESSION OF A LEFT INFERIOR CEREBELLAR HEMISPHERE INFARCT WITH BOTH VASOGENIC EDEMA AND INCREASING HEMORRHAGIC CONVERSION. All CT scans at this facility use dose modulation, iterative reconstruction, and/or weight based dosing when appropriate to reduce radiation dose to as low as reasonably achievable. US CAROTID ARTERY BILATERAL   Final Result   1. RIGHT INTERNAL CAROTID ARTERY: <50% STENOSIS. 2. LEFT INTERNAL CAROTID ARTERY: <50% STENOSIS. 3. MILD TO MODERATE CAROTID PLAQUE BURDEN IN THE CAROTID BIFURCATIONS IN THE NECK. 4. BILATERALLY PATENT VERTEBRAL ARTERIES WITH ANTEGRADE BLOOD FLOW. Validated velocity measurements with angiographic measurements, velocity criteria are extrapolated from diameter data as defined by the Society of Radiologist in [address] [place] [address] Radiology 2003; [id number]. XR RIBS LEFT INCLUDE CHEST (MIN 3 VIEWS)   Final Result   MULTIPLE LEFT RIB FRACTURES DESCRIBED AND FELT TO BE ACUTE.       NO UNDERLYING PNEUMOTHORAX OR SIGNIFICANT CONTUSION OR HEMOPNEUMOTHORAX. CARDIOMEGALY AND VASCULAR CONGESTION. CT Head WO Contrast   Final Result   Impression:      Interval development of petechial hemorrhage, within area of acute/subacute left cerebellar infarct. 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 Mild cerebral atrophy. All CT scans at this facility use dose modulation, iterative reconstruction, and/or weight based dosing when appropriate to reduce radiation dose to as low as reasonably achievable. XR CHEST PORTABLE   Final Result   STABLE CARDIOMEGALY. BIBASILAR SUBSEGMENTAL ATELECTASIS/PNEUMONIA. SMALL EFFUSIONS. CT Head WO Contrast   Final Result   FINAL IMPRESSION:      MODERATE-SIZED RECENT-APPEARING LEFT CEREBELLAR HEMISPHERE ISCHEMIC INFARCT. NO INTRACRANIAL HEMORRHAGE OR EVIDENCE OF SIGNIFICANT CERVICAL SPINE INJURY. MILD AGE-RELATED ATROPHIC CHANGES AND SMALL OLD LEFT FRONTAL LOBE AND RIGHT CEREBELLAR HEMISPHERE INFARCTS. MILD CERVICAL SPONDYLOSIS. CT CERVICAL SPINE WO CONTRAST   Final Result   FINAL IMPRESSION:      MODERATE-SIZED RECENT-APPEARING LEFT CEREBELLAR HEMISPHERE ISCHEMIC INFARCT. NO INTRACRANIAL HEMORRHAGE OR EVIDENCE OF SIGNIFICANT CERVICAL SPINE INJURY. MILD AGE-RELATED ATROPHIC CHANGES AND SMALL OLD LEFT FRONTAL LOBE AND RIGHT CEREBELLAR HEMISPHERE INFARCTS. MILD CERVICAL SPONDYLOSIS.                       CURRENT MEDICATIONS       potassium chloride  10 mEq Intravenous Q1H    potassium chloride  40 mEq Oral Daily    vancomycin (VANCOCIN) intermittent dosing (placeholder)   Other RX Placeholder    vancomycin  1,250 mg Intravenous Q12H    piperacillin-tazobactam  3.375 g Intravenous Q8H    furosemide  40 mg Intravenous BID    aspirin  81 mg Oral Daily    ipratropium-albuterol  1 ampule Inhalation 4x daily    budesonide  0.5 mg Nebulization BID    carbidopa-levodopa  1 tablet Oral BID    famotidine  20 mg Oral BID    FLUoxetine 20 mg Oral Daily    ipratropium  2 spray Nasal BID    melatonin  3 mg Oral Nightly    sodium chloride flush  10 mL Intravenous 2 times per day    LORazepam  1 mg Intravenous Once    atorvastatin  40 mg Oral Nightly       INTRAVENOUS MEDICATIONS       norepinephrine Stopped (10/27/18 0316)       ASSESSMENT     1) Acute cerebellar stroke with ataxia and accidental fall. Petechial hemorrhage noted on CT. 2) Hypotension. Levophed weaned off this am.  Trending toward volume depletion. 3) Acute hypoxic respiratory failure secondary to acute on chronic systolic congestive heart failure. Earlier in the admission he had been receiving IV fluids. 4) Dysphagia. Probable aspiration. 5) Permanent atrial fibrillation. 6) Bilateral basilar pneumonia     7) Parkinson's disease with prominent tremors     8) Chronic amiodarone therapy for ventricular arrhythmias. Had previous ICD shocks for VT. 9) Underlying nonischemic cardiomyopathy with severe LV dysfunction. 10) Status post aortic valve replacement with bioprosthetic valve. 11) Thrombocytopenia    12) Hypokalemia         PLAN     He received IV lasix this morning  Will hold further dosing for now  Supplement K+  Continue high flow oxygen. Continue IV lasix. Resume amiodarone. Doubt amiodarone toxicity as a cause of this episode of respiratory failure  Continue IV antibiotics  Resume antiplatelet therapy  Not able to use warfarin or NOAC secondary to recurrent hemoptysis       Please do not hesitate to call with questions.   Electronically signed by Narda King MD, [facility] on 10/27/2018 at 8:26 AM

## 2018-10-27 NOTE — PROGRESS NOTES
base retraction with minimal residuals in the vallecula. No penetration or aspiration noted. MILD ORAL AND PHARYNGEAL DYSPHAGIA. RECOMMENDATION BY SPEECH PATHOLOGY TO FOLLOW. Us Carotid Artery Bilateral    Result Date: 10/23/2018  BILATERAL DUPLEX CAROTID ULTRASOUND CLINICAL INFORMATION:  STROKE, ALTERED MENTAL STATUS, CONFUSION, RECENT FALL COMPARISON:  NONE AVAILABLE FINDINGS:  The bilateral carotid duplex ultrasound study demonstrates the following:                                                                RIGHT            LEFT Carotid plaque burden                         moderate               Mild Proximal common carotid artery           58 cm/s            118 cm/s  Mid common carotid artery                   74 cm/s            103 cm/s  Distal common carotid artery                60 cm/s           85 cm/s Proximal internal carotid artery             62 cm/s            59 cm/s Mid internal carotid artery                      62 cm/s           49 cm/s Distal internal carotid artery                  69 cm/s             59 cm/s External carotid artery                            100 cm/s           115 cm/s    ICA/CCA ratio                                         0.94                0.57   Vertebral arteries antegrade flow         Yes                     Yes     1. RIGHT INTERNAL CAROTID ARTERY: <50% STENOSIS. 2. LEFT INTERNAL CAROTID ARTERY: <50% STENOSIS. 3. MILD TO MODERATE CAROTID PLAQUE BURDEN IN THE CAROTID BIFURCATIONS IN THE NECK. 4. BILATERALLY PATENT VERTEBRAL ARTERIES WITH ANTEGRADE BLOOD FLOW. Validated velocity measurements with angiographic measurements, velocity criteria are extrapolated from diameter data as defined by the Society of Radiologist in [address] [place] [address] Drive Radiology 2003; 185;671-644. IMPRESSION AND SUGGESTION:  1.  Acute on chronic hypoxemic and hypercapnic respiratory failure, patient is stable with High flow humidified oxygen via cannula alternating

## 2018-10-27 NOTE — PROGRESS NOTES
Physician Progress Note    10/27/2018   2:00 PM    Name:  Pilo Potts  MRN:    [id number]     IP Day: 5     Admit Date: 10/22/2018 10:40 AM  PCP: Nick Uriostegui DO    Code Status:  Full Code    Subjective:     Feels tired. Still in ICU on high flow nasal canula. No chest pain.      Physical Examination:      Vitals:  BP (!) 105/56   Pulse 80   Temp 97.7 °F (36.5 °C) (Axillary)   Resp 24   Ht 6' 1\" (1.854 m)   Wt 223 lb 1.7 oz (101.2 kg)   SpO2 95%   BMI 29.44 kg/m²   Temp (24hrs), Av.9 °F (37.2 °C), Min:97.7 °F (36.5 °C), Max:99.7 °F (37.6 °C)      General appearance: alert, cooperative and no distress  Mental Status: oriented to person, place and time and normal affect  Lungs: diminished bilaterally, normal effort  Heart: regular rate and rhythm, no murmur  Abdomen: soft, nontender, nondistended, bowel sounds present, no masses  Extremities: 1+ pitting edema, redness, tenderness in the calves  Skin: no gross lesions, rashes    Data:     Labs:  Recent Labs      10/26/18   0450  10/27/18   0447   WBC  6.8  4.9   HGB  11.5*  10.9*   PLT  80*  75*     Recent Labs      10/26/18   0450  10/27/18   0447   NA  141  141   K  3.3*  2.9*   CL  99  97*   CO2  31*  32*   BUN  34*  32*   CREATININE  0.98  0.82   GLUCOSE  120*  105     Recent Labs      10/26/18   0450  10/27/18   0447   AST  22  25   ALT  11  <5   BILITOT  3.9*  4.2*   ALKPHOS  44  45       Current Facility-Administered Medications   Medication Dose Route Frequency Provider Last Rate Last Dose    amiodarone (CORDARONE) tablet 200 mg  200 mg Oral Daily Sapna St MD   200 mg at 10/27/18 1222    potassium chloride 20 MEQ/15ML (10%) oral solution 40 mEq  40 mEq Oral Daily Ana Paula Coleman DO   40 mEq at 10/27/18 0844    vancomycin (VANCOCIN) intermittent dosing (placeholder)   Other RX Placeholder Pardeep Crouch MD        metoprolol (LOPRESSOR) injection 2.5 mg  2.5 mg Intravenous Q4H PRN Sapna St MD        vancomycin [facility]) 1,250 mg in dextrose 5 % 250 mL IVPB  1,250 mg Intravenous Q12H [facility],  mL/hr at 10/27/18 1211 1,250 mg at 10/27/18 1211    piperacillin-tazobactam (ZOSYN) 3.375 g in dextrose 5 % 50 mL IVPB (mini-bag)  3.375 g Intravenous Q8H Marie Roberts, DO 12.5 mL/hr at 10/27/18 1057 3.375 g at 10/27/18 1057    aspirin chewable tablet 81 mg  81 mg Oral Daily Briana Green MD   81 mg at 10/27/18 0844    LORazepam (ATIVAN) injection 0.5 mg  0.5 mg Intravenous Q8H PRN [facility], DO   0.5 mg at 10/26/18 1303    norepinephrine (LEVOPHED) 16 mg in dextrose 5 % 250 mL infusion  2 mcg/min Intravenous Continuous Karen Gaona MD   Stopped at 10/27/18 0316    ipratropium-albuterol (DUONEB) nebulizer solution 1 ampule  1 ampule Inhalation 4x daily [facility], DO   1 ampule at 10/27/18 1048    albuterol sulfate  (90 Base) MCG/ACT inhaler 2 puff  2 puff Inhalation Q2H PRN Kylah Roberts, DO        budesonide (PULMICORT) nebulizer suspension 500 mcg  0.5 mg Nebulization BID Cira Casper MD   500 mcg at 10/27/18 0351    oxyCODONE-acetaminophen (PERCOCET) 5-325 MG per tablet 1 tablet  1 tablet Oral Q4H PRN [facility], DO   1 tablet at 10/24/18 2153    carbidopa-levodopa (SINEMET)  MG per tablet 1 tablet  1 tablet Oral BID [facility], DO   1 tablet at 10/27/18 0844    famotidine (PEPCID) tablet 20 mg  20 mg Oral BID [facility], DO   20 mg at 10/27/18 0844    FLUoxetine (PROZAC) capsule 20 mg  20 mg Oral Daily [facility], DO   20 mg at 10/27/18 0844    ipratropium (ATROVENT) 0.03 % nasal spray 2 spray  2 spray Nasal BID [facility], DO   2 spray at 10/27/18 1215    LORazepam (ATIVAN) tablet 0.5 mg  0.5 mg Oral Q6H PRN [facility], DO   0.5 mg at 10/26/18 1729    melatonin tablet 3 mg  3 mg Oral Nightly [facility], DO   3 mg at 10/26/18 8460    barium sulfate 40 % reconsitutable suspension  50 mL Oral ONCE PRN [facility], DO        sodium chloride flush 0.9 % injection 10 mL  10 mL Intravenous 2 times per day Nevin Patch THEODORE Ricardo   10 mL at 10/27/18 0853    sodium chloride flush 0.9 % injection 10 mL  10 mL Intravenous PRN Nevin Patch THEODORE Ricardo   10 mL at 10/26/18 1732    magnesium hydroxide (MILK OF MAGNESIA) 400 MG/5ML suspension 30 mL  30 mL Oral Daily PRN Nevin Patch THEODORE Ricardo        ondansetron [facility]) injection 4 mg  4 mg Intravenous Q6H PRN Nevin Patch THEODORE Ricardo        LORazepam (ATIVAN) injection 1 mg  1 mg Intravenous Once Rajni Silva DO        hydrALAZINE (APRESOLINE) injection 10 mg  10 mg Intravenous Q6H PRN Rajni Silva,         atorvastatin (LIPITOR) tablet 40 mg  40 mg Oral Nightly Rajni Silva, DO   40 mg at 10/26/18 2150     Assessment and Plan:          Acute Hospital issues:    # acute hypoxic respiratory failure due to acute on Chronic systolic heart failure and possible aspiration PNA. H/o COPD   - resume IV lasix as ordered  - monitor BP closely, off levophed gtt    - nebs  - resume ACEi and BB with holding parameters   - EF 20%  - cardiology on consult  - resume abx     # acute CVA and petechial hemorrhage post fall in ED in the setting of gait instability   - asa and anticoagulation by neuro  - neurology on consult   - Pt/OT  - resume statin  - SLP   - tele      # dysphagia  - MBS done was on regular diet with thin liquids. - EGD done for concerns for esophageal dysphagia which only showed gastritis. - failed bedside. Needs another eval by SLP. Re-insert NG as he pulled it out    # Multiple left rib fractures, present on admission  - percocet prn     # Cerebral edema de to acute CVA and petechial hemorrhage   \- as above     # afib  - rate control with BB and amio as ordered. No AC, will have cardiology follow post dc.      # h/o aortic valve replacement- bioprosthetic valve     DVT/PPx- scd's       DISCHARGE PLANNING  ICU       Electronically signed by Rajni Silva DO on 10/27/2018

## 2018-10-27 NOTE — FLOWSHEET NOTE
0715 getting hand off of care report. Pt in soft bilat wrist restraints on bipap K+ 2.9 orders in for K+ replacement. 0750 pt put on high flow O2. 1st bag K+ up. Pt resting comfortably with eyes closed,  with no complaints of pain. Lungs diminished. Denies pain. resp even and unlabored. 4445  pt remains resting with eyes closed. BP 93/63 HR AF/ paced temp 98.7 R 22     0920 pt resting in bed. All po meds given per NG    1145 pt resting quietly     1300 pt resting in bed. Sister in to visit. Dr Ana María Rehman in to round soft wrist restraints on.    1700 pt remains resting with eyes closed. Soft wrist restraints in place.  High flow O2 on satting 95-99%

## 2018-10-27 NOTE — PROGRESS NOTES
Neurology Follow up    SUBJECTIVE:  Some SOB, patient off the BiPAP. Patient is improving. Confused off and on No Seizure-Like Activity  Respiratory problems being addressed    PHYSICAL EXAM:    BP (!) 99/55   Pulse 85   Temp 98.2 °F (36.8 °C) (Axillary)   Resp 23   Ht 6' 1\" (1.854 m)   Wt 223 lb 1.7 oz (101.2 kg)   SpO2 96%   BMI 29.44 kg/m²     General Appearance:      Mental Status Exam:             Level of Alertness:   awake            Orientation:   person,           Attention/Concentration:  normal            Language:  normal      Funduscopic Exam:     Cranial Nerves            Cranial nerve III           Pupils:  equal, round, reactive to light      Cranial nerves III, IV, VI           Extraocular Movements: intact.  No nystagmus      Cranial nerve V           Facial sensation:  intact      Cranial nerve VII           Facial strength: intact      Cranial nerve VIII           Hearing:  intact        Motor:    Drift:  absent  Motor exam is symmetrical 4+ out of 5 all extremities bilaterally  Tone:  normal  Abnormal Movements:  Absent/ dysmetria left            Sensory:        Pinprick             Right Upper Extremity:  normal             Left Upper Extremity:  normal             Right Lower Extremity:  normal             Left Lower Extremity:  normal           Vibration                         Touch            Proprioception                 Coordination:           Finger/Nose   Right:  normal              Left:  normal          Heel-Knee-Shin                Right:  normal              Left:  normal          Rapid Alternating Movements              Right:  normal              Left:  normal          Gait:                       Casual:  Gait to be tested                       Romberg:  Reflexes:             Deep Tendon Reflexes:             Reflexes are 2 +             Plantar response:                Right:  downgoing               Left:  downgoing    Vascular:  Cardiac Exam:  normal         Xr Ribs Left Include Chest (min 3 Views)    Result Date: 10/22/2018  EXAMINATION: Left RIBS and AP chest. 6 films CLINICAL HISTORY: Trauma. Pain FINDINGS: AP and oblique views of the ribs on the left side and a frontal view of the chest were obtained. BB marker overlies the lower lateral left hemithorax at the site of an acute slightly angled left 10th rib fracture, apex laterally. In addition there are displaced fractures through the posterior aspect of the left seventh, eighth, and ninth ribs. No underlying pneumothorax. No significant parenchymal contusion or pleural effusion appreciated. Cardiomegaly with pacemaker wires in place. Valvular prosthesis overlies the enlarged heart. Mild central vascular congestion bilaterally noted. MULTIPLE LEFT RIB FRACTURES DESCRIBED AND FELT TO BE ACUTE. NO UNDERLYING PNEUMOTHORAX OR SIGNIFICANT CONTUSION OR HEMOPNEUMOTHORAX. CARDIOMEGALY AND VASCULAR CONGESTION. Ct Head Wo Contrast    Result Date: 10/23/2018  CT HEAD WO CONTRAST CLINICAL HISTORY: Cerebellar infarct, follow-up COMPARISON: October 22, 2018 TECHNIQUE: Multiple unenhanced serial axial images of the brain from the vertex of the skull to the base of the skull were performed. FINDINGS: The ventricles are dilated. This is compensatory to the surrounding moderate generalized parenchymal volume loss. No mass. No midline shift. The cisterns are patent. Again note is made of mass effect on the left side of the ambient cistern. Unchanged There are white matter and periventricular changes most likely consistent with chronic small vessel disease. Old left frontal area of infarct encephalomalacia. There is diffuse decreased attenuation with increasing areas of attenuation seen in the left inferior cerebellar cortex consistent with increasing hemorrhagic conversion as compared to the prior study. This is best appreciated on the sagittal reconstructions. No acute  extra-axial findings.  The visualized osseous structures are

## 2018-10-28 NOTE — PROGRESS NOTES
CRITICAL CARE PROGRESS NOTES    PATIENT NAME: Silke Conde  MRN: [id number]  SERVICE DATE:  October 28, 2018   SERVICE TIME:  11:31 AM      PRIMARY SERVICE: Critical care medicine    CHIEF COMPLAINTS: Weakness and shortness of breath    INTERVAL HPI: Patient seen and examined at bedside, Interval Notes, orders reviewed. Discussed on multidisciplinary rounds  Patient looks much better today. He is alert awake appears comfortable in bed in no distress denies being short of breath at rest.  He does have mild productive cough periodically according to him. His oxygenation continues to improve. His chest x-ray today showed improvement in bilateral infiltrates especially in the bases. His hemodynamic status is relatively stable      OBJECTIVE    Body mass index is 29.44 kg/m². PHYSICAL EXAM:  Vitals:  BP (!) 85/48   Pulse 81   Temp 98.4 °F (36.9 °C) (Oral)   Resp 21   Ht 6' 1\" (1.854 m)   Wt 223 lb 1.7 oz (101.2 kg)   SpO2 96%   BMI 29.44 kg/m²   General: Patient is  Alert, awake . comfortable in bed, No distress. Head: Atraumatic , Normocephalic   Eyes: PERRL. No icteric sclera. No conjunctival injection. No discharge   ENT: No nasal  discharge. Pharynx clear. Neck:  Trachea midline. No thyromegaly, no JVD, No cervical adenopathy. Chest : Adequate effort, symmetric bilateral excursions  Lung : Decreased breath sounds bilaterally, clear anteriorly  Heart[de-identified] Irregular rhythm control rate. No mumur ,  Rub or gallop  ABD: Benign. Non-tender. Non-distended. No masses or organmegaly. Normal bowel sounds. EXT: Improved Pitting edema both lower extremities , No Cyanosis No clubbing  Neuro: no focal weakness  Skin: Warm and dry. No erythema or rash on exposed extremities.       DATA:   Recent Labs      10/27/18   0447  10/28/18   0444  10/28/18   0513   WBC  4.9  6.0   --    HGB  10.9*  11.5*  10.7*   HCT  32.3*  33.8*   --    MCV  90.1  91.2   --    PLT  75*  75*   --      Recent Labs      10/27/18   0447 enlarged. Sulci mildly prominent. Basal Cisterns:  Normal. Cerebral Parenchyma: Geographic area of decreased attenuation exerting no mass effect, left frontal lobe, and right cerebellum. Midline Shift:  None. Cerebellum: Interval development of punctate areas of increased attenuation within ill-defined area of low attenuation left cerebellum (series 602, image 27). Paranasal sinuses and mastoid air cells:  Normal. Visualized Orbits:  Normal.     Impression: Interval development of petechial hemorrhage, within area of acute/subacute left cerebellar infarct. 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 Mild cerebral atrophy. All CT scans at this facility use dose modulation, iterative reconstruction, and/or weight based dosing when appropriate to reduce radiation dose to as low as reasonably achievable. Ct Head Wo Contrast    Result Date: 10/22/2018  CT HEAD WO CONTRAST, CT CERVICAL SPINE WO CONTRAST: 10/22/2018 CLINICAL HISTORY:  fall/ scalp laceration . COMPARISON: Head CT 8/11/2017 and neck CT 1/21/2017. TECHNIQUE: ROUTINE All CT scans at this facility use dose modulation, iterative reconstruction, and/or weight based dosing when appropriate to reduce radiation dose to as low as reasonably achievable. HEAD CT FINDINGS: A small area of scalp soft tissue swelling is noted posteriorly on the right. A moderate to large sized geographic hypodensity consistent with a recent ischemic infarct is noted within the inferior aspect of the left cerebellar hemisphere. Small old infarcts within the left frontal lobe and right cerebellar hemisphere, and mild generalized age-related atrophic changes appear unchanged. There is no intracranial hemorrhage, significant midline shift, extra-axial collection, hydrocephalus, other recent other recent ischemic infarct or skull fracture identified. CERVICAL SPINE CT FINDINGS: The spine is visualized from the craniovertebral junction through the T1-2 level.  Mild to moderate degenerative endplate and hypertrophic facet changes appear unchanged from the previous neck CT, with minimal degenerative retrolisthesis of C3 over C4, and C5 over C6. There is no fracture, other significant subluxation, or acute paraspinous soft tissue abnormalities identified. FINAL IMPRESSION: MODERATE-SIZED RECENT-APPEARING LEFT CEREBELLAR HEMISPHERE ISCHEMIC INFARCT. NO INTRACRANIAL HEMORRHAGE OR EVIDENCE OF SIGNIFICANT CERVICAL SPINE INJURY. MILD AGE-RELATED ATROPHIC CHANGES AND SMALL OLD LEFT FRONTAL LOBE AND RIGHT CEREBELLAR HEMISPHERE INFARCTS. MILD CERVICAL SPONDYLOSIS. Cta Chest W Wo Contrast    Result Date: 10/25/2018  The EXAMINATION: CT scan of the chest with contrast (pulmonary embolism protocol) INDICATION: Chest pain and shortness of breath. COMPARISON: August 10, 3841 TECHNIQUE: Helical CT was performed through the chest utilizing 100 cc of Isovue-370 intravenous contrast.  Images were obtained with bolus tracking in order to opacify the pulmonary arteries. There is no comparison available. Both MIP and 3D volume rendered reconstructions were performed. FINDINGS: There are no findings of central or segmental pulmonary emboli. Visualized lung parenchyma is within normal limits and central airways appear patent. There are patchy multifocal to closing areas of infiltrates throughout the lung parenchyma as well as more consolidative areas within the bases and air bronchograms. No pleural effusions. No pneumothoraces. There is pretracheal, parahilar and subcarinal adenopathy. Visualized osseous structures show multilevel degenerative change with bridging osteophytes superimposed upon a moderate dorsal kyphosis. 1.  NO EVIDENCE OF CENTRAL OR SEGMENTAL PULMONARY EMBOLISM.   2.  DIFFUSE PATCHY MULTIFOCAL TO CONFLUENT AREAS OF AIRSPACE DISEASE THROUGHOUT THE LUNG PARENCHYMA AS DESCRIBED ABOVE All CT scans at this facility use dose modulation, iterative reconstruction, and/or weight based dosing when appropriate to reduce radiation dose to as low as reasonably achievable. Ct Cervical Spine Wo Contrast    Result Date: 10/22/2018  CT HEAD WO CONTRAST, CT CERVICAL SPINE WO CONTRAST: 10/22/2018 CLINICAL HISTORY:  fall/ scalp laceration . COMPARISON: Head CT 8/11/2017 and neck CT 1/21/2017. TECHNIQUE: ROUTINE All CT scans at this facility use dose modulation, iterative reconstruction, and/or weight based dosing when appropriate to reduce radiation dose to as low as reasonably achievable. HEAD CT FINDINGS: A small area of scalp soft tissue swelling is noted posteriorly on the right. A moderate to large sized geographic hypodensity consistent with a recent ischemic infarct is noted within the inferior aspect of the left cerebellar hemisphere. Small old infarcts within the left frontal lobe and right cerebellar hemisphere, and mild generalized age-related atrophic changes appear unchanged. There is no intracranial hemorrhage, significant midline shift, extra-axial collection, hydrocephalus, other recent other recent ischemic infarct or skull fracture identified. CERVICAL SPINE CT FINDINGS: The spine is visualized from the craniovertebral junction through the T1-2 level. Mild to moderate degenerative endplate and hypertrophic facet changes appear unchanged from the previous neck CT, with minimal degenerative retrolisthesis of C3 over C4, and C5 over C6. There is no fracture, other significant subluxation, or acute paraspinous soft tissue abnormalities identified. FINAL IMPRESSION: MODERATE-SIZED RECENT-APPEARING LEFT CEREBELLAR HEMISPHERE ISCHEMIC INFARCT. NO INTRACRANIAL HEMORRHAGE OR EVIDENCE OF SIGNIFICANT CERVICAL SPINE INJURY. MILD AGE-RELATED ATROPHIC CHANGES AND SMALL OLD LEFT FRONTAL LOBE AND RIGHT CEREBELLAR HEMISPHERE INFARCTS. MILD CERVICAL SPONDYLOSIS.      Ct Abdomen Pelvis W Iv Contrast Additional Contrast? None    Result Date: 10/21/2018  EXAM: CT of the abdomen and pelvis with contrast without diverticulitis , cholelithiasis, an additional chronic findings as above. . All CT scans at this facility use dose modulation, iterative reconstruction, and/or weight based dosing when appropriate to reduce radiation dose to as low as reasonably achievable. Xr Chest Portable    Result Date: 10/28/2018  XR CHEST PORTABLE : 10/28/2018 CLINICAL HISTORY:  bilateral infiltrates . COMPARISON: Portable chest and chest CTA 10/25/2018. TECHNIQUE: A portable upright AP radiograph of the chest was obtained. FINDINGS: Since the prior studies, there is been interval placement of a nasogastric tube that extends below the diaphragm out of field of view of the radiograph, likely in good position. Moderate patchy predominantly central pulmonary opacities have mildly improved when compared to the prior study; especially of the right lung base. The heart remains mild to moderately enlarged with mild vascular congestion. Postoperative changes from previous aortic valve replacement and a left subclavian AICD are again noted. There is no significant pleural effusion, pneumothorax, or other changes identified. MILDLY IMPROVED BILATERAL PULMONARY INFILTRATES COMPARED TO 10/25/2018. NASOGASTRIC TUBE IN GOOD APPARENT POSITION. NO OTHER SIGNIFICANT CHANGES IDENTIFIED. Xr Chest Portable    Result Date: 10/25/2018  EXAMINATION: CHEST PORTABLE VIEW  CLINICAL HISTORY: Hypoxia COMPARISONS: October 22, 2018  FINDINGS: Single  views of the chest is submitted. There is a left-sided ICD device. Leads overlying the cardiac silhouette. Unchanged. There are multiple median sternotomy wires. The cardiac silhouette is enlarged unchanged configuration. .  The mediastinum is unremarkable. Pulmonary vascular remains congested with increased interstitial markings. . Right sided trachea. Bibasilar infiltrates consolidation with bilateral pleural effusions. .  No Pneumothoraces. therapy, if remains stable to transfer back to telemetry floor tomorrow    Electronically signed by Pamela Maurer MD, [facility] on 10/28/2018 at 11:31 AM

## 2018-10-29 NOTE — PROGRESS NOTES
10/28/18   0513  10/29/18   0449   NA  144   --   143   K  3.5   --   3.8   CL  100   --   101   CO2  33*   --   32*   BUN  31*   --   31*   CREATININE  0.65*  0.9  0.77   GLUCOSE  95   --   96   CALCIUM  8.2*   --   8.3*   PROT  5.2*   --   5.2*   LABALBU  2.6*   --   2.7*   BILITOT  4.1*   --   4.0*   ALKPHOS  56   --   73   AST  35   --   34   ALT  <5   --   8   LABGLOM  >60.0  >60  >60.0   GFRAA  >60.0  >60  >60.0       MV Settings:     FiO2 : 60 %    Recent Labs      10/28/18   0513   PHART  7.470*   GKA0NAB  50*   PO2ART  92*   RAP8DAH  36.1*   BEART  13*   [id number]  98*       O2 Device: High flow nasal cannula  O2 Flow Rate (L/min): 50 L/min    DIET DYSPHAGIA I PUREED; Dysphagia I Pureed;  Nectar Thick  Dietary Nutrition Supplements: Frozen Oral Supplement  Dietary Nutrition Supplements: Snack (see comment)     MEDICATIONS during current hospitalization:    Continuous Infusions:   norepinephrine Stopped (10/27/18 0316)       Scheduled Meds:   nystatin  5 mL Oral 4x Daily    torsemide  20 mg Oral Daily    spironolactone  12.5 mg Oral Daily    digoxin  125 mcg Oral Every Other Day    pill splitter   Does not apply Once    amiodarone  200 mg Oral Daily    potassium chloride  40 mEq Oral Daily    vancomycin (VANCOCIN) intermittent dosing (placeholder)   Other RX Placeholder    vancomycin  1,250 mg Intravenous Q12H    piperacillin-tazobactam  3.375 g Intravenous Q8H    aspirin  81 mg Oral Daily    ipratropium-albuterol  1 ampule Inhalation 4x daily    budesonide  0.5 mg Nebulization BID    carbidopa-levodopa  1 tablet Oral BID    famotidine  20 mg Oral BID    FLUoxetine  20 mg Oral Daily    ipratropium  2 spray Nasal BID    melatonin  3 mg Oral Nightly    sodium chloride flush  10 mL Intravenous 2 times per day    LORazepam  1 mg Intravenous Once    atorvastatin  40 mg Oral Nightly       PRN Meds:metoprolol, LORazepam, albuterol sulfate HFA, oxyCODONE-acetaminophen, LORazepam, barium sulfate, scans at this facility use dose modulation, iterative reconstruction, and/or weight based dosing when appropriate to reduce radiation dose to as low as reasonably achievable. Ct Head Wo Contrast    Result Date: 10/23/2018  CT HEAD WO CONTRAST CLINICAL HISTORY: Cerebellar infarct, follow-up COMPARISON: October 22, 2018 TECHNIQUE: Multiple unenhanced serial axial images of the brain from the vertex of the skull to the base of the skull were performed. FINDINGS: The ventricles are dilated. This is compensatory to the surrounding moderate generalized parenchymal volume loss. No mass. No midline shift. The cisterns are patent. Again note is made of mass effect on the left side of the ambient cistern. Unchanged There are white matter and periventricular changes most likely consistent with chronic small vessel disease. Old left frontal area of infarct encephalomalacia. There is diffuse decreased attenuation with increasing areas of attenuation seen in the left inferior cerebellar cortex consistent with increasing hemorrhagic conversion as compared to the prior study. This is best appreciated on the sagittal reconstructions. No acute  extra-axial findings. The visualized osseous structures are unremarkable. The visualized portion of the paranasal sinuses, and mastoids are unremarkable. Surgical staples overlying the right occipital region. 1. INTERVAL EVOLUTION, PROGRESSION OF A LEFT INFERIOR CEREBELLAR HEMISPHERE INFARCT WITH BOTH VASOGENIC EDEMA AND INCREASING HEMORRHAGIC CONVERSION. All CT scans at this facility use dose modulation, iterative reconstruction, and/or weight based dosing when appropriate to reduce radiation dose to as low as reasonably achievable. Ct Head Wo Contrast    Result Date: 10/22/2018  CT Brain Contrast medium:  Not utilized. History:  Confusion. See Door in ER after being discharged. Comparison:  CT brain obtained earlier same day.  Findings: Extra-axial spaces:  Normal. Intracranial hemorrhage:  None. Ventricular system: Ventricles mildly enlarged. Sulci mildly prominent. Basal Cisterns:  Normal. Cerebral Parenchyma: Geographic area of decreased attenuation exerting no mass effect, left frontal lobe, and right cerebellum. Midline Shift:  None. Cerebellum: Interval development of punctate areas of increased attenuation within ill-defined area of low attenuation left cerebellum (series 602, image 27). Paranasal sinuses and mastoid air cells:  Normal. Visualized Orbits:  Normal.     Impression: Interval development of petechial hemorrhage, within area of acute/subacute left cerebellar infarct. 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 Mild cerebral atrophy. All CT scans at this facility use dose modulation, iterative reconstruction, and/or weight based dosing when appropriate to reduce radiation dose to as low as reasonably achievable. Ct Head Wo Contrast    Result Date: 10/22/2018  CT HEAD WO CONTRAST, CT CERVICAL SPINE WO CONTRAST: 10/22/2018 CLINICAL HISTORY:  fall/ scalp laceration . COMPARISON: Head CT 8/11/2017 and neck CT 1/21/2017. TECHNIQUE: ROUTINE All CT scans at this facility use dose modulation, iterative reconstruction, and/or weight based dosing when appropriate to reduce radiation dose to as low as reasonably achievable. HEAD CT FINDINGS: A small area of scalp soft tissue swelling is noted posteriorly on the right. A moderate to large sized geographic hypodensity consistent with a recent ischemic infarct is noted within the inferior aspect of the left cerebellar hemisphere. Small old infarcts within the left frontal lobe and right cerebellar hemisphere, and mild generalized age-related atrophic changes appear unchanged. There is no intracranial hemorrhage, significant midline shift, extra-axial collection, hydrocephalus, other recent other recent ischemic infarct or skull fracture identified.   CERVICAL SPINE CT FINDINGS: The spine is visualized from the Date: 10/21/2018  EXAM: CT of the abdomen and pelvis with contrast History: Postprandial upper abdominal pain Technique: Multiple contiguous axial images were obtained of the abdomen and pelvis from an level of the lung bases through the initial tuberosities with IV contrast. Multiplanar reformats were obtained. Comparison: CT abdomen and pelvis from July 1, 2016 Findings: Small bilateral pleural effusions with patchy right middle lobe, right lower lobe, and left lower lobe groundglass opacities. Heart size is enlarged. Pacemaker leads noted. No significant pericardial effusion. A few calcified gallstones are noted. Gallbladder is otherwise within normal limits. The liver, pancreas, and stomach appear within normal limits. Unchanged appearance of the bilateral adrenal glands. A few small ossifications are identified within the spleen, most compatible with sequela of remote granulomatous disease. IVC filter is noted. Again identified are multiple bilateral renal cysts that do not appear significantly changed from prior examination, the largest on the right measuring 4.7 cm and the largest on the left measuring 3.5 cm. Kidneys enhance uniformly. No urinary tract calculi or hydronephrosis. Urinary bladder is well distended. Prostate is mildly enlarged and demonstrates a few internal calcifications. Small left inguinal hernia. Abdominal aorta is nonaneurysmal  and demonstrates mild atherosclerotic calcification . Celiac axis, superior mesenteric artery, and inferior mesenteric artery are patent. No retroperitoneal or abdominal/pelvic lymphadenopathy. No small bowel obstruction. A few colonic diverticuli are identified. No overt colonic mass or pericolonic inflammation. Appendix is within normal limits. No free fluid or free air. Degenerative changes are present of the spine and both hips. No acute intra-abdominal process.  Small bilateral pleural effusions and scattered groundglass opacities in the lung bases other following:                                                                RIGHT            LEFT Carotid plaque burden                         moderate               Mild Proximal common carotid artery           58 cm/s            118 cm/s  Mid common carotid artery                   74 cm/s            103 cm/s  Distal common carotid artery                60 cm/s           85 cm/s Proximal internal carotid artery             62 cm/s            59 cm/s Mid internal carotid artery                      62 cm/s           49 cm/s Distal internal carotid artery                  69 cm/s             59 cm/s External carotid artery                            100 cm/s           115 cm/s    ICA/CCA ratio                                         0.94                0.57   Vertebral arteries antegrade flow         Yes                     Yes     1. RIGHT INTERNAL CAROTID ARTERY: <50% STENOSIS. 2. LEFT INTERNAL CAROTID ARTERY: <50% STENOSIS. 3. MILD TO MODERATE CAROTID PLAQUE BURDEN IN THE CAROTID BIFURCATIONS IN THE NECK. 4. BILATERALLY PATENT VERTEBRAL ARTERIES WITH ANTEGRADE BLOOD FLOW. Validated velocity measurements with angiographic measurements, velocity criteria are extrapolated from diameter data as defined by the Society of Radiologist in [address] [place] [address] Drive Radiology 2003; [id number]. IMPRESSION AND SUGGESTION:  1. Acute on chronic hypoxemic and hypercapnic respiratory failure, patient is improving gradually remains on high flow oxygen, will obtain follow-up blood gases in the morning meanwhile weaning down FiO2 as tolerated  2. Observed dysphagia and high risk of aspiration speech therapy evaluation showed improvement in his swallowing ability and allowed by mouth diet with restrictions  3. Bilateral basilar pneumonia rule out aspiration, chest x-rays showing significant improvement in his basilar infiltrates will evaluate again tomorrow with a follow-up chest x-ray  4.  Suspect obstructive sleep

## 2018-10-29 NOTE — PROGRESS NOTES
Physician Progress Note    10/29/2018   3:16 PM    Name:  Sarah Cardoza  MRN:    [id number]     IP Day: 7     Admit Date: 10/22/2018 10:40 AM  PCP: Gabriella Castano DO    Code Status:  Full Code    Subjective:     Feels better today.      Physical Examination:      Vitals:  BP (!) 98/48   Pulse 76   Temp 99.3 °F (37.4 °C) (Oral)   Resp 25   Ht 6' 1\" (1.854 m)   Wt 223 lb 1.7 oz (101.2 kg)   SpO2 96%   BMI 29.44 kg/m²   Temp (24hrs), Av.2 °F (37.3 °C), Min:98.1 °F (36.7 °C), Max:100.2 °F (37.9 °C)      General appearance: alert, cooperative and no distress  Mental Status: oriented to person, place and time and normal affect  Lungs: clear bilaterally, normal effort  Heart: regular rate and rhythm, no murmur  Abdomen: soft, nontender, nondistended, bowel sounds present, no masses  Extremities: 1+ pitting edema, redness, tenderness in the calves  Skin: no gross lesions, rashes    Data:     Labs:  Recent Labs      10/28/18   0444  10/28/18   0513  10/29/18   0449   WBC  6.0   --   6.7   HGB  11.5*  10.7*  11.4*   PLT  75*   --   88*     Recent Labs      10/28/18   0444  10/28/18   0513  10/29/18   0449   NA  144   --   143   K  3.5   --   3.8   CL  100   --   101   CO2  33*   --   32*   BUN  31*   --   31*   CREATININE  0.65*  0.9  0.77   GLUCOSE  95   --   96     Recent Labs      10/28/18   0444  10/29/18   0449   AST  35  34   ALT  <5  8   BILITOT  4.1*  4.0*   ALKPHOS  56  73       Current Facility-Administered Medications   Medication Dose Route Frequency Provider Last Rate Last Dose    nystatin (MYCOSTATIN) 296404 UNIT/ML suspension 500,000 Units  5 mL Oral 4x Daily Marie Roberts DO        torsemide (DEMADEX) tablet 20 mg  20 mg Oral Daily Jason Sue MD   20 mg at 10/29/18 0805    spironolactone (ALDACTONE) tablet 12.5 mg  12.5 mg Oral Daily Jason Sue MD   12.5 mg at 10/29/18 2560    digoxin (LANOXIN) tablet 125 mcg  125 mcg Oral Every Other Day Jason Sue MD   125 mcg at

## 2018-10-29 NOTE — PROGRESS NOTES
Yes  Patient assessed for rehabilitation services?: Yes  Family / Caregiver Present: No  General Comment  Comments: Patient up in chair, agreeable to PT eval.    Restrictions:  Restrictions/Precautions: Fall Risk     SUBJECTIVE: Subjective: Patient drowsy throughout session requiring increased cueing and redirection. Pre Treatment Pain Screening  Intervention List: Patient able to continue with treatment    Post Treatment Pain Screening:   Pain Screening  Patient Currently in Pain: Yes  Pain Assessment  Pain Assessment: Faces  Garcias-Baker Pain Rating: Hurts a little bit  Pain Location: Back    Prior Level of Function:  Social/Functional History  Lives With: Alone  Type of Home: Apartment  Home Layout: One level  Home Access: Elevator  Bathroom Shower/Tub: Walk-in shower  ADL Assistance: Independent  Homemaking Assistance: Independent  Homemaking Responsibilities: Yes  Ambulation Assistance: Independent (no AD)  Transfer Assistance: Independent  Additional Comments: Patient independent in ADLs and mobility 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, cleaning lady 1x/month    OBJECTIVE:   Vision/Hearing:  Vision: Within Functional Limits  Hearing: Within functional limits    Cognition:  Overall Orientation Status: Within Functional Limits  Follows Commands: Impaired         ROM:       Strength:  Strength RLE  Comment: observed at least 3+/5  Strength LLE  Comment: observed at least 3+/5    Neuro:  Balance  Posture: Fair  Sitting - Static: Fair;+  Sitting - Dynamic: Fair  Standing - Static: Poor  Standing - Dynamic: Poor             Bed mobility  Sit to Supine: Moderate assistance    Transfers  Sit to Stand: Moderate Assistance  Stand to sit: Moderate Assistance  Bed to Chair: Moderate assistance    Ambulation  Ambulation?: Yes  Ambulation 1  Surface: level tile  Device: Hand-Held Assist (Bilateral HHA)  Assistance: Maximum assistance; Moderate assistance (+2)  Quality of Gait: decreased step height and length, decreased madeline, forward head, poor restricted    Goals:  Patient goals : Patient willing to participate in PT POC  Short term goals  Short term goal 1: Patient will be SBA HEP  Short term goal 2: Patient will complete bed mobility min A  Short term goal 3: Patient will sit<>stand min A  Short term goal 4: Patient will tolerate standing static balance with BUE >5 minutes with CGA  Long term goals  Long term goal 1: Patient will ambulate >50 feet with LRAD min A  Long term goal 2: Patient will tolerate >5 minutes continuous standing activity SBA    AMPAC (6 CLICK) BASIC MOBILITY  AM-PAC Inpatient Mobility Raw Score : 12     Therapy Time:   Individual   Time In 1308   Time Out 1320   Minutes 12           Priscila Franco, [facility], 10/29/18 at 1:32 PM

## 2018-10-29 NOTE — PROGRESS NOTES
11.4*   HCT  32.3*  33.8*   --   34.0*   MCV  90.1  91.2   --   90.9   MCH  30.4  31.1   --   30.6   MCHC  33.8  34.1   --   33.7   RDW  13.9  14.3   --   13.8   PLT  75*  75*   --   88*         Hepatic Function Panel:  Recent Labs      10/27/18   0447  10/28/18   0444  10/29/18   0449   ALKPHOS  45  56  73   ALT  <5  <5  8   AST  25  35  34   PROT  5.0*  5.2*  5.2*   BILITOT  4.2*  4.1*  4.0*   BILIDIR  1.0*  1.1*  1.2*   LABALBU  2.5*  2.6*  2.7*       Magnesium:  Recent Labs      10/28/18   0444   MG  2.4*       Pro-BNP:  Lab Results   Component Value Date    PROBNP 1,978 08/12/2017    PROBNP 3,822 08/11/2017    PROBNP 5,216 11/21/2016       TSH:  Lab Results   Component Value Date    TSH 2.780 07/01/2016       Lipid Profile:  Lab Results   Component Value Date    TRIG 161 07/01/2016    HDL 56 07/01/2016    LDLCALC 43 07/01/2016       HgbA1C:  Lab Results   Component Value Date    LABA1C 5.7 09/07/2013       Lactate Level:  No results for input(s): LACTA in the last 72 hours. CMP:  Recent Labs      10/27/18   0447  10/27/18   1339  10/28/18   0444  10/28/18   0513  10/29/18   0449   NA  141  140  144   --   143   K  2.9*  3.4*  3.5   --   3.8   CL  97*  98  100   --   101   CO2  32*  31*  33*   --   32*   BUN  32*  31*  31*   --   31*   CREATININE  0.82  0.70  0.65*  0.9  0.77   GLUCOSE  105  115*  95   --   96   CALCIUM  8.0*  8.0*  8.2*   --   8.3*   PROT  5.0*   --   5.2*   --   5.2*   LABALBU  2.5*   --   2.6*   --   2.7*   BILITOT  4.2*   --   4.1*   --   4.0*   ALKPHOS  45   --   56   --   73   AST  25   --   35   --   34   ALT  <5   --   <5   --   8         RADIOLOGY     XR CHEST PORTABLE   Final Result      MILDLY IMPROVED BILATERAL PULMONARY INFILTRATES COMPARED TO 10/25/2018. NASOGASTRIC TUBE IN GOOD APPARENT POSITION. NO OTHER SIGNIFICANT CHANGES IDENTIFIED. CTA CHEST W WO CONTRAST   Final Result   1. NO EVIDENCE OF CENTRAL OR SEGMENTAL PULMONARY EMBOLISM.      2.  DIFFUSE warfarin or NOAC secondary to recurrent hemoptysis       Please do not hesitate to call with questions.   Electronically signed by Paolo Cardoso MD, [facility] on 10/29/2018 at 11:00 AM

## 2018-10-29 NOTE — PLAN OF CARE
Problem: Safety  Goal: No falls during hospitalization  Patient will not fall during hospitalization. Outcome: Ongoing      Problem: Skin Integrity - Risk of, Impaired  Goal: No skin breakdown during hospitalization  Outcome: Ongoing      Problem: NEUROLOGICAL DEFICIT  Goal: Absence of continued neurological deterioration signs and symptoms  Absence of continued neurological deterioration signs and symptoms     Outcome: Ongoing      Problem: Pain:  Goal: Pain level will decrease  Pain level will decrease   Outcome: Ongoing    Goal: Control of acute pain  Control of acute pain   Outcome: Ongoing    Goal: Control of chronic pain  Control of chronic pain   Outcome: Ongoing      Problem: Mobility - Impaired:  Goal: Mobility will improve  Mobility will improve   Outcome: Ongoing      Problem: Risk for Impaired Skin Integrity  Goal: Tissue integrity - skin and mucous membranes  Structural intactness and normal physiological function of skin and  mucous membranes.    Outcome: Ongoing      Problem: Restraint Use - Nonviolent/Non-Self-Destructive Behavior:  Goal: Absence of restraint indications  Absence of restraint indications   Outcome: Ongoing    Goal: Absence of restraint-related injury  Absence of restraint-related injury   Outcome: Ongoing

## 2018-10-29 NOTE — PROGRESS NOTES
within reach [] Chair alarm activated         [x] Bed alarm activated    Plan:  [x] Continue as per plan of care  [] Prepare for Discharge   [] Discharge      Patient/Caregiver Education:  Treatment goals discussed with [x]  Patient  []  family. The [x]  Patient  []  family understand the diagnosis, prognosis and plan of care. Signature:  Samantha Jensen.  Tyrone Reinoso, Date: 10/29/2018, Time: 9:54 AM

## 2018-10-29 NOTE — PLAN OF CARE
Problem: Safety  Goal: No falls during hospitalization  Patient will not fall during hospitalization. Outcome: Met This Shift      Problem: Skin Integrity - Risk of, Impaired  Goal: No skin breakdown during hospitalization  Outcome: Met This Shift      Problem: NEUROLOGICAL DEFICIT  Goal: Absence of continued neurological deterioration signs and symptoms  Absence of continued neurological deterioration signs and symptoms       Outcome: Met This Shift      Problem: Nutrition  Goal: Optimal nutrition therapy  Nutrition Problem: Inadequate oral intake  Intervention: Food and/or Nutrient Delivery: Continue current diet, Start ONS (DYS1 with NTL. Add fortified pudding @ B, Magic Cup @ L & D. May need to re-star IVF for hydration)  Nutritional Goals: po > 50% meals and supplements, > 1000 ml NLT po     Outcome: Ongoing      Problem: IP SWALLOWING  Goal: LTG - patient will tolerate the least restrictive diet consistency to allow for safe consumption of daily meals  Outcome: Met This Shift      Problem: Mobility - Impaired:  Goal: Mobility will improve  Mobility will improve   Outcome: Ongoing      Problem: Risk for Impaired Skin Integrity  Goal: Tissue integrity - skin and mucous membranes  Structural intactness and normal physiological function of skin and  mucous membranes.    Outcome: Met This Shift      Problem: Restraint Use - Nonviolent/Non-Self-Destructive Behavior:  Goal: Absence of restraint indications  Absence of restraint indications   Outcome: Met This Shift    Goal: Absence of restraint-related injury  Absence of restraint-related injury   Outcome: Met This Shift      Problem: Confusion - Acute:  Goal: Mental status will be restored to baseline  Mental status will be restored to baseline    Outcome: Ongoing    Goal: Absence of continued neurological deterioration signs and symptoms  Absence of continued neurological deterioration signs and symptoms       Outcome: Met This Shift      Problem: Discharge

## 2018-10-29 NOTE — PROGRESS NOTES
Occupational Therapy  Facility/Department: [id number]  Interdisciplinary Communication Note      To the referring provider,     While we thank you for your referral, Prakash Kumari was seen for an evaluation and:     [] This patient is of observation status and requires a diagnosis supportive of OT intervention in order to proceed. Please re-order at your convenience if OT is warranted. [] The patient refused skilled OT intervention, denying a need. [x] The patient has had a change in status and/or is not medically stable to participate. Please re-order at your convenience if OT is warranted. [] The patient was observed as IND in the room and does not require skilled services. [] The patient was observed as DEP; skilled services would not improve the patient's functional status. Thank you,    Electronically signed by SUNI Raphael on 10/29/18 at 7:50 AM    [facility] EMERGENCY [facility] AT [place] [facility] Department.

## 2018-10-30 NOTE — FLOWSHEET NOTE
Assumed care. No changed from previous assessment. Pt is still on high flow, pt turned for comfort. Skin checked, intact, mepilex is on. Large bruise noted on the left outer thigh. No complaints at this time. Will continue to monitor. 3;30am- am care done. Complete bed change done.

## 2018-10-30 NOTE — PROGRESS NOTES
[facility]  Speech Language/Pathology  Dysphagia Therapy Note    Tiffany Gandhi  10/30/2018    Medical Dx: Stroke-like symptom [R29.90]    Time in: 1200  Time out: 1214    Pt being seen for : [x] Dysphagia exercises  [] Oral Motor Exercises             [x] Therapeutic meal monitor  [] Other:    Subjective:  Behavior: [x] Alert  [x] Cooperative  [x]  Pleasant  [] Confused  [] Agitated   [] Uncooperative  [] Distractible [] Motivated  [] Self-Limiting [] Anxious         [] Other:    Endurance:  [] Adequate for participation in SLP sessions  [x] Reduced overall              [] Lethargic  [] Other:      Objective/Assessment:     Goal 1: Pt will complete oral motor ROM and strengthening exercises with 80% accuracy, given cues as needed, in order to strengthen lingual/labial/buccal musculature to promote safety and efficiency of oral phase of swallow and decrease risk for pocketing. Not addressed  Goal 2: Pt will complete lingual exercises that promote anterior to posterior propulsion of bolus and improve tongue base retraction with 80% accuracy in order to strengthen the muscles of the swallow to decrease risk of aspiration and to increase ability to safely handle the least restrictive diet level. Not addressed  Goal 3: Pt will complete pharyngeal strengthening exercises (such as the Con[place], Effortful swallow, etc) with 80% acc in order to strengthen and establish and more effective swallow. Pt completed effortful swallows with 100% accuracy and min cues. Goal 4: pt will tolerate trials of nectar with 5/5x no overt s/s of aspiration  Pt tolerated consecutive sips of nectar thick liquid via straw without overt s/s of aspiration. JOHANN Florentino stated pt has been tolerating current diet well. Pt tolerated trials thin liquid via cup and straw without overt s/s of aspiration, even with consecutive sips of thin liquid via straw.   Pt tolerated bites of regular solids with prolonged mastication time and min oral residuals without overt s/s of aspiration. Pt produced 2-3 swallows per each bite/sips. Goal 5: pt will tolerate thermal gustatory stimulation to improve swallow initiation and will initiate a swallow within 2-3 seconds 5/5x  Not addressed    Rec: upgrade to mechanical soft (dysphagia II) with thin liquids, small bites and sips, with another upgrade possible as strength improves. Discussed with RN Minerva Gold. [] Pt demonstrated no s/s of pain during this visit. 9 /10  Sharp, left ribs  [x] Nursing notified    Safety Devices:  [x] Call light within reach [] Chair alarm activated         [x] Bed alarm activated    Plan:  [x] Continue as per plan of care  [] Prepare for Discharge   [] Discharge      Patient/Caregiver Education:  Treatment goals discussed with [x]  Patient  []  family. The [x]  Patient  []  family understand the diagnosis, prognosis and plan of care.     Signature:  Electronically signed by JESUS Cha Si on 10/30/2018 at 12:22 PM

## 2018-10-30 NOTE — PROGRESS NOTES
10/28/18   0444  10/28/18   0513  10/29/18   0449   WBC  6.0   --   6.7   HGB  11.5*  10.7*  11.4*   HCT  33.8*   --   34.0*   MCV  91.2   --   90.9   PLT  75*   --   88*     Recent Labs      10/28/18   0444  10/28/18   0513  10/29/18   0449   NA  144   --   143   K  3.5   --   3.8   CL  100   --   101   CO2  33*   --   32*   BUN  31*   --   31*   CREATININE  0.65*  0.9  0.77   GLUCOSE  95   --   96   CALCIUM  8.2*   --   8.3*   PROT  5.2*   --   5.2*   LABALBU  2.6*   --   2.7*   BILITOT  4.1*   --   4.0*   ALKPHOS  56   --   73   AST  35   --   34   ALT  <5   --   8   LABGLOM  >60.0  >60  >60.0   GFRAA  >60.0  >60  >60.0       MV Settings:     FiO2 : 59 %    Recent Labs      10/30/18   0518   PHART  7.448   [id number]  55*   PO2ART  64*   [id number]  37.8*   BEART  14*   [id number]  92*       O2 Device: High flow nasal cannula  O2 Flow Rate (L/min): 50 L/min    DIET DYSPHAGIA I PUREED; Dysphagia I Pureed;  Nectar Thick  Dietary Nutrition Supplements: Frozen Oral Supplement  Dietary Nutrition Supplements: Snack (see comment)     MEDICATIONS during current hospitalization:    Continuous Infusions:   norepinephrine Stopped (10/27/18 0316)       Scheduled Meds:   nystatin  5 mL Oral 4x Daily    torsemide  20 mg Oral Daily    spironolactone  12.5 mg Oral Daily    digoxin  125 mcg Oral Every Other Day    pill splitter   Does not apply Once    potassium chloride  40 mEq Oral Daily    vancomycin (VANCOCIN) intermittent dosing (placeholder)   Other RX Placeholder    vancomycin  1,250 mg Intravenous Q12H    piperacillin-tazobactam  3.375 g Intravenous Q8H    aspirin  81 mg Oral Daily    ipratropium-albuterol  1 ampule Inhalation 4x daily    budesonide  0.5 mg Nebulization BID    carbidopa-levodopa  1 tablet Oral BID    famotidine  20 mg Oral BID    FLUoxetine  20 mg Oral Daily    ipratropium  2 spray Nasal BID    melatonin  3 mg Oral Nightly    sodium chloride flush  10 mL Intravenous 2 times per day    LORazepam Not utilized. History:  Confusion. Daniela Anthony in ER after being discharged. Comparison:  CT brain obtained earlier same day. Findings: Extra-axial spaces:  Normal. Intracranial hemorrhage:  None. Ventricular system: Ventricles mildly enlarged. Sulci mildly prominent. Basal Cisterns:  Normal. Cerebral Parenchyma: Geographic area of decreased attenuation exerting no mass effect, left frontal lobe, and right cerebellum. Midline Shift:  None. Cerebellum: Interval development of punctate areas of increased attenuation within ill-defined area of low attenuation left cerebellum (series 602, image 27). Paranasal sinuses and mastoid air cells:  Normal. Visualized Orbits:  Normal.     Impression: Interval development of petechial hemorrhage, within area of acute/subacute left cerebellar infarct. 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 Mild cerebral atrophy. All CT scans at this facility use dose modulation, iterative reconstruction, and/or weight based dosing when appropriate to reduce radiation dose to as low as reasonably achievable. Ct Head Wo Contrast    Result Date: 10/22/2018  CT HEAD WO CONTRAST, CT CERVICAL SPINE WO CONTRAST: 10/22/2018 CLINICAL HISTORY:  fall/ scalp laceration . COMPARISON: Head CT 8/11/2017 and neck CT 1/21/2017. TECHNIQUE: ROUTINE All CT scans at this facility use dose modulation, iterative reconstruction, and/or weight based dosing when appropriate to reduce radiation dose to as low as reasonably achievable. HEAD CT FINDINGS: A small area of scalp soft tissue swelling is noted posteriorly on the right. A moderate to large sized geographic hypodensity consistent with a recent ischemic infarct is noted within the inferior aspect of the left cerebellar hemisphere. Small old infarcts within the left frontal lobe and right cerebellar hemisphere, and mild generalized age-related atrophic changes appear unchanged.  There is no intracranial hemorrhage, significant midline shift, extra-axial significant pleural effusion, pneumothorax, or other changes identified. MILDLY IMPROVED BILATERAL PULMONARY INFILTRATES COMPARED TO 10/25/2018. NASOGASTRIC TUBE IN GOOD APPARENT POSITION. NO OTHER SIGNIFICANT CHANGES IDENTIFIED. Xr Chest Portable    Result Date: 10/25/2018  EXAMINATION: CHEST PORTABLE VIEW  CLINICAL HISTORY: Hypoxia COMPARISONS: October 22, 2018  FINDINGS: Single  views of the chest is submitted. There is a left-sided ICD device. Leads overlying the cardiac silhouette. Unchanged. There are multiple median sternotomy wires. The cardiac silhouette is enlarged unchanged configuration. .  The mediastinum is unremarkable. Pulmonary vascular remains congested with increased interstitial markings. . Right sided trachea. Bibasilar infiltrates consolidation with bilateral pleural effusions. .  No Pneumothoraces. PULMONARY VASCULAR REMAINS CONGESTED WITH INCREASED INTERSTITIAL MARKINGS SUGGESTING COMPONENT OF CHF. .. WITH SUPERIMPOSED BIBASILAR INFILTRATES CONSOLIDATION WITH BILATERAL PLEURAL EFFUSIONS    Xr Chest Portable    Result Date: 10/22/2018  EXAMINATION: XR CHEST PORTABLE CLINICAL HISTORY: FALL. HEAD INJURY. COMPARISONS: APRIL 19, 2018, AUGUST 15, 2017 FINDINGS: Median sternotomy. Pacemaker generator and wires unchanged. Cardiopericardial silhouette enlarged. Ill-defined areas of increased opacity at lung bases. Blunting costophrenic angles. STABLE CARDIOMEGALY. BIBASILAR SUBSEGMENTAL ATELECTASIS/PNEUMONIA. SMALL EFFUSIONS. Fl Modified Barium Swallow W Video    Result Date: 10/24/2018  EXAMINATION: MODIFIED BARIUM SWALLOW: CLINICAL DATA: DYSPHAGIA. COMPARISON: November 14, 2014. TECHNIQUE: A total of 7 dynamic image series over the course of 2.2 minutes were obtained.  In cooperation with Speech Pathology, a modified barium swallowing using various consistencies of both solids and liquids with dynamic imaging was performed. FINDINGS: Patient's oral stage was characterized by mild oral dysphagia. There is prolonged mastication. There is decreased AP transit. There is premature pharyngeal spillage to the level of the vallecula. Patient's pharyngeal stage was characterized by mild pharyngeal dysphagia. Swallowing reflex was triggered at the level of the vallecula. There is reduced tongue base retraction with minimal residuals in the vallecula. No penetration or aspiration noted. MILD ORAL AND PHARYNGEAL DYSPHAGIA. RECOMMENDATION BY SPEECH PATHOLOGY TO FOLLOW. Us Carotid Artery Bilateral    Result Date: 10/23/2018  BILATERAL DUPLEX CAROTID ULTRASOUND CLINICAL INFORMATION:  STROKE, ALTERED MENTAL STATUS, CONFUSION, RECENT FALL COMPARISON:  NONE AVAILABLE FINDINGS:  The bilateral carotid duplex ultrasound study demonstrates the following:                                                                RIGHT            LEFT Carotid plaque burden                         moderate               Mild Proximal common carotid artery           58 cm/s            118 cm/s  Mid common carotid artery                   74 cm/s            103 cm/s  Distal common carotid artery                60 cm/s           85 cm/s Proximal internal carotid artery             62 cm/s            59 cm/s Mid internal carotid artery                      62 cm/s           49 cm/s Distal internal carotid artery                  69 cm/s             59 cm/s External carotid artery                            100 cm/s           115 cm/s    ICA/CCA ratio                                         0.94                0.57   Vertebral arteries antegrade flow         Yes                     Yes     1. RIGHT INTERNAL CAROTID ARTERY: <50% STENOSIS. 2. LEFT INTERNAL CAROTID ARTERY: <50% STENOSIS. 3. MILD TO MODERATE CAROTID PLAQUE BURDEN IN THE CAROTID BIFURCATIONS IN THE NECK. 4. BILATERALLY PATENT VERTEBRAL ARTERIES WITH ANTEGRADE BLOOD FLOW.  Validated

## 2018-10-30 NOTE — PROGRESS NOTES
Neurology Follow up    SUBJECTIVE:  Some SOB, patient on nasal BiPAP. Patient is improving. Confused off and on No Seizure-Like Activity  Respiratory problems being addressed    PHYSICAL EXAM:    BP (!) 96/52   Pulse 81   Temp 101.3 °F (38.5 °C) (Oral)   Resp 30   Ht 6' 1\" (1.854 m)   Wt 223 lb 1.7 oz (101.2 kg)   SpO2 94%   BMI 29.44 kg/m²     General Appearance:      Mental Status Exam:             Level of Alertness:   awake            Orientation:   person,           Attention/Concentration:  normal            Language:  normal      Funduscopic Exam:     Cranial Nerves            Cranial nerve III           Pupils:  equal, round, reactive to light      Cranial nerves III, IV, VI           Extraocular Movements: intact.  No nystagmus      Cranial nerve V           Facial sensation:  intact      Cranial nerve VII           Facial strength: intact      Cranial nerve VIII           Hearing:  intact        Motor:    Drift:  absent  Motor exam is symmetrical 4+ out of 5 all extremities bilaterally  Tone:  normal  Abnormal Movements:  Absent/ dysmetria left            Sensory:        Pinprick             Right Upper Extremity:  normal             Left Upper Extremity:  normal             Right Lower Extremity:  normal             Left Lower Extremity:  normal           Vibration                         Touch            Proprioception                 Coordination:           Finger/Nose   Right:  normal              Left:  normal          Heel-Knee-Shin                Right:  normal              Left:  normal          Rapid Alternating Movements              Right:  normal              Left:  normal          Gait:                       Casual:  Gait to be tested                       Romberg:  Reflexes:             Deep Tendon Reflexes:             Reflexes are 2 +             Plantar response:                Right:  downgoing               Left:  downgoing    Vascular:  Cardiac Exam:  normal         Xr Ribs Left

## 2018-10-30 NOTE — PROGRESS NOTES
and/or weight based dosing when appropriate to reduce radiation dose to as low as reasonably achievable. HEAD CT FINDINGS: A small area of scalp soft tissue swelling is noted posteriorly on the right. A moderate to large sized geographic hypodensity consistent with a recent ischemic infarct is noted within the inferior aspect of the left cerebellar hemisphere. Small old infarcts within the left frontal lobe and right cerebellar hemisphere, and mild generalized age-related atrophic changes appear unchanged. There is no intracranial hemorrhage, significant midline shift, extra-axial collection, hydrocephalus, other recent other recent ischemic infarct or skull fracture identified. CERVICAL SPINE CT FINDINGS: The spine is visualized from the craniovertebral junction through the T1-2 level. Mild to moderate degenerative endplate and hypertrophic facet changes appear unchanged from the previous neck CT, with minimal degenerative retrolisthesis of C3 over C4, and C5 over C6. There is no fracture, other significant subluxation, or acute paraspinous soft tissue abnormalities identified. FINAL IMPRESSION: MODERATE-SIZED RECENT-APPEARING LEFT CEREBELLAR HEMISPHERE ISCHEMIC INFARCT. NO INTRACRANIAL HEMORRHAGE OR EVIDENCE OF SIGNIFICANT CERVICAL SPINE INJURY. MILD AGE-RELATED ATROPHIC CHANGES AND SMALL OLD LEFT FRONTAL LOBE AND RIGHT CEREBELLAR HEMISPHERE INFARCTS. MILD CERVICAL SPONDYLOSIS. Ct Cervical Spine Wo Contrast    Result Date: 10/22/2018  CT HEAD WO CONTRAST, CT CERVICAL SPINE WO CONTRAST: 10/22/2018 CLINICAL HISTORY:  fall/ scalp laceration . COMPARISON: Head CT 8/11/2017 and neck CT 1/21/2017. TECHNIQUE: ROUTINE All CT scans at this facility use dose modulation, iterative reconstruction, and/or weight based dosing when appropriate to reduce radiation dose to as low as reasonably achievable. HEAD CT FINDINGS: A small area of scalp soft tissue swelling is noted posteriorly on the right.  A moderate to large using various consistencies of both solids and liquids with dynamic imaging was performed. FINDINGS: Patient's oral stage was characterized by mild oral dysphagia. There is prolonged mastication. There is decreased AP transit. There is premature pharyngeal spillage to the level of the vallecula. Patient's pharyngeal stage was characterized by mild pharyngeal dysphagia. Swallowing reflex was triggered at the level of the vallecula. There is reduced tongue base retraction with minimal residuals in the vallecula. No penetration or aspiration noted. MILD ORAL AND PHARYNGEAL DYSPHAGIA. RECOMMENDATION BY SPEECH PATHOLOGY TO FOLLOW. Us Carotid Artery Bilateral    Result Date: 10/23/2018  BILATERAL DUPLEX CAROTID ULTRASOUND CLINICAL INFORMATION:  STROKE, ALTERED MENTAL STATUS, CONFUSION, RECENT FALL COMPARISON:  NONE AVAILABLE FINDINGS:  The bilateral carotid duplex ultrasound study demonstrates the following:                                                                RIGHT            LEFT Carotid plaque burden                         moderate               Mild Proximal common carotid artery           58 cm/s            118 cm/s  Mid common carotid artery                   74 cm/s            103 cm/s  Distal common carotid artery                60 cm/s           85 cm/s Proximal internal carotid artery             62 cm/s            59 cm/s Mid internal carotid artery                      62 cm/s           49 cm/s Distal internal carotid artery                  69 cm/s             59 cm/s External carotid artery                            100 cm/s           115 cm/s    ICA/CCA ratio                                         0.94                0.57   Vertebral arteries antegrade flow         Yes                     Yes     1. RIGHT INTERNAL CAROTID ARTERY: <50% STENOSIS. 2. LEFT INTERNAL CAROTID ARTERY: <50% STENOSIS. 3. MILD TO MODERATE CAROTID PLAQUE BURDEN IN THE CAROTID BIFURCATIONS IN THE NECK. 4.  BILATERALLY PATENT VERTEBRAL ARTERIES WITH ANTEGRADE BLOOD FLOW. Validated velocity measurements with angiographic measurements, velocity criteria are extrapolated from diameter data as defined by the Society of Radiologist in [address] [place] [address] Drive Radiology 2003; 447;160-493.        Recent Labs      10/21/18   1610  10/22/18   1345  10/23/18   0450   WBC  4.0*  10.0  9.1   HGB  13.6*  13.6*  12.9*   PLT  110*  124*  96*     Recent Labs      10/21/18   1610  10/22/18   1345  10/23/18   0450   NA  141  141  141   K  4.1  4.4  3.9   CL  99  99  101   CO2  31*  27  25   BUN  19  23  30*   CREATININE  1.02  1.10  0.88   GLUCOSE  113*  115*  124*     Recent Labs      10/21/18   1610  10/22/18   1345  10/23/18   0450   BILITOT  1.3*  2.5*  2.7*   ALKPHOS  74  70  57   AST  21  26  29   ALT  13  14  13     Lab Results   Component Value Date    PROTIME 12.5 10/22/2018    INR 1.2 10/22/2018     No results found for: LITHIUM, DILFRTOT, VALPROATE    ASSESSMENT AND PLAN    Left cerebellar infarct , mass effect  Petechial bleed, Patient is a improving slowly  resp dysfunction  CT sporal , no PE  Repeat CT seen  4th ventricle very open,   Gait ataxia prominent  Left dysmetria  PD tremors, gross/ sinemet  Keep off antiplatelets 2 days  CTA  ni large surgical issues  Antiplatelets started  No lovenox, low platelets  Rehab Working with [facility]

## 2018-10-30 NOTE — PROGRESS NOTES
ventricular size is severely increased . Severe hypokinesis of the lateral segment with severe impairment of LV   systolic function. Generalized hypokinesis of all segment with severe impairment of LV   systolic function. Restrictive filling pattern. Mild to moderate (1-2+) mitral regurgitation is present. No severe mitral stenosis but lateral leaflet looks calcified and fixed   Aortic valve leaflets are severely thickened. Peak velocity across the aortic valve is 2 m/sec. Peak gradient is 17 mm   Hg. Mean gradient is 10 mm Hg. Calculated aortic valve area is 1.6   cm2.---at least mild aortic stenosis   There is mild to moderate ( 1-2 +) tricuspid regurgitation with estimated   RVSP of 44 mm Hg.--mild pulmonary hypertension   Moderately dilated left atrium. Mildly enlarged right ventricle cavity. Right ventricle global systolic function is mildly reduced . AICD Wire visualized in right ventricle. Aortic root is somewhat fibrocalcified. Dilated aortic root. --moderate   The IVC is dilated .    C/W the report of the lv function may be reduced---MR and TR about the   same   Signature   ----------------------------------------------------------------   Electronically signed by Ange Jordan(Interpreting   physician) on 10/23/2018 04:53 PM      LAB DATA   BMP:  Recent Labs      10/27/18   1339  10/28/18   0444  10/28/18   0513  10/29/18   0449   NA  140  144   --   143   K  3.4*  3.5   --   3.8   CL  98  100   --   101   CO2  31*  33*   --   32*   BUN  31*  31*   --   31*   CREATININE  0.70  0.65*  0.9  0.77   LABGLOM  >60.0  >60.0  >60  >60.0       CBC:  Recent Labs      10/28/18   0444  10/28/18   0513  10/29/18   0449   WBC  6.0   --   6.7   RBC  3.70*   --   3.74*   HGB  11.5*  10.7*  11.4*   HCT  33.8*   --   34.0*   MCV  91.2   --   90.9   MCH  31.1   --   30.6   MCHC  34.1   --   33.7   RDW  14.3   --   13.8   PLT  75*   --   88*       Cardiac Enzymes:   No results for input(s): CKTOTAL,

## 2018-10-31 NOTE — PROGRESS NOTES
Neurology Follow up    SUBJECTIVE:  Some SOB, patient on high oxygen. Patient off the nasal BiPAP  Patient is improving. Confused off and on No Seizure-Like Activity  Respiratory problems being addressed  Anemia being watched    PHYSICAL EXAM:    BP (!) 94/47   Pulse 74   Temp 97.5 °F (36.4 °C) (Axillary)   Resp 18   Ht 6' 1\" (1.854 m)   Wt 218 lb 4.1 oz (99 kg)   SpO2 100%   BMI 28.80 kg/m²     General Appearance:      Mental Status Exam:             Level of Alertness:   awake            Orientation:   person,           Attention/Concentration:  normal            Language:  normal      Funduscopic Exam:     Cranial Nerves            Cranial nerve III           Pupils:  equal, round, reactive to light      Cranial nerves III, IV, VI           Extraocular Movements: intact.  No nystagmus      Cranial nerve V           Facial sensation:  intact      Cranial nerve VII           Facial strength: intact      Cranial nerve VIII           Hearing:  intact        Motor:    Drift:  absent  Motor exam is symmetrical 4+ out of 5 all extremities bilaterally  Tone:  normal  Abnormal Movements:  Absent/ dysmetria left            Sensory:        Pinprick             Right Upper Extremity:  normal             Left Upper Extremity:  normal             Right Lower Extremity:  normal             Left Lower Extremity:  normal           Vibration                         Touch            Proprioception                 Coordination:           Finger/Nose   Right:  normal              Left:  normal          Heel-Knee-Shin                Right:  normal              Left:  normal          Rapid Alternating Movements              Right:  normal              Left:  normal          Gait:                       Casual:  Gait to be tested                       Romberg:  Reflexes:             Deep Tendon Reflexes:             Reflexes are 2 +             Plantar response:                Right:  downgoing               Left: downgoing    Vascular:  Cardiac Exam:  normal         Xr Ribs Left Include Chest (min 3 Views)    Result Date: 10/22/2018  EXAMINATION: Left RIBS and AP chest. 6 films CLINICAL HISTORY: Trauma. Pain FINDINGS: AP and oblique views of the ribs on the left side and a frontal view of the chest were obtained. BB marker overlies the lower lateral left hemithorax at the site of an acute slightly angled left 10th rib fracture, apex laterally. In addition there are displaced fractures through the posterior aspect of the left seventh, eighth, and ninth ribs. No underlying pneumothorax. No significant parenchymal contusion or pleural effusion appreciated. Cardiomegaly with pacemaker wires in place. Valvular prosthesis overlies the enlarged heart. Mild central vascular congestion bilaterally noted. MULTIPLE LEFT RIB FRACTURES DESCRIBED AND FELT TO BE ACUTE. NO UNDERLYING PNEUMOTHORAX OR SIGNIFICANT CONTUSION OR HEMOPNEUMOTHORAX. CARDIOMEGALY AND VASCULAR CONGESTION. Ct Head Wo Contrast    Result Date: 10/23/2018  CT HEAD WO CONTRAST CLINICAL HISTORY: Cerebellar infarct, follow-up COMPARISON: October 22, 2018 TECHNIQUE: Multiple unenhanced serial axial images of the brain from the vertex of the skull to the base of the skull were performed. FINDINGS: The ventricles are dilated. This is compensatory to the surrounding moderate generalized parenchymal volume loss. No mass. No midline shift. The cisterns are patent. Again note is made of mass effect on the left side of the ambient cistern. Unchanged There are white matter and periventricular changes most likely consistent with chronic small vessel disease. Old left frontal area of infarct encephalomalacia. There is diffuse decreased attenuation with increasing areas of attenuation seen in the left inferior cerebellar cortex consistent with increasing hemorrhagic conversion as compared to the prior study. This is best appreciated on the sagittal reconstructions.  No acute extra-axial findings. The visualized osseous structures are unremarkable. The visualized portion of the paranasal sinuses, and mastoids are unremarkable. Surgical staples overlying the right occipital region. 1. INTERVAL EVOLUTION, PROGRESSION OF A LEFT INFERIOR CEREBELLAR HEMISPHERE INFARCT WITH BOTH VASOGENIC EDEMA AND INCREASING HEMORRHAGIC CONVERSION. All CT scans at this facility use dose modulation, iterative reconstruction, and/or weight based dosing when appropriate to reduce radiation dose to as low as reasonably achievable. Ct Head Wo Contrast    Result Date: 10/22/2018  CT Brain Contrast medium:  Not utilized. History:  Confusion. Bennett Mary in ER after being discharged. Comparison:  CT brain obtained earlier same day. Findings: Extra-axial spaces:  Normal. Intracranial hemorrhage:  None. Ventricular system: Ventricles mildly enlarged. Sulci mildly prominent. Basal Cisterns:  Normal. Cerebral Parenchyma: Geographic area of decreased attenuation exerting no mass effect, left frontal lobe, and right cerebellum. Midline Shift:  None. Cerebellum: Interval development of punctate areas of increased attenuation within ill-defined area of low attenuation left cerebellum (series 602, image 27). Paranasal sinuses and mastoid air cells:  Normal. Visualized Orbits:  Normal.     Impression: Interval development of petechial hemorrhage, within area of acute/subacute left cerebellar infarct. 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 Mild cerebral atrophy. All CT scans at this facility use dose modulation, iterative reconstruction, and/or weight based dosing when appropriate to reduce radiation dose to as low as reasonably achievable. Ct Head Wo Contrast    Result Date: 10/22/2018  CT HEAD WO CONTRAST, CT CERVICAL SPINE WO CONTRAST: 10/22/2018 CLINICAL HISTORY:  fall/ scalp laceration . COMPARISON: Head CT 8/11/2017 and neck CT 1/21/2017.  TECHNIQUE: ROUTINE All CT scans at this facility use dose Speech Pathology, a modified barium swallowing using various consistencies of both solids and liquids with dynamic imaging was performed. FINDINGS: Patient's oral stage was characterized by mild oral dysphagia. There is prolonged mastication. There is decreased AP transit. There is premature pharyngeal spillage to the level of the vallecula. Patient's pharyngeal stage was characterized by mild pharyngeal dysphagia. Swallowing reflex was triggered at the level of the vallecula. There is reduced tongue base retraction with minimal residuals in the vallecula. No penetration or aspiration noted. MILD ORAL AND PHARYNGEAL DYSPHAGIA. RECOMMENDATION BY SPEECH PATHOLOGY TO FOLLOW. Us Carotid Artery Bilateral    Result Date: 10/23/2018  BILATERAL DUPLEX CAROTID ULTRASOUND CLINICAL INFORMATION:  STROKE, ALTERED MENTAL STATUS, CONFUSION, RECENT FALL COMPARISON:  NONE AVAILABLE FINDINGS:  The bilateral carotid duplex ultrasound study demonstrates the following:                                                                RIGHT            LEFT Carotid plaque burden                         moderate               Mild Proximal common carotid artery           58 cm/s            118 cm/s  Mid common carotid artery                   74 cm/s            103 cm/s  Distal common carotid artery                60 cm/s           85 cm/s Proximal internal carotid artery             62 cm/s            59 cm/s Mid internal carotid artery                      62 cm/s           49 cm/s Distal internal carotid artery                  69 cm/s             59 cm/s External carotid artery                            100 cm/s           115 cm/s    ICA/CCA ratio                                         0.94                0.57   Vertebral arteries antegrade flow         Yes                     Yes     1. RIGHT INTERNAL CAROTID ARTERY: <50% STENOSIS. 2. LEFT INTERNAL CAROTID ARTERY: <50% STENOSIS.  3. MILD TO MODERATE CAROTID PLAQUE BURDEN IN THE

## 2018-10-31 NOTE — PROGRESS NOTES
Physician Progress Note    10/31/2018   3:42 PM    Name:  Yolanda Alvares  MRN:    [id number]     IP Day: 9     Admit Date: 10/22/2018 10:40 AM  PCP: Boaz Tomlinson DO    Code Status:  Full Code    Subjective:     Feels better today.      Physical Examination:      Vitals:  BP (!) 94/47   Pulse 74   Temp 97.5 °F (36.4 °C) (Axillary)   Resp 18   Ht 6' 1\" (1.854 m)   Wt 218 lb 4.1 oz (99 kg)   SpO2 100%   BMI 28.80 kg/m²   Temp (24hrs), Av.2 °F (36.8 °C), Min:97.5 °F (36.4 °C), Max:98.7 °F (37.1 °C)      General appearance: alert, cooperative and no distress  Mental Status: oriented to person, place and time and normal affect  Lungs: clear bilaterally, normal effort  Heart: regular rate and rhythm, no murmur  Abdomen: soft, nontender, nondistended, bowel sounds present, no masses  Extremities: 1+ pitting edema, redness, tenderness in the calves  Skin: no gross lesions, rashes    Data:     Labs:  Recent Labs      10/29/18   0449  10/31/18   0450   WBC  6.7  6.5   HGB  11.4*  10.1*   PLT  88*  80*     Recent Labs      10/29/18   0449  10/31/18   0450   NA  143  143   K  3.8  3.9   CL  101  101   CO2  32*  35*   BUN  31*  26*   CREATININE  0.77  0.84   GLUCOSE  96  105     Recent Labs      10/29/18   0449  10/31/18   0450   AST  34  31   ALT  8  7   BILITOT  4.0*  3.8*   ALKPHOS  73  79       Current Facility-Administered Medications   Medication Dose Route Frequency Provider Last Rate Last Dose    ipratropium-albuterol (DUONEB) nebulizer solution 1 ampule  1 ampule Inhalation TID Gaby Sorenson MD        acetaminophen (TYLENOL) tablet 650 mg  650 mg Oral Q6H PRN Gaby Sorenson MD   650 mg at 10/30/18 1528    0.9 % sodium chloride infusion   Intravenous Continuous Ari Garcia  mL/hr at 10/30/18 2043      nystatin (MYCOSTATIN) 329294 UNIT/ML suspension 500,000 Units  5 mL Oral 4x Daily Nevin Beck, DO   500,000 Units at 10/31/18 0859    spironolactone (ALDACTONE) tablet 12.5 mg  12.5 mg Oral Daily

## 2018-10-31 NOTE — PROGRESS NOTES
CRITICAL CARE PROGRESS NOTES    PATIENT NAME: Analia Corado  MRN: [id number]  SERVICE DATE:  October 31, 2018   SERVICE TIME:  10:59 AM      PRIMARY SERVICE: Critical care medicine    CHIEF COMPLAINTS: Cough    INTERVAL HPI: Patient seen and examined at bedside, Interval Notes, orders reviewed. Discussed on multidisciplinary rounds  Patient continues to improve today. He had mild decrease in his blood pressure overnight but improved with volume replacement has not required pressors in 2 days. He is more alert awake responds appropriately to questions and commands. Was up in a chair 3 hours yesterday. Was bearing weight without much support. His oxygenation remains very adequate on current high flow supplement. We'll attempt switch him to face mask today. He has excellent cough effort, expectorating some sputum, otherwise he generally remains stable. Platelets seem to decrease slowly other blood work is stable      OBJECTIVE    Body mass index is 28.8 kg/m². PHYSICAL EXAM:  Vitals:  /62   Pulse 81   Temp 98.7 °F (37.1 °C) (Axillary)   Resp 21   Ht 6' 1\" (1.854 m)   Wt 218 lb 4.1 oz (99 kg)   SpO2 97%   BMI 28.80 kg/m²   General: Patient is  Alert, awake . comfortable in bed, No distress. Head: Atraumatic , Normocephalic   Eyes: PERRL. No icteric sclera. No conjunctival injection. No discharge   ENT: No nasal  discharge. Pharynx clear. Neck:  Trachea midline. No thyromegaly, no JVD, No cervical adenopathy. Chest : Adequate spontaneous effort, symmetric bilateral excursions  Lung : Decreased breath sounds bilaterally, occasional rhonchi cleared with cough  Heart[de-identified] Regular rhythm and rate. No mumur ,  Rub or gallop  ABD: Benign. Non-tender. Non-distended. No masses or organmegaly. Normal bowel sounds. EXT: No significant Pitting edema both lower extremities , No Cyanosis No clubbing  Neuro: no focal weakness  Skin: Warm and dry. No erythema or rash on exposed extremities.       DATA: Recent Labs      10/29/18   0449  10/31/18   0450   WBC  6.7  6.5   HGB  11.4*  10.1*   HCT  34.0*  29.4*   MCV  90.9  89.8   PLT  88*  80*     Recent Labs      10/29/18   0449  10/31/18   0450   NA  143  143   K  3.8  3.9   CL  101  101   CO2  32*  35*   BUN  31*  26*   CREATININE  0.77  0.84   GLUCOSE  96  105   CALCIUM  8.3*  8.6   PROT  5.2*  5.4*   LABALBU  2.7*  3.3*   BILITOT  4.0*  3.8*   ALKPHOS  73  79   AST  34  31   ALT  8  7   LABGLOM  >60.0  >60.0   GFRAA  >60.0  >60.0   GLOB   --   2.1*       MV Settings:     FiO2 : 50 %    Recent Labs      10/30/18   0518   PHART  7.448   [id number]  55*   PO2ART  64*   [id number]  37.8*   BEART  14*   [id number]  92*       O2 Device: Venturi mask  O2 Flow Rate (L/min): 12 L/min    Dietary Nutrition Supplements: Frozen Oral Supplement  Dietary Nutrition Supplements: Snack (see comment)  DIET GENERAL; Dysphagia II Mechanically Altered     MEDICATIONS during current hospitalization:    Continuous Infusions:   sodium chloride 500 mL/hr at 10/30/18 2043       Scheduled Meds:   nystatin  5 mL Oral 4x Daily    torsemide  20 mg Oral Daily    spironolactone  12.5 mg Oral Daily    digoxin  125 mcg Oral Every Other Day    pill splitter   Does not apply Once    potassium chloride  40 mEq Oral Daily    vancomycin (VANCOCIN) intermittent dosing (placeholder)   Other RX Placeholder    vancomycin  1,250 mg Intravenous Q12H    piperacillin-tazobactam  3.375 g Intravenous Q8H    aspirin  81 mg Oral Daily    ipratropium-albuterol  1 ampule Inhalation 4x daily    budesonide  0.5 mg Nebulization BID    carbidopa-levodopa  1 tablet Oral BID    famotidine  20 mg Oral BID    FLUoxetine  20 mg Oral Daily    ipratropium  2 spray Nasal BID    melatonin  3 mg Oral Nightly    sodium chloride flush  10 mL Intravenous 2 times per day    LORazepam  1 mg Intravenous Once    atorvastatin  40 mg Oral Nightly       PRN Meds:acetaminophen, metoprolol, LORazepam, albuterol sulfate HFA, day. Findings: Extra-axial spaces:  Normal. Intracranial hemorrhage:  None. Ventricular system: Ventricles mildly enlarged. Sulci mildly prominent. Basal Cisterns:  Normal. Cerebral Parenchyma: Geographic area of decreased attenuation exerting no mass effect, left frontal lobe, and right cerebellum. Midline Shift:  None. Cerebellum: Interval development of punctate areas of increased attenuation within ill-defined area of low attenuation left cerebellum (series 602, image 27). Paranasal sinuses and mastoid air cells:  Normal. Visualized Orbits:  Normal.     Impression: Interval development of petechial hemorrhage, within area of acute/subacute left cerebellar infarct. 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 Mild cerebral atrophy. All CT scans at this facility use dose modulation, iterative reconstruction, and/or weight based dosing when appropriate to reduce radiation dose to as low as reasonably achievable. Ct Head Wo Contrast    Result Date: 10/22/2018  CT HEAD WO CONTRAST, CT CERVICAL SPINE WO CONTRAST: 10/22/2018 CLINICAL HISTORY:  fall/ scalp laceration . COMPARISON: Head CT 8/11/2017 and neck CT 1/21/2017. TECHNIQUE: ROUTINE All CT scans at this facility use dose modulation, iterative reconstruction, and/or weight based dosing when appropriate to reduce radiation dose to as low as reasonably achievable. HEAD CT FINDINGS: A small area of scalp soft tissue swelling is noted posteriorly on the right. A moderate to large sized geographic hypodensity consistent with a recent ischemic infarct is noted within the inferior aspect of the left cerebellar hemisphere. Small old infarcts within the left frontal lobe and right cerebellar hemisphere, and mild generalized age-related atrophic changes appear unchanged. There is no intracranial hemorrhage, significant midline shift, extra-axial collection, hydrocephalus, other recent other recent ischemic infarct or skull fracture identified.   CERVICAL SPINE History: Postprandial upper abdominal pain Technique: Multiple contiguous axial images were obtained of the abdomen and pelvis from an level of the lung bases through the initial tuberosities with IV contrast. Multiplanar reformats were obtained. Comparison: CT abdomen and pelvis from July 1, 2016 Findings: Small bilateral pleural effusions with patchy right middle lobe, right lower lobe, and left lower lobe groundglass opacities. Heart size is enlarged. Pacemaker leads noted. No significant pericardial effusion. A few calcified gallstones are noted. Gallbladder is otherwise within normal limits. The liver, pancreas, and stomach appear within normal limits. Unchanged appearance of the bilateral adrenal glands. A few small ossifications are identified within the spleen, most compatible with sequela of remote granulomatous disease. IVC filter is noted. Again identified are multiple bilateral renal cysts that do not appear significantly changed from prior examination, the largest on the right measuring 4.7 cm and the largest on the left measuring 3.5 cm. Kidneys enhance uniformly. No urinary tract calculi or hydronephrosis. Urinary bladder is well distended. Prostate is mildly enlarged and demonstrates a few internal calcifications. Small left inguinal hernia. Abdominal aorta is nonaneurysmal  and demonstrates mild atherosclerotic calcification . Celiac axis, superior mesenteric artery, and inferior mesenteric artery are patent. No retroperitoneal or abdominal/pelvic lymphadenopathy. No small bowel obstruction. A few colonic diverticuli are identified. No overt colonic mass or pericolonic inflammation. Appendix is within normal limits. No free fluid or free air. Degenerative changes are present of the spine and both hips. No acute intra-abdominal process. Small bilateral pleural effusions and scattered groundglass opacities in the lung bases other relating to infiltrate or atelectasis.  Colonic diverticulosis

## 2018-10-31 NOTE — PROGRESS NOTES
LEFT INCLUDE CHEST (MIN 3 VIEWS)   Final Result   MULTIPLE LEFT RIB FRACTURES DESCRIBED AND FELT TO BE ACUTE. NO UNDERLYING PNEUMOTHORAX OR SIGNIFICANT CONTUSION OR HEMOPNEUMOTHORAX. CARDIOMEGALY AND VASCULAR CONGESTION. CT Head WO Contrast   Final Result   Impression:      Interval development of petechial hemorrhage, within area of acute/subacute left cerebellar infarct. 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 Mild cerebral atrophy. All CT scans at this facility use dose modulation, iterative reconstruction, and/or weight based dosing when appropriate to reduce radiation dose to as low as reasonably achievable. XR CHEST PORTABLE   Final Result   STABLE CARDIOMEGALY. BIBASILAR SUBSEGMENTAL ATELECTASIS/PNEUMONIA. SMALL EFFUSIONS. CT Head WO Contrast   Final Result   FINAL IMPRESSION:      MODERATE-SIZED RECENT-APPEARING LEFT CEREBELLAR HEMISPHERE ISCHEMIC INFARCT. NO INTRACRANIAL HEMORRHAGE OR EVIDENCE OF SIGNIFICANT CERVICAL SPINE INJURY. MILD AGE-RELATED ATROPHIC CHANGES AND SMALL OLD LEFT FRONTAL LOBE AND RIGHT CEREBELLAR HEMISPHERE INFARCTS. MILD CERVICAL SPONDYLOSIS. CT CERVICAL SPINE WO CONTRAST   Final Result   FINAL IMPRESSION:      MODERATE-SIZED RECENT-APPEARING LEFT CEREBELLAR HEMISPHERE ISCHEMIC INFARCT. NO INTRACRANIAL HEMORRHAGE OR EVIDENCE OF SIGNIFICANT CERVICAL SPINE INJURY. MILD AGE-RELATED ATROPHIC CHANGES AND SMALL OLD LEFT FRONTAL LOBE AND RIGHT CEREBELLAR HEMISPHERE INFARCTS. MILD CERVICAL SPONDYLOSIS.                       CURRENT MEDICATIONS       nystatin  5 mL Oral 4x Daily    torsemide  20 mg Oral Daily    spironolactone  12.5 mg Oral Daily    digoxin  125 mcg Oral Every Other Day    pill splitter   Does not apply Once    potassium chloride  40 mEq Oral Daily    vancomycin (VANCOCIN) intermittent dosing (placeholder)   Other RX Placeholder    vancomycin  1,250 mg Intravenous Q12H  piperacillin-tazobactam  3.375 g Intravenous Q8H    aspirin  81 mg Oral Daily    ipratropium-albuterol  1 ampule Inhalation 4x daily    budesonide  0.5 mg Nebulization BID    carbidopa-levodopa  1 tablet Oral BID    famotidine  20 mg Oral BID    FLUoxetine  20 mg Oral Daily    ipratropium  2 spray Nasal BID    melatonin  3 mg Oral Nightly    sodium chloride flush  10 mL Intravenous 2 times per day    LORazepam  1 mg Intravenous Once    atorvastatin  40 mg Oral Nightly       INTRAVENOUS MEDICATIONS       sodium chloride 500 mL/hr at 10/30/18 2043       ASSESSMENT     1) Acute cerebellar stroke with ataxia and accidental fall. Petechial hemorrhage noted on CT. 2) Hypotension. Improved. 3) Acute hypoxic respiratory failure secondary to acute on chronic systolic congestive heart failure. 4) Dysphagia. Probable aspiration. 5) Permanent atrial fibrillation. 6) Bilateral basilar infiltrates. 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 7) Parkinson's disease with prominent tremors     8) Chronic amiodarone therapy for ventricular arrhythmias. Had previous ICD shocks for VT. 9) Underlying nonischemic cardiomyopathy with severe LV dysfunction. 10) Status post aortic valve replacement with bioprosthetic valve. 11) Thrombocytopenia    12) Hypokalemia      PLAN     Agree with discontinuing amiodarone  Supplement K+  Cautious hydration/Hold diuretics  Continue high flow oxygen  Continue digoxin.    Continue antibiotics  Platelet therapy resumed  Not able to use warfarin or NOAC secondary to recurrent hemoptysis  OK to transfer to telemetry       Electronically signed by Marcus Lanza MD, [facility] on 10/31/2018 at 10:33 AM

## 2018-10-31 NOTE — PROGRESS NOTES
Pt is refusing to wear the bipap at night and wants to stay on the 50% VM, his Spo2 is 97%. bipap was pulled from the room.

## 2018-11-01 NOTE — PROGRESS NOTES
12.5 mg Oral Daily Michael Watts MD   25 mg at 11/01/18 0850    digoxin (LANOXIN) tablet 125 mcg  125 mcg Oral Every Other Day Michael Watts MD   125 mcg at 11/01/18 7774    pill splitter   Does not apply Once Michael Watts MD        potassium chloride 20 MEQ/15ML (10%) oral solution 40 mEq  40 mEq Oral Daily Ronette Marcela, DO   40 mEq at 11/01/18 3538    vancomycin (VANCOCIN) intermittent dosing (placeholder)   Other RX Placeholder Sumi Perea MD        metoprolol (LOPRESSOR) injection 2.5 mg  2.5 mg Intravenous Q4H PRN Michael Watts MD        vancomycin (VANCOCIN) 1,250 mg in dextrose 5 % 250 mL IVPB  1,250 mg Intravenous Q12H Nawafette Marcela,  mL/hr at 11/01/18 0849 1,250 mg at 11/01/18 0849    piperacillin-tazobactam (ZOSYN) 3.375 g in dextrose 5 % 50 mL IVPB (mini-bag)  3.375 g Intravenous Q8H Marie Roberts, DO 12.5 mL/hr at 11/01/18 0600 3.375 g at 11/01/18 0600    aspirin chewable tablet 81 mg  81 mg Oral Daily Pradip Gamble MD   81 mg at 11/01/18 0850    LORazepam (ATIVAN) injection 0.5 mg  0.5 mg Intravenous Q8H PRN Nawafette Marcela, DO   0.5 mg at 10/29/18 0048    albuterol sulfate  (90 Base) MCG/ACT inhaler 2 puff  2 puff Inhalation Q2H PRN Sarah Beth Roberts, DO        budesonide (PULMICORT) nebulizer suspension 500 mcg  0.5 mg Nebulization BID Jones uMnoz MD   500 mcg at 11/01/18 0710    oxyCODONE-acetaminophen (PERCOCET) 5-325 MG per tablet 1 tablet  1 tablet Oral Q4H PRN Nawafette Marcela, DO   1 tablet at 10/24/18 2153    carbidopa-levodopa (SINEMET)  MG per tablet 1 tablet  1 tablet Oral BID Ronette Marcela, DO   1 tablet at 11/01/18 0851    famotidine (PEPCID) tablet 20 mg  20 mg Oral BID Ronette Marcela, DO   20 mg at 11/01/18 0850    FLUoxetine (PROZAC) capsule 20 mg  20 mg Oral Daily Ronette Marcela, DO   20 mg at 11/01/18 0850    ipratropium (ATROVENT) 0.03 % nasal spray 2 spray  2 spray Nasal BID Ronette Marcela, DO   2 spray at

## 2018-11-01 NOTE — PROGRESS NOTES
radiation dose to as low as reasonably achievable. CT HEAD WO CONTRAST   Final Result   Impression:      No interval change, left hemorrhagic cerebellar infarct. Remote right cerebellar infarct. All CT scans at this facility use dose modulation, iterative reconstruction, and/or weight based dosing when appropriate to reduce radiation dose to as low as reasonably achievable. XR CHEST PORTABLE   Final Result   PULMONARY VASCULAR REMAINS CONGESTED WITH INCREASED INTERSTITIAL MARKINGS SUGGESTING COMPONENT OF CHF. .. WITH SUPERIMPOSED BIBASILAR INFILTRATES CONSOLIDATION WITH BILATERAL PLEURAL EFFUSIONS      FL MODIFIED BARIUM SWALLOW W VIDEO   Final Result      MILD ORAL AND PHARYNGEAL DYSPHAGIA. RECOMMENDATION BY SPEECH PATHOLOGY TO FOLLOW. CT HEAD WO CONTRAST   Final Result      1. INTERVAL EVOLUTION, PROGRESSION OF A LEFT INFERIOR CEREBELLAR HEMISPHERE INFARCT WITH BOTH VASOGENIC EDEMA AND INCREASING HEMORRHAGIC CONVERSION. All CT scans at this facility use dose modulation, iterative reconstruction, and/or weight based dosing when appropriate to reduce radiation dose to as low as reasonably achievable. US CAROTID ARTERY BILATERAL   Final Result   1. RIGHT INTERNAL CAROTID ARTERY: <50% STENOSIS. 2. LEFT INTERNAL CAROTID ARTERY: <50% STENOSIS. 3. MILD TO MODERATE CAROTID PLAQUE BURDEN IN THE CAROTID BIFURCATIONS IN THE NECK. 4. BILATERALLY PATENT VERTEBRAL ARTERIES WITH ANTEGRADE BLOOD FLOW. Validated velocity measurements with angiographic measurements, velocity criteria are extrapolated from diameter data as defined by the Society of Radiologist in [address] [place] [address] Radiology 2003; 933;[id number]. XR RIBS LEFT INCLUDE CHEST (MIN 3 VIEWS)   Final Result   MULTIPLE LEFT RIB FRACTURES DESCRIBED AND FELT TO BE ACUTE. NO UNDERLYING PNEUMOTHORAX OR SIGNIFICANT CONTUSION OR HEMOPNEUMOTHORAX. CARDIOMEGALY AND VASCULAR CONGESTION.

## 2018-11-01 NOTE — PROGRESS NOTES
reasonably achievable. Cta Chest W Wo Contrast    Result Date: 10/25/2018  The EXAMINATION: CT scan of the chest with contrast (pulmonary embolism protocol) INDICATION: Chest pain and shortness of breath. COMPARISON: August 10, 9555 TECHNIQUE: Helical CT was performed through the chest utilizing 100 cc of Isovue-370 intravenous contrast.  Images were obtained with bolus tracking in order to opacify the pulmonary arteries. There is no comparison available. Both MIP and 3D volume rendered reconstructions were performed. FINDINGS: There are no findings of central or segmental pulmonary emboli. Visualized lung parenchyma is within normal limits and central airways appear patent. There are patchy multifocal to closing areas of infiltrates throughout the lung parenchyma as well as more consolidative areas within the bases and air bronchograms. No pleural effusions. No pneumothoraces. There is pretracheal, parahilar and subcarinal adenopathy. Visualized osseous structures show multilevel degenerative change with bridging osteophytes superimposed upon a moderate dorsal kyphosis. 1.  NO EVIDENCE OF CENTRAL OR SEGMENTAL PULMONARY EMBOLISM. 2.  DIFFUSE PATCHY MULTIFOCAL TO CONFLUENT AREAS OF AIRSPACE DISEASE THROUGHOUT THE LUNG PARENCHYMA AS DESCRIBED ABOVE All CT scans at this facility use dose modulation, iterative reconstruction, and/or weight based dosing when appropriate to reduce radiation dose to as low as reasonably achievable. Ct Cervical Spine Wo Contrast    Result Date: 10/22/2018  CT HEAD WO CONTRAST, CT CERVICAL SPINE WO CONTRAST: 10/22/2018 CLINICAL HISTORY:  fall/ scalp laceration . COMPARISON: Head CT 8/11/2017 and neck CT 1/21/2017. TECHNIQUE: ROUTINE All CT scans at this facility use dose modulation, iterative reconstruction, and/or weight based dosing when appropriate to reduce radiation dose to as low as reasonably achievable.  HEAD CT FINDINGS: A small area of scalp soft tissue swelling is noted posteriorly on the right. A moderate to large sized geographic hypodensity consistent with a recent ischemic infarct is noted within the inferior aspect of the left cerebellar hemisphere. Small old infarcts within the left frontal lobe and right cerebellar hemisphere, and mild generalized age-related atrophic changes appear unchanged. There is no intracranial hemorrhage, significant midline shift, extra-axial collection, hydrocephalus, other recent other recent ischemic infarct or skull fracture identified. CERVICAL SPINE CT FINDINGS: The spine is visualized from the craniovertebral junction through the T1-2 level. Mild to moderate degenerative endplate and hypertrophic facet changes appear unchanged from the previous neck CT, with minimal degenerative retrolisthesis of C3 over C4, and C5 over C6. There is no fracture, other significant subluxation, or acute paraspinous soft tissue abnormalities identified. FINAL IMPRESSION: MODERATE-SIZED RECENT-APPEARING LEFT CEREBELLAR HEMISPHERE ISCHEMIC INFARCT. NO INTRACRANIAL HEMORRHAGE OR EVIDENCE OF SIGNIFICANT CERVICAL SPINE INJURY. MILD AGE-RELATED ATROPHIC CHANGES AND SMALL OLD LEFT FRONTAL LOBE AND RIGHT CEREBELLAR HEMISPHERE INFARCTS. MILD CERVICAL SPONDYLOSIS. Ct Abdomen Pelvis W Iv Contrast Additional Contrast? None    Result Date: 10/21/2018  EXAM: CT of the abdomen and pelvis with contrast History: Postprandial upper abdominal pain Technique: Multiple contiguous axial images were obtained of the abdomen and pelvis from an level of the lung bases through the initial tuberosities with IV contrast. Multiplanar reformats were obtained. Comparison: CT abdomen and pelvis from July 1, 2016 Findings: Small bilateral pleural effusions with patchy right middle lobe, right lower lobe, and left lower lobe groundglass opacities. Heart size is enlarged. Pacemaker leads noted. No significant pericardial effusion. A few calcified gallstones are noted.  Gallbladder is aspiration precaution. Chest x-ray reviewed shows consolidations and patchy interstitial and groundglass opacity to rule out amiodarone toxicity. Amiodarone or cold. With obesity possible KELVIN. At this time. Present treatment plan with O2 to keep saturation 92% above continue nebulizer with DuoNeb's and Pulmicort.   Will follow     Electronically signed by Tammy Otoole MD, FCCP on 11/1/2018 at 10:46 AM

## 2018-11-01 NOTE — CARE COORDINATION
Pt from ICU - discharge plan is [employer]. Belkis Davis, continuing to follow and note left for the physician regarding if the  bipap will be needed at discharge. [id number] sent.  Electronically signed by SHANEKA Conde on 11/1/18 at 4:44 PM

## 2018-11-01 NOTE — PLAN OF CARE
Problem: Safety  Goal: No falls during hospitalization  Patient will not fall during hospitalization. Outcome: Ongoing      Problem: NEUROLOGICAL DEFICIT  Goal: Absence of continued neurological deterioration signs and symptoms  Absence of continued neurological deterioration signs and symptoms       Outcome: Met This Shift      Problem: Nutrition  Goal: Optimal nutrition therapy  Nutrition Problem: Inadequate oral intake  Intervention: Food and/or Nutrient Delivery: Continue current diet, Start ONS (DYS1 with NTL. Add fortified pudding @ B, Magic Cup @ L & D. May need to re-star IVF for hydration)  Nutritional Goals: po > 50% meals and supplements, > 1000 ml NLT po     Outcome: Met This Shift      Problem: Pain:  Goal: Pain level will decrease  Pain level will decrease   Outcome: Met This Shift    Goal: Control of acute pain  Control of acute pain   Outcome: Met This Shift    Goal: Control of chronic pain  Control of chronic pain   Outcome: Met This Shift      Problem: Mobility - Impaired:  Goal: Mobility will improve  Mobility will improve   Outcome: Met This Shift      Problem: Risk for Impaired Skin Integrity  Goal: Tissue integrity - skin and mucous membranes  Structural intactness and normal physiological function of skin and  mucous membranes.    Outcome: Met This Shift      Problem: Confusion - Acute:  Goal: Mental status will be restored to baseline  Mental status will be restored to baseline    Outcome: Ongoing    Goal: Absence of continued neurological deterioration signs and symptoms  Absence of continued neurological deterioration signs and symptoms       Outcome: Met This Shift      Problem: Discharge Planning:  Goal: Ability to perform activities of daily living will improve  Ability to perform activities of daily living will improve    Outcome: Ongoing    Goal: Participates in care planning  Participates in care planning    Outcome: Met This Shift      Problem: Mood - Altered:  Goal: Mood

## 2018-11-01 NOTE — PROGRESS NOTES
[facility]  Speech Language/Pathology  Dysphagia Therapy Note    Vee Cardoso  11/1/2018    Medical Dx: Stroke-like symptom [R29.90]    Time in: 7241  Time out: 18    Pt being seen for : [x] Dysphagia exercises  [x] Oral Motor Exercises             [x] Therapeutic meal monitor  [] Other:    Subjective: Pt reported he didn't eat much for breakfast as he did not like what was brought to him. Behavior: [x] Alert  [x] Cooperative  [x]  Pleasant  [] Confused  [] Agitated   [] Uncooperative  [] Distractible [] Motivated  [] Self-Limiting [] Anxious         [] Other:    Endurance:  [x] Adequate for participation in SLP sessions  [] Reduced overall              [] Lethargic  [] Other:      Objective/Assessment:     Goal 1: Pt will complete oral motor ROM and strengthening exercises with 80% accuracy, given cues as needed, in order to strengthen lingual/labial/buccal musculature to promote safety and efficiency of oral phase of swallow and decrease risk for pocketing. Pt completed OM tasks 2 sets, 5 repetitions given max cues for completion. Goal 2: Pt will complete lingual exercises that promote anterior to posterior propulsion of bolus and improve tongue base retraction with 80% accuracy in order to strengthen the muscles of the swallow to decrease risk of aspiration and to increase ability to safely handle the least restrictive diet level. Pt completed lingual tasks 1 set, 5 repetitions given max cues from SLP. Goal 3: Pt will complete pharyngeal strengthening exercises (such as the Constance, Effortful swallow, etc) with 80% acc in order to strengthen and establish and more effective swallow. Pt with 0% accuracy for completing the Constance maneuver x3 despite max cues and modeling from SLP. Pt completed an Effortful swallow x3 given min cues. Goal 4: pt will tolerate trials of nectar with 5/5x no overt s/s of aspiration  Pt upgraded to thin liquids 10/3/2018.    Pt consumed thin

## 2018-11-02 NOTE — DISCHARGE SUMMARY
tablet by mouth every 12 hours for 10 days             aspirin (ECOTRIN LOW STRENGTH) 81 MG EC tablet  Take 81 tablets by mouth daily             atorvastatin (LIPITOR) 40 MG tablet  Take 1 tablet by mouth nightly             carbidopa-levodopa (SINEMET)  MG per tablet  Take 1 tablet by mouth 2 times daily             DIGOXIN PO  Take 0.125 mcg by mouth Twice a Week every other day             diphenhydrAMINE (BENADRYL) 25 MG tablet  Take 25 mg by mouth every evening             famotidine (PEPCID) 20 MG tablet  Take 1 tablet by mouth 2 times daily             FLUoxetine (PROZAC) 40 MG capsule  Take 20 mg by mouth daily              furosemide (LASIX) 40 MG tablet  Take 40 mg by mouth daily             ipratropium (ATROVENT) 0.03 % nasal spray  2 sprays by Nasal route 2 times daily             ipratropium-albuterol (DUONEB) 0.5-2.5 (3) MG/3ML SOLN nebulizer solution  Inhale 3 mLs into the lungs every 6 hours as needed for Shortness of Breath             lisinopril (ZESTRIL) 5 MG tablet  Take 2.5 mg by mouth daily              LORazepam (ATIVAN) 0.5 MG tablet  Take 1 tablet by mouth every 6 hours as needed for Anxiety for up to 3 days. .             magnesium oxide (MAG-OX) 400 MG tablet  Take 400 mg by mouth daily             melatonin 3 MG TABS tablet  Take 3 mg by mouth nightly             spironolactone (ALDACTONE) 25 MG tablet  Take 12.5 mg by mouth daily             trifluoperazine (STELAZINE) 2 MG tablet  Take 2 mg by mouth nightly                 Disposition:   If discharged to Home, Any [place] [id number] needs that were indicated and/or required as been addressed and set up by Social Work. Condition at discharge: Pt was medically stable at the time of discharge. Activity: activity as tolerated    Total time taken for discharging this patient: 40 minutes. Greater than 70% of time was spent focused exclusively on this patient.  Time was taken to review chart, discuss plans with consultants, reconciling medications, discussing plan answering questions with patient.      Nathaniel Gill  11/2/2018, 10:24 AM  ----------------------------------------------------------------------------------------------------------------------    Noah Burns

## 2018-11-02 NOTE — PROGRESS NOTES
atrial-paced complexes and with occasional premature ventricular complexes  Biventricular pacemaker detected  Abnormal ECG     Telemetry: paced. Echocardiogram:   Left ventricular ejection fraction is visually estimated at 20%. Left ventricular size is severely increased . Severe hypokinesis of the lateral segment with severe impairment of LV   systolic function. Generalized hypokinesis of all segment with severe impairment of LV   systolic function. Restrictive filling pattern. Mild to moderate (1-2+) mitral regurgitation is present. No severe mitral stenosis but lateral leaflet looks calcified and fixed   Aortic valve leaflets are severely thickened. Peak velocity across the aortic valve is 2 m/sec. Peak gradient is 17 mm   Hg. Mean gradient is 10 mm Hg. Calculated aortic valve area is 1.6   cm2.---at least mild aortic stenosis   There is mild to moderate ( 1-2 +) tricuspid regurgitation with estimated   RVSP of 44 mm Hg.--mild pulmonary hypertension   Moderately dilated left atrium. Mildly enlarged right ventricle cavity. Right ventricle global systolic function is mildly reduced . AICD Wire visualized in right ventricle. Aortic root is somewhat fibrocalcified. Dilated aortic root. --moderate   The IVC is dilated .    C/W the report of the lv function may be reduced---MR and TR about the   same   Signature   ----------------------------------------------------------------   Electronically signed by Christian Jordan(Interpreting   physician) on 10/23/2018 04:53 PM      LAB DATA   BMP:  Recent Labs      10/31/18   0450   NA  143   K  3.9   CL  101   CO2  35*   BUN  26*   CREATININE  0.84   LABGLOM  >60.0       CBC:  Recent Labs      10/31/18   0450   WBC  6.5   RBC  3.27*   HGB  10.1*   HCT  29.4*   MCV  89.8   MCH  30.8   MCHC  34.4   RDW  13.7   PLT  80*       Hepatic Function Panel:  Recent Labs      10/31/18   0450   ALKPHOS  79   ALT  7   AST  31   PROT  5.4*   BILITOT  3.8* LABALBU  3.3*       Magnesium:  Recent Labs      10/31/18   0450   MG  2.3       Pro-BNP:  Lab Results   Component Value Date    PROBNP 1,978 08/12/2017    PROBNP 3,822 08/11/2017    PROBNP 5,216 11/21/2016       TSH:  Lab Results   Component Value Date    TSH 2.780 07/01/2016       Lipid Profile:  Lab Results   Component Value Date    TRIG 161 07/01/2016    HDL 56 07/01/2016    LDLCALC 43 07/01/2016       HgbA1C:  Lab Results   Component Value Date    LABA1C 5.7 09/07/2013       CMP:  Recent Labs      10/31/18   0450   NA  143   K  3.9   CL  101   CO2  35*   BUN  26*   CREATININE  0.84   GLUCOSE  105   CALCIUM  8.6   PROT  5.4*   LABALBU  3.3*   BILITOT  3.8*   ALKPHOS  79   AST  31   ALT  7         RADIOLOGY     CT CHEST WO CONTRAST   Final Result      Subsegmental and nodular consolidation, interstitial prominence, and groundglass opacities as described. Findings are either unchanged, or progressed as described. Findings compatible with, but not pathognomonic of, amiodarone toxicity. Median sternotomy, with cardiomegaly. All CT scans at this facility use dose modulation, iterative reconstruction, and/or weight based dosing when appropriate to reduce radiation dose to as low as reasonably achievable. XR CHEST PORTABLE   Final Result      APPARENTLY WORSENING AIRSPACE AND INTERSTITIAL INFILTRATES, WITH POSSIBILITIES AS DESCRIBED. XR CHEST PORTABLE   Final Result      MILDLY IMPROVED BILATERAL PULMONARY INFILTRATES COMPARED TO 10/25/2018. NASOGASTRIC TUBE IN GOOD APPARENT POSITION. NO OTHER SIGNIFICANT CHANGES IDENTIFIED. CTA CHEST W WO CONTRAST   Final Result   1. NO EVIDENCE OF CENTRAL OR SEGMENTAL PULMONARY EMBOLISM.      2.  DIFFUSE PATCHY MULTIFOCAL TO CONFLUENT AREAS OF AIRSPACE DISEASE THROUGHOUT THE LUNG PARENCHYMA AS DESCRIBED ABOVE      All CT scans at this facility use dose modulation, iterative reconstruction, and/or weight based dosing when flush  10 mL Intravenous 2 times per day    LORazepam  1 mg Intravenous Once    atorvastatin  40 mg Oral Nightly       INTRAVENOUS MEDICATIONS       sodium chloride 100 mL/hr (11/02/18 0604)       ASSESSMENT     1) Acute cerebellar stroke with ataxia and accidental fall. Petechial hemorrhage noted on CT. 2) Hypotension. Improved. 3) Acute hypoxic respiratory failure secondary to acute on chronic systolic congestive heart failure. 4) Dysphagia. Probable aspiration. 5) Permanent atrial fibrillation. 6) Bilateral basilar infiltrates. 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 7) Parkinson's disease with prominent tremors     8) Chronic amiodarone therapy for ventricular arrhythmias. Had previous ICD shocks for VT. 9) Underlying nonischemic cardiomyopathy with severe LV dysfunction. 10) Status post aortic valve replacement with bioprosthetic valve.      11) Thrombocytopenia    12) Hypokalemia      PLAN     Amiodarone discontinued  Potassium was supplemented  Diuretics currently on hold  Monitor fluid status closely  Wean O2  Continue digoxin   Continue antibiotics  Platelet therapy resumed  Not able to use warfarin or NOAC secondary to recurrent hemoptysis  Increase activity       Electronically signed by Anastacio Maguire MD, [facility] on 11/2/2018 at 10:53 AM

## 2018-11-02 NOTE — PROGRESS NOTES
extra-axial findings. The visualized osseous structures are unremarkable. The visualized portion of the paranasal sinuses, and mastoids are unremarkable. Surgical staples overlying the right occipital region. 1. INTERVAL EVOLUTION, PROGRESSION OF A LEFT INFERIOR CEREBELLAR HEMISPHERE INFARCT WITH BOTH VASOGENIC EDEMA AND INCREASING HEMORRHAGIC CONVERSION. All CT scans at this facility use dose modulation, iterative reconstruction, and/or weight based dosing when appropriate to reduce radiation dose to as low as reasonably achievable. Ct Head Wo Contrast    Result Date: 10/22/2018  CT Brain Contrast medium:  Not utilized. History:  Confusion. [place] in ER after being discharged. Comparison:  CT brain obtained earlier same day. Findings: Extra-axial spaces:  Normal. Intracranial hemorrhage:  None. Ventricular system: Ventricles mildly enlarged. Sulci mildly prominent. Basal Cisterns:  Normal. Cerebral Parenchyma: Geographic area of decreased attenuation exerting no mass effect, left frontal lobe, and right cerebellum. Midline Shift:  None. Cerebellum: Interval development of punctate areas of increased attenuation within ill-defined area of low attenuation left cerebellum (series 602, image 27). Paranasal sinuses and mastoid air cells:  Normal. Visualized Orbits:  Normal.     Impression: Interval development of petechial hemorrhage, within area of acute/subacute left cerebellar infarct. 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 Mild cerebral atrophy. All CT scans at this facility use dose modulation, iterative reconstruction, and/or weight based dosing when appropriate to reduce radiation dose to as low as reasonably achievable. Ct Head Wo Contrast    Result Date: 10/22/2018  CT HEAD WO CONTRAST, CT CERVICAL SPINE WO CONTRAST: 10/22/2018 CLINICAL HISTORY:  fall/ scalp laceration . COMPARISON: Head CT 8/11/2017 and neck CT 1/21/2017.  TECHNIQUE: ROUTINE All CT scans at this facility use dose posteriorly on the right. A moderate to large sized geographic hypodensity consistent with a recent ischemic infarct is noted within the inferior aspect of the left cerebellar hemisphere. Small old infarcts within the left frontal lobe and right cerebellar hemisphere, and mild generalized age-related atrophic changes appear unchanged. There is no intracranial hemorrhage, significant midline shift, extra-axial collection, hydrocephalus, other recent other recent ischemic infarct or skull fracture identified. CERVICAL SPINE CT FINDINGS: The spine is visualized from the craniovertebral junction through the T1-2 level. Mild to moderate degenerative endplate and hypertrophic facet changes appear unchanged from the previous neck CT, with minimal degenerative retrolisthesis of C3 over C4, and C5 over C6. There is no fracture, other significant subluxation, or acute paraspinous soft tissue abnormalities identified. FINAL IMPRESSION: MODERATE-SIZED RECENT-APPEARING LEFT CEREBELLAR HEMISPHERE ISCHEMIC INFARCT. NO INTRACRANIAL HEMORRHAGE OR EVIDENCE OF SIGNIFICANT CERVICAL SPINE INJURY. MILD AGE-RELATED ATROPHIC CHANGES AND SMALL OLD LEFT FRONTAL LOBE AND RIGHT CEREBELLAR HEMISPHERE INFARCTS. MILD CERVICAL SPONDYLOSIS. Xr Chest Portable    Result Date: 10/22/2018  EXAMINATION: XR CHEST PORTABLE CLINICAL HISTORY: FALL. HEAD INJURY. COMPARISONS: APRIL 19, 2018, AUGUST 15, 2017 FINDINGS: Median sternotomy. Pacemaker generator and wires unchanged. Cardiopericardial silhouette enlarged. Ill-defined areas of increased opacity at lung bases. Blunting costophrenic angles. STABLE CARDIOMEGALY. BIBASILAR SUBSEGMENTAL ATELECTASIS/PNEUMONIA. SMALL EFFUSIONS. Fl Modified Barium Swallow W Video    Result Date: 10/23/2018  EXAMINATION: MODIFIED BARIUM SWALLOW: CLINICAL DATA: DYSPHAGIA. COMPARISON: November 14, 2014. TECHNIQUE: A total of 7 dynamic image series over the course of 2.2 minutes were obtained.  In cooperation with Speech Pathology, a modified barium swallowing using various consistencies of both solids and liquids with dynamic imaging was performed. FINDINGS: Patient's oral stage was characterized by mild oral dysphagia. There is prolonged mastication. There is decreased AP transit. There is premature pharyngeal spillage to the level of the vallecula. Patient's pharyngeal stage was characterized by mild pharyngeal dysphagia. Swallowing reflex was triggered at the level of the vallecula. There is reduced tongue base retraction with minimal residuals in the vallecula. No penetration or aspiration noted. MILD ORAL AND PHARYNGEAL DYSPHAGIA. RECOMMENDATION BY SPEECH PATHOLOGY TO FOLLOW. Us Carotid Artery Bilateral    Result Date: 10/23/2018  BILATERAL DUPLEX CAROTID ULTRASOUND CLINICAL INFORMATION:  STROKE, ALTERED MENTAL STATUS, CONFUSION, RECENT FALL COMPARISON:  NONE AVAILABLE FINDINGS:  The bilateral carotid duplex ultrasound study demonstrates the following:                                                                RIGHT            LEFT Carotid plaque burden                         moderate               Mild Proximal common carotid artery           58 cm/s            118 cm/s  Mid common carotid artery                   74 cm/s            103 cm/s  Distal common carotid artery                60 cm/s           85 cm/s Proximal internal carotid artery             62 cm/s            59 cm/s Mid internal carotid artery                      62 cm/s           49 cm/s Distal internal carotid artery                  69 cm/s             59 cm/s External carotid artery                            100 cm/s           115 cm/s    ICA/CCA ratio                                         0.94                0.57   Vertebral arteries antegrade flow         Yes                     Yes     1. RIGHT INTERNAL CAROTID ARTERY: <50% STENOSIS. 2. LEFT INTERNAL CAROTID ARTERY: <50% STENOSIS.  3. MILD TO MODERATE CAROTID PLAQUE BURDEN IN THE

## 2018-11-02 NOTE — PLAN OF CARE
Problem: Skin Integrity - Risk of, Impaired  Goal: No skin breakdown during hospitalization  Outcome: Ongoing      Problem: NEUROLOGICAL DEFICIT  Goal: Absence of continued neurological deterioration signs and symptoms  Absence of continued neurological deterioration signs and symptoms       Outcome: Ongoing      Problem: Nutrition  Goal: Optimal nutrition therapy  Outcome: Ongoing      Problem: Mobility - Impaired:  Goal: Mobility will improve  Mobility will improve   Outcome: Ongoing      Problem: Risk for Impaired Skin Integrity  Goal: Tissue integrity - skin and mucous membranes  Structural intactness and normal physiological function of skin and  mucous membranes.    Outcome: Ongoing      Problem: Confusion - Acute:  Goal: Mental status will be restored to baseline  Mental status will be restored to baseline    Outcome: Ongoing    Goal: Absence of continued neurological deterioration signs and symptoms  Absence of continued neurological deterioration signs and symptoms       Outcome: Ongoing      Problem: Discharge Planning:  Goal: Ability to perform activities of daily living will improve  Ability to perform activities of daily living will improve    Outcome: Ongoing    Goal: Participates in care planning  Participates in care planning    Outcome: Ongoing      Problem: Injury - Risk of, Physical Injury:  Goal: Absence of physical injury  Absence of physical injury    Outcome: Ongoing    Goal: Will remain free from falls  Will remain free from falls    Outcome: Ongoing      Problem: Mood - Altered:  Goal: Mood stable  Mood stable    Outcome: Ongoing    Goal: Absence of abusive behavior  Absence of abusive behavior    Outcome: Ongoing    Goal: Verbalizations of feeling emotionally comfortable while being cared for will increase  Verbalizations of feeling emotionally comfortable while being cared for will increase    Outcome: Ongoing      Problem: Psychomotor Activity - Altered:  Goal: Absence of psychomotor

## 2018-11-07 NOTE — PROGRESS NOTES
Physical Therapy  Facility/Department: [facility] MED SURG [id number]/[id number]  Physical Therapy Discharge      NAME: Pilo Colon    : 8/3/1932 (80 y.o.)  MRN: [id number]    Account: [de-identified]  Gender: male      Patient has been discharged from acute care hospital. DC patient from current PT program.      Electronically signed by Irma Rene PT on 18 at 8:10 AM

## 2018-11-21 PROBLEM — R65.21 SEPTIC SHOCK (HCC): Status: ACTIVE | Noted: 2018-01-01

## 2018-11-21 PROBLEM — A41.9 SEPTIC SHOCK (HCC): Status: ACTIVE | Noted: 2018-01-01

## 2018-11-21 NOTE — ED PROVIDER NOTES
PATCHY ALVEOLAR AND INTERSTITIAL INFILTRATES  COMPARED TO THE PREVIOUS STUDY. ED BEDSIDE ULTRASOUND:   Performed by ED Physician - none    LABS:  Labs Reviewed   COMPREHENSIVE METABOLIC PANEL - Abnormal; Notable for the following:        Result Value    Chloride 94 (*)     CO2 19 (*)     Anion Gap 28 (*)     BUN 76 (*)     CREATININE 2.39 (*)     GFR Non- 25.9 (*)     GFR  31.3 (*)     Alb 3.4 (*)     Total Bilirubin 2.9 (*)     Alkaline Phosphatase 198 (*)     AST 46 (*)     All other components within normal limits    Narrative:     CALL  [facility]  ED tel. [phone],  BUN results called to and read back by Agapito Del Toro, 11/21/2018 13:13, by  Haney Longest results called to and read back by Agapito Del Toro, 11/21/2018 13:13, by  ALEJANDRO   CBC WITH AUTO DIFFERENTIAL - Abnormal; Notable for the following:     WBC 13.3 (*)     RBC 4.37 (*)     Hemoglobin 13.4 (*)     Hematocrit 40.7 (*)     MCHC 32.9 (*)     RDW 16.7 (*)     Platelets 989 (*)     Neutrophils # 12.1 (*)     Lymphocytes # 0.7 (*)     All other components within normal limits   TROPONIN - Abnormal; Notable for the following:     Troponin 0.139 (*)     All other components within normal limits    Narrative:     CALL  [facility]  ED tel. [phone],  Trop results called to and read back by Agapito Del Toro, 11/21/2018 13:13, by  Dora Bear   APTT - Abnormal; Notable for the following:     aPTT 20.8 (*)     All other components within normal limits   PROTIME-INR - Abnormal; Notable for the following:     Protime 13.1 (*)     All other components within normal limits   CULTURE BLOOD #1   CULTURE BLOOD #2   LACTIC ACID, PLASMA   URINE RT REFLEX TO CULTURE       All other labs were within normal range or not returned as of this dictation.     EMERGENCY DEPARTMENT COURSE and DIFFERENTIAL DIAGNOSIS/MDM:   Vitals:    Vitals:    11/21/18 1208 11/21/18 1220 11/21/18 1231 11/21/18 1308   BP: (!) 71/46 84/62 112/69 (!) 103/46   Pulse: 88 86 86 84   Resp: 16 25 27 18   Temp: 97.9 °F (36.6 °C)      SpO2: 91% (!) 89% 94% 94%   Weight: 215 lb (97.5 kg)      Height: 6' 1\" (1.854 m)          Patient with evidence of septic shock. Multiple sources this time including evidence of potential osteomyelitis, bibasilar pneumonia, and potential cholecystitis or developing gallbladder disease. Patient provided with rapid fluid resuscitation. Patient provided with O2 supplementation, aerosol treatment, and aggressive IV antibiotics. We will admit for further evaluation management is appropriate. Patient responding well to fluids blood pressure is increased and O2 levels up and respiratory rate is down. [facility]    CRITICAL CARE TIME   Total Critical Care time was 45 minutes, excluding separately reportableprocedures. There was a high probability of clinicallysignificant/life threatening deterioration in the patient's condition which required my urgent intervention. 45    CONSULTS:  None    PROCEDURES:  Unless otherwise noted below, none     Procedures    FINAL IMPRESSION      1. Septicemia ([address]    2. Pneumonia due to organism    3. Acute renal failure, unspecified acute renal failure type ([address]    4. Vertebral osteomyelitis (HCC)    5. Elevated troponin    6. Gallbladder anomaly    7. Fecal impaction [facility])        DISPOSITION/PLAN   DISPOSITION Decision To Admit 11/21/2018 01:41:08 PM      PATIENT REFERRED TO:  No follow-up provider specified.     DISCHARGE MEDICATIONS:  New Prescriptions    No medications on file          (Please note that portions of this note were completed with a voice recognitionprogram.  Efforts were made to edit the dictations but occasionally words are mis-transcribed.)    Gifty Mays MD (electronically signed)  Attending Emergency Physician          Gifty Mays MD  11/21/18 25 Socrates Navarro MD  12/03/18 1045

## 2018-11-21 NOTE — PROGRESS NOTES
Pharmacy Note  Vancomycin Consult    Román Donahue is a 80 y.o. male started on Vancomycin for sepsis; consult received from Robert Davis PA-C to manage therapy. Also receiving the following antibiotics: Zosyn. 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), wet ([address]    Pure hypercholesterolemia    Essential (primary) hypertension    Age related cataract    Parkinson disease ([facility]    Hemoptysis    Pneumonia    Thrombocytopenia ([address]    Pneumonia of right lower lobe due to infectious organism ([facility]    Chronic obstructive pulmonary disease (HCC)    SOB (shortness of breath)    Dysphagia    Food impaction of esophagus    Stroke-like symptom    Atelectasis, bilateral    Hypoxia    Septic shock ([facility]       Allergies:  Patient has no known allergies. Recent Labs      11/21/18   1226   BUN  76*       Recent Labs      11/21/18   1226   CREATININE  2.39*       Recent Labs      11/21/18   1225   WBC  13.3*       No intake or output data in the 24 hours ending 11/21/18 1552    Culture Date      Source                       Results  11/21/18              blood                          pending     Ht Readings from Last 1 Encounters:   11/21/18 6' 1\" (1.854 m)        Wt Readings from Last 1 Encounters:   11/21/18 215 lb (97.5 kg)         Body mass index is 28.37 kg/m². Estimated Creatinine Clearance: 27 mL/min (A) (based on SCr of 2.39 mg/dL (H)). Goal Trough Level: 15-20 mcg/mL    Assessment/Plan:  Will initiate vancomycin 1500 mg IV every 24 hours, based on patient's current weight, renal function and infection. Patient received first dose of vancomycin 1000mg X 1 dose today at 1543 -- will give additional 500mg dose X 1 to equal a total of 1500mg for first dose. Trough to be drawn prior to THIRD total dose of vancomycin.  Timing of trough level will be determined based on culture results, renal function, and clinical response. Trough level ordered for 1530 on 11/23/18. Will continue to monitor and make necessary adjustments. Bj Ontiveros PharmD   11/21/2018 3:57 PM      Thank you for the consult. Will continue to follow.

## 2018-11-21 NOTE — ED PROVIDER NOTES
TRIFLUOPERAZINE (STELAZINE) 2 MG TABLET    Take 2 mg by mouth nightly       ALLERGIES     Patient has no known allergies. FAMILY HISTORY       Family History   Problem Relation Age of Onset    Heart Disease Mother     COPD Father     Cancer Brother         Unknown type          SOCIAL HISTORY       Social History     Social History    Marital status:      Spouse name: N/A    Number of children: N/A    Years of education: N/A     Social History Main Topics    Smoking status: Former Smoker     Packs/day: 2.00     Years: 22.00     Quit date: 1983    Smokeless tobacco: Never Used    Alcohol use No    Drug use: No    Sexual activity: Not Asked     Other Topics Concern    None     Social History Narrative    None       SCREENINGS    Marilyn Coma Scale  Eye Opening: Spontaneous  Best Verbal Response: Oriented  Best Motor Response: Obeys commands  [place] Coma Scale Score: 15 @FLOW(30300019)@      PHYSICAL EXAM    (up to 7 for level 4, 8 or more for level 5)     ED Triage Vitals [11/20/18 2142]   BP Temp Temp Source Pulse Resp SpO2 Height Weight   106/74 98.3 °F (36.8 °C) Oral 88 20 95 % 6' 1\" (1.854 m) 215 lb (97.5 kg)       Physical Exam   Constitutional: He is oriented to person, place, and time. He appears well-developed and well-nourished. He is active. No distress. HENT:   Head: Normocephalic and atraumatic. Mouth/Throat: Mucous membranes are normal.   Neck: Normal range of motion. Neck supple. Cardiovascular: Normal rate, regular rhythm, normal heart sounds, intact distal pulses and normal pulses. Pulmonary/Chest: Effort normal and breath sounds normal.   Abdominal: Soft. Normal appearance and bowel sounds are normal. There is no tenderness. Musculoskeletal:        Lumbar back: He exhibits tenderness and bony tenderness. Back:    Bilateral lower extremity equal strength against resistance, 2+ distal pulse, sensation intact to light touch. No motor deficits appreciated.

## 2018-11-21 NOTE — ED NOTES
Labs and first set of blood cultures sent to lab with yellow labels. Lab notified to draw second set of blood cultures and lactic acid.      Chanelle Ames RN  11/21/18 [id number]

## 2018-11-22 NOTE — PROGRESS NOTES
ABG completed. Pt pH 7.45, CO2 32, Bicarb 22. WDL for this patient. Glucose checked at 99 on one touch. Results paged to Dr. Tommie Cowden, will await further orders. Pt remains more difficult to arouse at this time.

## 2018-11-22 NOTE — CONSULTS
smoking about 35 years ago. He has a 44.00 pack-year smoking history. He has never used smokeless tobacco. He reports that he does not drink alcohol or use drugs. Family History:   Family History   Problem Relation Age of Onset    Heart Disease Mother     COPD Father     Cancer Brother         Unknown type       Allergies:  Patient has no known allergies. MEDICATIONS during current hospitalization:    Continuous Infusions:   norepinephrine         Scheduled Meds:   sodium chloride flush  10 mL Intravenous 2 times per day    enoxaparin  30 mg Subcutaneous Daily    piperacillin-tazobactam  3.375 g Intravenous Q8H    polyethylene glycol  17 g Oral Daily    lidocaine  2 patch Transdermal Daily    sodium chloride flush  10 mL Intravenous 2 times per day    vancomycin (VANCOCIN) intermittent dosing (placeholder)   Other RX Placeholder    vancomycin  1,500 mg Intravenous Q24H       PRN Meds:sodium chloride flush, docusate sodium, sennosides-docusate sodium, sodium chloride flush, norepinephrine, hydrALAZINE        REVIEW OF SYSTEMS:  Not obtainable from the patient    PHYSICAL EXAM:    Vitals:  BP (!) 125/90   Pulse 76   Temp 97.2 °F (36.2 °C) (Oral)   Resp 29   Ht 6' 1\" (1.854 m)   Wt 195 lb 1.7 oz (88.5 kg)   SpO2 92%   BMI 25.74 kg/m²     General: Laying in bed, appears uncomfortable however no respiratory distress, open his eyes and mumbles answers but does not communicate well  Head: normocephalic, atraumatic  Eyes:No gross abnormalities. and PERRL  ENT:  MMM no lesions  Neck:  supple and no masses  Chest : Diffuse rales, no wheezing, diminished air movement, nontender, tympanic  Heart[de-identified] Heart sounds are normal.  Regular rate and rhythm without murmur, gallop or rub.   ABD:  symmetric, soft, tender diffusely with no guarding or rebound, no hepato-or splenomegaly  Musculoskeletal : no cyanosis, no clubbing and 1 + edema-  bilateral lower extremities  Neuro:  Encephalopathic, can move all 4 extremities  Skin: No rashes or nodules noted. Lymph node:  no cervical nodes  Urology: No La   Psychiatric: calm         Data Review  Recent Labs      11/21/18   1225  11/22/18   0610   WBC  13.3*  13.9*   HGB  13.4*  11.6*   HCT  40.7*  34.1*   PLT  126*  87*      Recent Labs      11/21/18   1226  11/22/18   0600   NA  141  141   K  4.5  5.7*   CL  94*  102   CO2  19*  24   BUN  76*  94*   CREATININE  2.39*  3.26*   GLUCOSE  106  94       MV Settings: ABGs:   Recent Labs      11/22/18   0624   PHART  7.454*   YHP2JNH  32*   PO2ART  64*   CDH8TPA  22.3   BEART  -2   H0AOPZDN  93*   VSQ8NUA  23     O2 Device: Venturi mask  O2 Flow Rate (L/min): 15 L/min  Lab Results   Component Value Date    LACTA 5.3 11/21/2018    LACTA 1.4 10/26/2018    LACTA 1.0 10/25/2018       Radiology  I personally reviewed imaging studies and  Chest x-ray shows congestion no infiltrate. CT abdomen reviewed and base of the lung shows hazy groundglass changes likely volume overload        Assessment, plan:    This is a critically ill patient at risk of deterioration / death , needing close ICU monitoring and intervention due to below noted problems     · Sepsis, possibly related to osteomyelitis, pneumonia is possible but less likely, pro-calcitonin is significantly elevated  · Acute encephalopathy secondary to #1  · Acute kidney injury, secondary to #1  · Elevated troponin secondary to demand ischemia and trended down  · Heart failure ejection fraction 20%    Recommendations  · Continue current antibiotics  · MRI lumbar and lower thoracic spine  · Follow-up culture results  · O2 to keep sat 90-92% if worsening mental status and unable to protect his airways and consider intubation  · Watch volume status and avoid overload  · Consider Lasix if renal function improves  · ABGs as needed  · Avoid CNS acting medications  · Monitor blood sugar target 140-180  · Repeat BMP later today and if his kidney function improving consider

## 2018-11-22 NOTE — CONSULTS
views of the lumbar spine in AP and lateral coned-down view of the lumbosacral junction. FINDINGS: There are 5 nonrib-bearing lumbar-type vertebral bodies. Multilevel vertebral body endplate spurring is present, most significant at the L3-4 and L4-5 levels. Evaluation of T12 is obscured secondary to superimposed structures however there is concern for  T12 vertebral body compression. Remaining vertebral body heights are preserved. Multilevel facet arthropathy most significant at L4-5 and L5-S1. No spondylolysis or spondylolisthesis. IVC filter noted. Atherosclerotic calcification of the abdominal aorta. Moderate amount of stool is present within the rectosigmoid colon. Evaluation of T12 is obscured secondary to superimposed structures however there is concern for T12 vertebral body compression. Correlation for point tenderness is recommended. MRI or CT of the lumbar spine would be of benefit to further evaluate if clinically indicated. Ct Head Wo Contrast    Result Date: 11/21/2018  CT Brain Contrast medium:  Not utilized. History: Altered mental status Comparison:  CT brain, November 20, 2018, October 25, 2018, 23, 2018. Findings: Extra-axial spaces:  Normal. Intracranial hemorrhage:  None. Ventricular system: Ventricles mildly enlarged. Sulci mildly prominent. Basal Cisterns:  Normal. Cerebral Parenchyma: Geographic area of decreased attenuation, left frontal lobe, exerting no mass effect, again identified, and unchanged. Midline Shift:  None. Cerebellum: The area of decreased attenuation within the left cerebellum is again identified and unchanged. Focal area of increased attenuation within this finding is no longer identified. No mass effect is visualized. A geographic area of decreased attenuation is again found laterally within the right cerebellar hemisphere. Paranasal sinuses and mastoid air cells:  Normal. Visualized Orbits:  Normal.     Impression: Resolving left cerebellar infarct.  Remote left left subclavian AICD, postoperative changes from previous aortic valve replacement and CABG. There is no significant pleural effusion, pneumothorax, or other significant changes identified. APPARENTLY WORSENING AIRSPACE AND INTERSTITIAL INFILTRATES, WITH POSSIBILITIES AS DESCRIBED. Xr Chest Portable    Result Date: 10/28/2018  XR CHEST PORTABLE : 10/28/2018 CLINICAL HISTORY:  bilateral infiltrates . COMPARISON: Portable chest and chest CTA 10/25/2018. TECHNIQUE: A portable upright AP radiograph of the chest was obtained. FINDINGS: Since the prior studies, there is been interval placement of a nasogastric tube that extends below the diaphragm out of field of view of the radiograph, likely in good position. Moderate patchy predominantly central pulmonary opacities have mildly improved when compared to the prior study; especially of the right lung base. The heart remains mild to moderately enlarged with mild vascular congestion. Postoperative changes from previous aortic valve replacement and a left subclavian AICD are again noted. There is no significant pleural effusion, pneumothorax, or other changes identified. MILDLY IMPROVED BILATERAL PULMONARY INFILTRATES COMPARED TO 10/25/2018. NASOGASTRIC TUBE IN GOOD APPARENT POSITION. NO OTHER SIGNIFICANT CHANGES IDENTIFIED. Xr Chest Portable    Result Date: 10/25/2018  EXAMINATION: CHEST PORTABLE VIEW  CLINICAL HISTORY: Hypoxia COMPARISONS: October 22, 2018  FINDINGS: Single  views of the chest is submitted. There is a left-sided ICD device. Leads overlying the cardiac silhouette. Unchanged. There are multiple median sternotomy wires. The cardiac silhouette is enlarged unchanged configuration. .  The mediastinum is unremarkable. Pulmonary vascular remains congested with increased interstitial markings. . Right sided trachea. Bibasilar infiltrates consolidation with bilateral pleural effusions. .  No Pneumothoraces. was shown to aspirate and infuse properly. The flange of the catheter was affixed to the arm using a PICC securement device. A spot image of the chest showed the tip of the PICC line to lie in the cavoatrial junction. The patient tolerated the procedure well and without complications. SUCCESSFUL PICC PLACEMENT WITHOUT IMMEDIATE COMPLICATIONS. 643.6 seconds of fluoroscopy was used, with 1 fluoroscopic still saved. No diagnostic images were obtained. Fl Modified Barium Swallow W Video    Result Date: 10/24/2018  EXAMINATION: MODIFIED BARIUM SWALLOW: CLINICAL DATA: DYSPHAGIA. COMPARISON: November 14, 2014. TECHNIQUE: A total of 7 dynamic image series over the course of 2.2 minutes were obtained. In cooperation with Speech Pathology, a modified barium swallowing using various consistencies of both solids and liquids with dynamic imaging was performed. FINDINGS: Patient's oral stage was characterized by mild oral dysphagia. There is prolonged mastication. There is decreased AP transit. There is premature pharyngeal spillage to the level of the vallecula. Patient's pharyngeal stage was characterized by mild pharyngeal dysphagia. Swallowing reflex was triggered at the level of the vallecula. There is reduced tongue base retraction with minimal residuals in the vallecula. No penetration or aspiration noted. MILD ORAL AND PHARYNGEAL DYSPHAGIA. RECOMMENDATION BY SPEECH PATHOLOGY TO FOLLOW. Xr Hip 2-3 Vw W Pelvis Right    Result Date: 11/21/2018  EXAMINATION: XR HIP (4 views total) W PELVIS RIGHT CLINICAL HISTORY: FALL. PAIN. COMPARISONS: None available. FINDINGS: Diffuse osteopenia. Degenerative change lower lumbar spine. No fracture. No bone lesion     NO FRACTURE.  DEGENERATIVE CHANGE LUMBAR SPINE      Assessment:  80y.o. year old male with history s/f parkinson's disease, CAD, depression and HLD who was brought in from a facility where he was being treated for aspiration PNA for hypotension w/ SBP in the

## 2018-11-22 NOTE — CONSULTS
Impression       FRACTURES OF THE T12 AND L1, WHICH ARE SUSPICIOUS FOR UNDERLYING SPONDYLODISCITIS/VERTEBRAL OSTEOMYELITIS, WHICH HAVE DEVELOPED SINCE 10/21/2018.       MILD PATCHY ATELECTASIS OR EDEMA ON THE LUNG BASES. PNEUMONIA SHOULD BE EXCLUDED CLINICALLY.       MODERATE NONSPECIFIC DISTENTION OF THE GALLBLADDER WITH A SMALL CALCIFIED GALLSTONE NEAR THE NECK.       CONSTIPATION/FECAL IMPACTION WITHOUT COMPLICATION OR OTHER ACUTE CHANGE FROM THE PRIOR STUDY IDENTIFIED. EXAMINATION: XR CHEST PORTABLE       CLINICAL HISTORY: SHORTNESS OF BREATH, WHEEZING, INCOHERENT.       COMPARISONS: NOVEMBER 21, 2018, CT CHEST, OCTOBER 30, 2018       FINDINGS: Low lung volumes. Median sternotomy. Pacemaker generator and wires unchanged in position. Cardiopericardial silhouette enlarged and unchanged. Ill-defined areas of increased opacity found in the lower lung zones, with blunting of the    costophrenic angles.           Impression   RIGHT BASILAR ATELECTASIS/PNEUMONIA. BILATERAL PLEURAL EFFUSIONS. Procedure Component Value Units Date/Time   Culture Blood #1 [[id number]] Collected: 11/21/18 1224   Order Status: Completed Specimen: Blood from Blood Updated: 11/22/18 1415    Blood Culture, Routine No Growth to date.  Any change in status will be called. Narrative:     ORDER#: [id number]                          ORDERED BY: LUBNA MCCRAY  SOURCE: Blood Blood                        COLLECTED:  11/21/18 12:24  ANTIBIOTICS AT JORGE. :                      RECEIVED :  11/21/18 12:24   Culture Blood #2 [[id number]] Collected: 11/21/18 1335   Order Status: Completed Specimen: Blood Updated: 11/22/18 1415    Culture, Blood 2 No Growth to date.  Any change in status will be called. Narrative:     ORDER#: [id number]                          ORDERED BY: LUBNA MCCRAY  SOURCE: Blood                              COLLECTED:  11/21/18 13:35  ANTIBIOTICS AT JOR[facility]. :                      RECEIVED :  86/38/85 13:41     CBC auto hypertension    Age related cataract    Parkinson disease ([facility] [address]    Hemoptysis    Pneumonia    Thrombocytopenia (HCC)    Pneumonia of right lower lobe due to infectious organism ([address]    Chronic obstructive pulmonary disease (HCC)    SOB (shortness of breath)    Dysphagia    Food impaction of esophagus    Stroke-like symptom    Atelectasis, bilateral    Hypoxia    Septic shock ([facility] [address]         PLAN:      Sepsis  possiple vertebral osteomyelitis      Agree with vanco zosyn    Await  CT of T spine    May need to biopsy spine        Pau Tejada

## 2018-11-23 NOTE — PLAN OF CARE
Problem: Nutrition  Goal: Optimal nutrition therapy  Outcome: Ongoing  Nutrition Problem: Inadequate oral intake  Intervention: Food and/or Nutrient Delivery: Continue NPO (When pt hemodynamically stable, begin EN.  Renal TF @ 20 ml/hr to increase to  a goal rate of  40 ml/hr (960 ml, 1728 kcals, 78 g protein, ~700 ml free water) )  Nutritional Goals: initation of EN when medically appropriate

## 2018-11-23 NOTE — PROGRESS NOTES
Pharmacy Vancomycin Consult     Vancomycin Day: 3  Current Dosin mg IV q24h    Temp max:  99.3F    Recent Labs      18   0440   BUN  106*  111*       Recent Labs      18   0440   CREATININE  4.54*  4.75*       Recent Labs      18   0440   WBC  not done  25.1*         Intake/Output Summary (Last 24 hours) at 18 0809  Last data filed at 18 1708   Gross per 24 hour   Intake 150 ml   Output 60 ml   Net 90 ml       Culture Date      Source                       Results  Culture Blood #1 [[id number]] Collected: 18 1224   Order Status: Completed Specimen: Blood from Blood Updated: 18 1415    Blood Culture, Routine No Growth to date. Any change in status will be called. Narrative:     ORDER#: [id number]                          ORDERED BY: LUBNA MCCRAY  SOURCE: Blood Blood                        COLLECTED:  18 12:24  ANTIBIOTICS AT JOR[facility].:                      RECEIVED :  18 12:24   Culture Blood #2 [[id number]] Collected: 18 1335   Order Status: Completed Specimen: Blood Updated: 18 1415    Culture, Blood 2 No Growth to date. Any change in status will be called. Narrative:     ORDER#: [id number]                          ORDERED BY: LUBNA MCCRAY  SOURCE: Blood                              COLLECTED:  18 13:35  ANTIBIOTICS AT JORGE.:                      RECEIVED :  18 13:41       Ht Readings from Last 1 Encounters:   18 6' 1\" (1.854 m)        Wt Readings from Last 1 Encounters:   18 195 lb 1.7 oz (88.5 kg)         Body mass index is 25.74 kg/m². Estimated Creatinine Clearance: 13 mL/min (A) (based on SCr of 4.75 mg/dL (H)). Trough: Updated to be drawn before 3rd dose on  at 0830. Assessment/Plan:  With patient developing SHWETHA and updated weight difference recorded since ED admission, will decrease vancomycin to 1250 mg IV every 36 hours.  Cultures have come back negative for growth thus far. Next trough scheduled for tomorrow morning before next dose. Will continue to follow.     Tyrone Rodriguez, Yanira  11/23/2018 8:16 AM

## 2018-11-23 NOTE — PROGRESS NOTES
Speech-Language Pathology      NAME:  Analia Corado  ROOM: [facility]03/[facility]03-01  :  8/3/1932  DATE: 2018    This patient was being seen by Speech Therapy for:    []  Speech/Language Treatment  []  Dysphagia Treatment  []  Cognitive Treatment  [x]  Other: Bedside Swallowing Evaluation      However, due to this patient's change in medical status, Speech Therapy will require new orders to continue to follow the patient. Please re-order, as needed.      Thank you,  Federico De Jesus CCC-SLP, Date: 2018, Time: 7:50 AM

## 2018-11-23 NOTE — PROCEDURES
Intubation Procedure Note    Indication: impending respiratory failure and hypoxia    Consent: Unable to be obtained due to the emergent nature of this procedure. Medications Used: midazolam intravenously    Procedure: The patient was placed in the appropriate position. Cricoid pressure was not required. Intubation was performed by direct laryngoscopy using a laryngoscope and a 7.5 cuffed endotracheal tube. The cuff was then inflated and the tube was secured appropriately at a distance of 25 cm to the dental ridge. Initial confirmation of placement included bilateral breath sounds, an end tidal CO2 detector, tube fogging, adequate chest rise, adequate pulse oximetry reading and improved skin color. A chest x-ray to verify correct placement of the tube showed the tube needed advancing and the tube was repositioned accordingly. The patient tolerated the procedure well.      Complications: None    Electronically signed by Cele Menjivar MD on 11/22/2018 at 8:00 PM

## 2018-11-23 NOTE — CONSULTS
[address]                      [address], [address] [facility]                                  CONSULTATION    PATIENT NAME: Mukesh Bennett                    :        1932  MED REC NO:   [id number]                            ROOM:       IC03  ACCOUNT NO:   [de-identified]                           ADMIT DATE: 2018  PROVIDER:     Charlene Dawn MD    CONSULT DATE:  2018    NEUROSURGERY CONSULTATION    HISTORY OF PRESENT ILLNESS:  Neurosurgery consulted for severe back  pain, T12-L1 osteomyelitis pathologic fracture. He is 80year-old who  has been in a nursing home for some time. He has nonischemic  cardiomyopathy, he has had an aortic valve replacement in  with  reconstruction of the aortic root, he has had deep vein thrombosis,  pulmonary embolism in the past while on anticoagulation therapy with an  IVC filter. He has a cardiac implant for cardiac pacemaker. He has  significant cardiac disease. The patient's blood pressure began to  decrease because of multiple system failure. He is on blood pressure  medications for sustaining for his blood pressure. He is unresponsive. He has multiple comorbidities with compromised cardiac function. He has  class III biventricular systolic failure, permanent atrial fibrillation,  he is status post biventricular ICD. He has hypotension. He is being  treated well but he has been refractory to medications. He has sepsis  with antibiotics and he is not able to respond. He has been placed on  DNR CCA status. He is going to be weaned from his ventilator with the  concurrence of the family according to the patient's wishes. PHYSICAL EXAMINATION:  Shows he is intubated, totally unresponsive. He  has no reaction even to deep pain. He has no spontaneous eye opening. No spontaneous movements. This patient is not a candidate for consideration of the surgery.   He  has multiorgan system failure and

## 2018-11-23 NOTE — PROGRESS NOTES
study, and are most likely atelectasis and/or edema. Pneumonia should be excluded clinically. The heart remains moderately enlarged and there is a trace left pleural effusion. A subacute fracture of the posterolateral aspect of the ninth rib was not included on the prior study. The gallbladder is moderately distended with a small calculus near the neck. There is no biliary dilatation or significant CT evidence of pancreatitis. A large volume of dense stool is noted in the rectum, with moderate stool noted elsewhere. There is no significant colitis, inflammatory changes, abscess, pneumatosis, or small bowel dilatation The unenhanced liver, spleen, pancreas, adrenal glands, kidneys, great vessels, urinary bladder, and additional images of pelvis appear within normal limits, with benign-appearing renal cysts and inferior vena cava filter again noted. FRACTURES OF THE T12 AND L1, WHICH ARE SUSPICIOUS FOR UNDERLYING SPONDYLODISCITIS/VERTEBRAL OSTEOMYELITIS, WHICH HAVE DEVELOPED SINCE 10/21/2018. MILD PATCHY ATELECTASIS OR EDEMA ON THE LUNG BASES. PNEUMONIA SHOULD BE EXCLUDED CLINICALLY. MODERATE NONSPECIFIC DISTENTION OF THE GALLBLADDER WITH A SMALL CALCIFIED GALLSTONE NEAR THE NECK. CONSTIPATION/FECAL IMPACTION WITHOUT COMPLICATION OR OTHER ACUTE CHANGE FROM THE PRIOR STUDY IDENTIFIED. Xr Lumbar Spine (min 4 Views)    Result Date: 11/21/2018  EXAMINATION: X-RAY: LUMBAR SPINE, 6 VIEWS CLINICAL HISTORY: Fall. Low back pain and right hip pain COMPARISONS: CT abdomen pelvis from October 21, 2018 TECHNIQUE: AP, lateral, bilateral oblique views of the lumbar spine in AP and lateral coned-down view of the lumbosacral junction. FINDINGS: There are 5 nonrib-bearing lumbar-type vertebral bodies. Multilevel vertebral body endplate spurring is present, most significant at the L3-4 and L4-5 levels. Evaluation of T12 is obscured secondary to superimposed structures however there is concern for  T12 vertebral body compression. Normal. Intracranial hemorrhage:  None. Ventricular system: Ventricles mildly to moderately enlarged. Sulci mildly to moderately prominent. Basal Cisterns:  Normal. Cerebral Parenchyma:  Bilateral, symmetric periventricular areas of decreased attenuation. . Midline Shift:  None. Cerebellum:  Normal. Paranasal sinuses and mastoid air cells:  Normal. Visualized Orbits:  Normal.     Impression: Mild cerebral atrophy. Chronic ischemic white matter disease. All CT scans at this facility use dose modulation, iterative reconstruction, and/or weight based dosing when appropriate to reduce radiation dose to as low as reasonably achievable. Ct Head Wo Contrast    Result Date: 10/25/2018  CT Brain Contrast medium:  Not utilized. History:  Abnormal gait, ataxia, cerebral ischemia Comparison:  October 23, 2018, October 22, 2018. Findings: Extra-axial spaces:  Normal. Intracranial hemorrhage:  None. Ventricular system:  Normal for age. Basal Cisterns:  Normal. Cerebral Parenchyma:  Normal. Midline Shift:  None. Cerebellum: Again identified is an area of decreased ill-defined attenuation located within the left cerebellum, having a rounded focus of increased attenuation along its inferior, and lateral margin. This finding is unchanged from the prior study. Focal  area of decreased attenuation exerting no mass effect in the right cerebellum laterally is again identified and unchanged. Paranasal sinuses and mastoid air cells:  Normal. Visualized Orbits:  Normal.     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 All CT scans at this facility use dose modulation, iterative reconstruction, and/or weight based dosing when appropriate to reduce radiation dose to as low as reasonably achievable. Ct Chest Wo Contrast    Result Date: 10/30/2018  CT of the Chest without intravenous contrast medium.  History:  Amiodarone toxicity follow-up Technical Factors: CT imaging of the chest costophrenic angles suggesting small pleural effusions. There is a left-sided dual lead ICD. Endotracheal tube tip is 3.5 cm above the rozina. There is no pneumothorax evident. Sternotomy wires and prosthetic aortic valve are present. There are calcifications from old granulomatous disease, including calcified lymph node in the aorticopulmonary window. RIGHT LOWER LUNG GROUNDGLASS INFILTRATE, INCREASED FROM YESTERDAY'S CHEST RADIOGRAPH; OTHERWISE, NO SIGNIFICANT CARDIOPULMONARY CHANGE, STATUS POST INTUBATION. Xr Chest Portable    Result Date: 11/22/2018  EXAMINATION: XR CHEST PORTABLE CLINICAL HISTORY: SHORTNESS OF BREATH, WHEEZING, INCOHERENT. COMPARISONS: NOVEMBER 21, 2018, CT CHEST, OCTOBER 30, 2018 FINDINGS: Low lung volumes. Median sternotomy. Pacemaker generator and wires unchanged in position. Cardiopericardial silhouette enlarged and unchanged. Ill-defined areas of increased opacity found in the lower lung zones, with blunting of the costophrenic angles. RIGHT BASILAR ATELECTASIS/PNEUMONIA. BILATERAL PLEURAL EFFUSIONS. Xr Chest Portable    Result Date: 11/21/2018  EXAMINATION: XR CHEST PORTABLE CLINICAL HISTORY: 80year-old with hypoxia and hypotension COMPARISONS: Portable chest x-ray 10/30/2018 FINDINGS: Portable frontal views the chest were obtained at 12:20 hours. There are low lung volumes as well as portable technique compromises plain film assessment of the chest. Patient is status post median sternotomy. There is a left-sided ICD with 3 intracardiac leads not significantly changed in position. Aortic valvular prosthesis is again demonstrated overlying the cardiac silhouette. Cardiac silhouette remains mildly enlarged. Superior mediastinum is not significantly widened. Left hilar calcifications suggest remote granulomatous disease. Coarsened interstitial markings are demonstrated.  However there is been some improvement in patchy alveolar and interstitial infiltrates when compared to the proper hand cleansing. A pre-procedure time out was performed in order to assure the correct patient and procedure. Local anesthetic was administered. A peripheral vein was accessed with sonographic guidance. A sonographic spot image was obtained for documentation. A guidewire was advanced into the vein with fluoroscopic guidance and a sheath was placed over the guidewire. A 5-Mauritian 47 cm triple-lumen PICC was advanced through the sheath, up the arm and into the central vasculature. It was positioned appropriately. The sheath was removed. The catheter was shown to aspirate and infuse properly. The flange of the catheter was affixed to the arm using a PICC securement device. A spot image of the chest showed the tip of the PICC line to lie in the cavoatrial junction. The patient tolerated the procedure well and without complications. SUCCESSFUL PICC PLACEMENT WITHOUT IMMEDIATE COMPLICATIONS. 143.8 seconds of fluoroscopy was used, with 1 fluoroscopic still saved. No diagnostic images were obtained. Ir Ultrasound Guidance Vascular Access    Result Date: 11/21/2018  IR FLUORO GUIDED CVA DEVICE PLACEMENT, IR PICC WO SQ PORT/PUMP > 5 YEARS, IR ULTRASOUND GUIDANCE VASCULAR ACCESS: 11/21/2018 CLINICAL HISTORY: IV access for sepsis  . PROCEDURE: After discussing the procedure and possible complications with the patient, informed consent was obtained. The patient was placed on the Special Procedures table. Central venous catheter was inserted using a maximal sterile barrier technique which includes cap, mask, sterile gown, sterile gloves, sterile full-body drape, hand hygiene and 2% chlorhexidine for cutaneous antisepsis. A sterile ultrasound technique with sterile gel and sterile probe covers was also utilized. The left upper extremity was sterilely prepared using 2% chlorhexidine and then draped with sterile towels and a body-sized sterile drape.   All personnel in the Special Procedures Room donned

## 2018-11-23 NOTE — PROGRESS NOTES
extended infusion (mini-bag)  3.375 g Intravenous Q12H THEODORE Carl 12.5 mL/hr at 11/23/18 1013 3.375 g at 11/23/18 1013    [START ON 11/24/2018] vancomycin (VANCOCIN) 1,250 mg in dextrose 5 % 250 mL IVPB  1,250 mg Intravenous Q36H THEODORE Carl        vasopressin 20 Units in dextrose 5 % 100 mL infusion  0.03 Units/min Intravenous Continuous Marie Roberts DO        glucose (GLUTOSE) 40 % oral gel 15 g  15 g Oral PRN Kendal Oreilly MD        dextrose 50 % solution 12.5 g  12.5 g Intravenous PRN Kendal Oreilly MD        glucagon (rDNA) injection 1 mg  1 mg Intramuscular PRN Kendal Oreilly MD        dextrose 5 % solution  100 mL/hr Intravenous PRN Kendal Oreilly MD        insulin lispro (HUMALOG) injection vial 0-6 Units  0-6 Units Subcutaneous Q4H Radha Cantu MD   1 Units at 11/23/18 0657    propofol 1000 MG/100ML injection  10 mcg/kg/min Intravenous Titrated Fabi Mi MD 5.3 mL/hr at 11/23/18 1012 10 mcg/kg/min at 11/23/18 1012    norepinephrine (LEVOPHED) 16 mg in dextrose 5 % 250 mL infusion  2 mcg/min Intravenous Continuous Fabi Mi MD 22.5 mL/hr at 11/23/18 0711 24 mcg/min at 11/23/18 0711    sodium chloride flush 0.9 % injection 10 mL  10 mL Intravenous 2 times per day THEODORE Carl   Stopped at 11/23/18 0900    sodium chloride flush 0.9 % injection 10 mL  10 mL Intravenous PRN THEODORE Bland        enoxaparin (LOVENOX) injection 30 mg  30 mg Subcutaneous Daily THEODORE Bland   30 mg at 11/23/18 1013    docusate sodium (COLACE) capsule 100 mg  100 mg Oral BID PRN Eloisa Chery DO        sennosides-docusate sodium (SENOKOT-S) 8.6-50 MG tablet 1 tablet  1 tablet Oral Nightly PRN Eloisa Chery DO        polyethylene glycol (GLYCOLAX) packet 17 g  17 g Oral Daily Eloisa Chery DO   Stopped at 11/23/18 0900    lidocaine 4 % external patch 2 patch  2 patch Transdermal Daily Eloisa Chery DO   Stopped at 11/21/18 2130    sodium chloride flush 0.9 % injection 10 mL  10 mL Intravenous 2 times per day Rajni Silva DO   10 mL at 11/22/18 2236    sodium chloride flush 0.9 % injection 10 mL  10 mL Intravenous PRN Rajni Silva DO        vancomycin Zachary Chinchilla intermittent dosing (placeholder)   Other RX Placeholder Nevin Patch [place]        norepinephrine (LEVOPHED) 16 mg in dextrose 5 % 250 mL infusion  5 mcg/min Intravenous PRN Rajni Silva DO        hydrALAZINE (APRESOLINE) injection 10 mg  10 mg Intravenous Q4H PRN Clif High MD   10 mg at 11/22/18 1642     Assessment and Plan:          Acute Hospital issues:    # septic shock due to possible pneumonia and probable osteomyelitis  - vancomycin and Zosyn  - on epi and  levophed gtt, add vasopressin   - off IV fluid, avoid volume overload due to systolic heart failure with EF of 20%, monitor closely in the ICU  - critical care consult  - infectious disease consult, follow-up blood culture. procal high  - NSGY consult, may need biopsy when more stable    # vertebral fracture due to falls versus osteomyelitis- spine surgery consult, pain management, ordered lidocaine patches    # SHWETHA - worsening, due to septic shock  - renal on board. - ?RRT- CVVH may be needed     # chronic systolic heart failure- resume home medication, hold antihypertensives, careful hydration for septic shock. Has an ICD     # recent hospitalization for pneumonia/A. fib with RVR/acute on chronic heart failure    # A. Fib- not on anticoagulation, resume rate control    # poor venous access- getting a PICC line    # elevated troponins- N STEMI type II, monitor troponin, telemetry, cardiology consult. Likely due to above. No chest pain.     # constipation  - laxatives ordered     # hyperkalemia due to worsening renal function   - kayexalate   - monitor          CCT 43 min  DISCHARGE PLANNING  ICU        Electronically signed by Rajni Silva DO on 11/23/2018 at 11:22 AM

## 2018-11-23 NOTE — CODE DOCUMENTATION
EF 20% via Dr. Kostas Candelario. A line going in the L wrist per Dr. Kostas Candelario. 100/hr NS infusing.

## 2018-11-24 NOTE — FLOWSHEET NOTE
2000 Pt resting quietly unresponsive   O2 liters NC  Family at bedside  2045 Incont of stool derrick care given  Repositioned  2200 Hospice here doing referral   2330 spoke with [employer] about  AICD states to apply magnet over AICD  And spoke with Dr. Chava Henriquez about magnet over AICD order received  [phone] Magnet  taped placed over AICD per order  [phone] Without respirations, Blood pressure and asystole Dr. Larsen Postin aware pts sister Doll Clubs and niece at bedside  [facility] here to pick pt up

## 2018-11-26 LAB
BLOOD CULTURE, ROUTINE: NORMAL
CULTURE, BLOOD 2: NORMAL

## 2018-12-01 NOTE — DISCHARGE SUMMARY
again found laterally within the right cerebellar hemisphere. Paranasal sinuses and mastoid air cells:  Normal. Visualized Orbits:  Normal.     Impression: Resolving left cerebellar infarct. Remote left frontal and right cerebellar infarcts. All CT scans at this facility use dose modulation, iterative reconstruction, and/or weight based dosing when appropriate to reduce radiation dose to as low as reasonably achievable. Ct Thoracic Spine Wo Contrast    Result Date: 11/22/2018  CT thoracic spine without intravenous contrast medium. HISTORY: T12 and L1 fracture. Possible osteomyelitis. Technical factors: Imaging of the thoracic spine was obtained and formatted as 2.5 mm contiguous axial images. Sagittal and coronal reconstructions were obtained during postprocessing. No intravenous contrast medium utilized. Study done in comparison with CT abdomen pelvis, November 21, 2018. FINDINGS: Thoracic kyphosis with apex at T6. Diffuse disc space narrowing thoracic spine. Diffuse osteopenia. Again identified is fragmentation of the caudal portion of T12, and superior endplate of L1, with multiple punctate foci of air within the area of fragmentation. These findings are essentially unchanged from those visualized on CT abdomen pelvis, November 21, 2018. Simple cyst in its lower pole right kidney again identified. Inferior vena cava filter. Pacemaker wires. Patchy airspace disease throughout image right and left lungs. No interval change, fractures T12 and L1. Cannot exclude discitis/osteomyelitis. All CT scans at this facility use dose modulation, iterative reconstruction, and/or weight based dosing when appropriate to reduce radiation dose to as low as reasonably achievable. Ct Lumbar Spine Wo Contrast    Result Date: 11/22/2018  CT lumbar spine without intravenous contrast medium. HISTORY: T12 and L1 fracture. Question osteomyelitis.  Technical factors: CT imaging lumbar spine obtained and formatted as 2.5 mm interstitial markings are demonstrated. However there is been some improvement in patchy alveolar and interstitial infiltrates when compared to the previous study. No large pleural effusions or pneumothoraces. INTERVAL IMPROVEMENT WITH DECREASING PATCHY ALVEOLAR AND INTERSTITIAL INFILTRATES  COMPARED TO THE PREVIOUS STUDY. Ir Picc Wo Sq Port/pump > 5 Years    Result Date: 11/21/2018  IR FLUORO GUIDED CVA DEVICE PLACEMENT, IR PICC WO SQ PORT/PUMP > 5 YEARS, IR ULTRASOUND GUIDANCE VASCULAR ACCESS: 11/21/2018 CLINICAL HISTORY: IV access for sepsis  . PROCEDURE: After discussing the procedure and possible complications with the patient, informed consent was obtained. The patient was placed on the Special Procedures table. Central venous catheter was inserted using a maximal sterile barrier technique which includes cap, mask, sterile gown, sterile gloves, sterile full-body drape, hand hygiene and 2% chlorhexidine for cutaneous antisepsis. A sterile ultrasound technique with sterile gel and sterile probe covers was also utilized. The left upper extremity was sterilely prepared using 2% chlorhexidine and then draped with sterile towels and a body-sized sterile drape. All personnel in the Special Procedures Room donned caps and surgical masks. In addition, the  and first assistant donned sterile gowns and gloves after proper hand cleansing. A pre-procedure time out was performed in order to assure the correct patient and procedure. Local anesthetic was administered. A peripheral vein was accessed with sonographic guidance. A sonographic spot image was obtained for documentation. A guidewire was advanced into the vein with fluoroscopic guidance and a sheath was placed over the guidewire. A 5-Faroese 47 cm triple-lumen PICC was advanced through the sheath, up the arm and into the central vasculature. It was positioned appropriately. The sheath was removed.   The catheter was shown to and into the central vasculature. It was positioned appropriately. The sheath was removed. The catheter was shown to aspirate and infuse properly. The flange of the catheter was affixed to the arm using a PICC securement device. A spot image of the chest showed the tip of the PICC line to lie in the cavoatrial junction. The patient tolerated the procedure well and without complications. SUCCESSFUL PICC PLACEMENT WITHOUT IMMEDIATE COMPLICATIONS. 741.1 seconds of fluoroscopy was used, with 1 fluoroscopic still saved. No diagnostic images were obtained.        Discharge Medications:       Barbara Dixon   Home Medication Instructions [facility]:[id number]    Printed on:12/01/18 4927   Medication Information                      albuterol sulfate HFA (PROAIR HFA) 108 (90 Base) MCG/ACT inhaler  Inhale 2 puffs into the lungs every 6 hours as needed for Wheezing             albuterol sulfate HFA (VENTOLIN HFA) 108 (90 Base) MCG/ACT inhaler  Inhale 2 puffs into the lungs every 6 hours as needed for Wheezing             aspirin (ECOTRIN LOW STRENGTH) 81 MG EC tablet  Take 81 tablets by mouth daily             atorvastatin (LIPITOR) 40 MG tablet  Take 1 tablet by mouth nightly             carbidopa-levodopa (SINEMET)  MG per tablet  Take 1 tablet by mouth 2 times daily             carvedilol (COREG) 3.125 MG tablet  Take 3.125 mg by mouth 2 times daily (with meals)             DIGOXIN PO  Take 0.125 mcg by mouth Twice a Week every other day             diphenhydrAMINE (BENADRYL) 25 MG tablet  Take 25 mg by mouth every evening             famotidine (PEPCID) 20 MG tablet  Take 1 tablet by mouth 2 times daily             FLUoxetine (PROZAC) 40 MG capsule  Take 20 mg by mouth daily              furosemide (LASIX) 40 MG tablet  Take 40 mg by mouth daily             ipratropium (ATROVENT) 0.03 % nasal spray  2 sprays by Nasal route 2 times daily             ipratropium-albuterol (DUONEB) 0.5-2.5 (3) MG/3ML SOLN

## 2023-07-31 NOTE — CONSULTS
Consult dictated. Chronic biventricular heart failure ,LVef 20%  Baseline BP 95 to 100mm Hg. Permanent atrial fibrillation  Unable to anticoagulate due to hemoptysis  BIVICD in place  Prolonged QTc  Multisystem failure (respiratory,renal and cardiac) in 80 yr old ?sepsis underlying cause? Having difficulty maintaining BP   Unable to resume CHF medications.   Prognosis poor  Pt is DNR [facility]    Cardiology has nothing to add   [facility] patient now sustaining HR in the 20-30s. having more asystolic episodes

## (undated) DEVICE — TUBE SET 96 MM 64 MM H2O PERISTALTIC STD AUX CHANNEL

## (undated) DEVICE — BW-412T DISP COMBO CLEANING BRUSH: Brand: SINGLE USE COMBINATION CLEANING BRUSH

## (undated) DEVICE — ENDO CARRY-ON PROCEDURE KIT: Brand: ENDO CARRY-ON PROCEDURE KIT

## (undated) DEVICE — CONMED SCOPE SAVER BITE BLOCK, 20X27 MM: Brand: SCOPE SAVER

## (undated) DEVICE — BRUSH CLEANING ENDOSCOPE CHAN DISP

## (undated) DEVICE — SONY PRINTER PAPER

## (undated) DEVICE — TUBING, SUCTION, 1/4" X 10', STRAIGHT: Brand: MEDLINE

## (undated) DEVICE — FORCEPS BX L240CM JAW DIA2.8MM L CAP W/ NDL MIC MESH TOOTH

## (undated) DEVICE — Device: Brand: ENDO SMARTCAP

## (undated) DEVICE — ADAPTER FLSH PMP FLD MGMT GI IRRIG OFP 2 DISPOSABLE

## (undated) DEVICE — NAKAO SPIDER-NET RETRIEVAL DEVICE 230 X 2.5 X 5.5 CM: Brand: NAKAO

## (undated) DEVICE — GLOVE SURG SZ 85 STD WHT LTX SYN POLYMER BEAD REINF ANTI RL